# Patient Record
Sex: MALE | Race: WHITE | NOT HISPANIC OR LATINO | Employment: UNEMPLOYED | URBAN - NONMETROPOLITAN AREA
[De-identification: names, ages, dates, MRNs, and addresses within clinical notes are randomized per-mention and may not be internally consistent; named-entity substitution may affect disease eponyms.]

---

## 2021-06-21 ENCOUNTER — OFFICE VISIT (OUTPATIENT)
Dept: MEDICAL GROUP | Facility: PHYSICIAN GROUP | Age: 36
End: 2021-06-21
Payer: COMMERCIAL

## 2021-06-21 VITALS
SYSTOLIC BLOOD PRESSURE: 128 MMHG | WEIGHT: 163 LBS | TEMPERATURE: 98.4 F | RESPIRATION RATE: 14 BRPM | DIASTOLIC BLOOD PRESSURE: 74 MMHG | BODY MASS INDEX: 21.6 KG/M2 | HEART RATE: 70 BPM | HEIGHT: 73 IN | OXYGEN SATURATION: 99 %

## 2021-06-21 DIAGNOSIS — Z13.220 SCREENING, LIPID: ICD-10-CM

## 2021-06-21 DIAGNOSIS — M54.50 LUMBAR BACK PAIN: ICD-10-CM

## 2021-06-21 DIAGNOSIS — K59.1 FUNCTIONAL DIARRHEA: ICD-10-CM

## 2021-06-21 DIAGNOSIS — Z80.8 FAMILY HISTORY OF MELANOMA: ICD-10-CM

## 2021-06-21 DIAGNOSIS — D22.9 NUMEROUS SKIN MOLES: ICD-10-CM

## 2021-06-21 DIAGNOSIS — Z86.19 HISTORY OF HEPATITIS C: ICD-10-CM

## 2021-06-21 DIAGNOSIS — F41.9 ANXIETY: ICD-10-CM

## 2021-06-21 PROBLEM — K59.09 OTHER CONSTIPATION: Status: ACTIVE | Noted: 2021-06-21

## 2021-06-21 PROCEDURE — 99204 OFFICE O/P NEW MOD 45 MIN: CPT | Performed by: INTERNAL MEDICINE

## 2021-06-21 ASSESSMENT — PATIENT HEALTH QUESTIONNAIRE - PHQ9
CLINICAL INTERPRETATION OF PHQ2 SCORE: 2
5. POOR APPETITE OR OVEREATING: 3 - NEARLY EVERY DAY
SUM OF ALL RESPONSES TO PHQ QUESTIONS 1-9: 11

## 2021-06-21 NOTE — PROGRESS NOTES
Chief Complaint   Patient presents with   • Establish Care     Hep C.        HISTORY OF PRESENT ILLNESS: Patient is a 35 y.o. male new patient who presents today to discuss the medical issues below.    Functional diarrhea  Patient reports more problems with stool, skinnier in size generally painful with stooling, difficulty maintaining weight.  States present most of his life.  Anorexia most of the time, rarely good appetite.  He does use cannibus for appetite and chronic pain.      Anxiety  History of anxiety and OCD, has been on meds in the past fluoxitine, currently using cannabis.  Weight is stable but he generally needs to work on it.  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     Numerous skin moles  Mom with melanoma, wants check, no change to the moles he has.      Lumbar back pain  Patient reports chronic low back pain, he reports he has had imaging years ago for back pain in random ER, had scan positive for herniated discs and was told to put up with the pain as long as he could, lives in chronic pain.  History of fall with fractures, he believes has fractures.  Radiates to the right radiates into the legs bilaterally and generally weak.       Patient Active Problem List    Diagnosis Date Noted   • Family history of melanoma 06/21/2021   • History of hepatitis C 06/21/2021   • Functional diarrhea 06/21/2021   • Anxiety 06/21/2021   • Numerous skin moles 06/21/2021   • Lumbar back pain 06/21/2021       Allergies:Patient has no known allergies.    No current outpatient medications on file.     No current facility-administered medications for this visit.         Past Medical History:   Diagnosis Date   • Back pain        Social History     Tobacco Use   • Smoking status: Never Smoker   • Smokeless tobacco: Never Used   Substance Use Topics   • Alcohol use: Yes     Comment: occasional   • Drug use: Yes     Comment:  "Marijuana, occasional       No family status information on file.   No family history on file.    ROS:    Respiratory: Negative for cough, sputum production, shortness of breath or wheezing.    Cardiovascular: Negative for chest pain, palpitations, orthopnea, dyspnea with exertion or edema.   Gastrointestinal: Negative for GI upset, nausea, vomiting, abdominal pain, constipation or diarrhea.   Genitourinary: Negative for dysuria, urgency, hesitancy or frequency.       Exam:    /74   Pulse 70   Temp 36.9 °C (98.4 °F) (Temporal)   Resp 14   Ht 1.854 m (6' 1\")   Wt 73.9 kg (163 lb)   SpO2 99%  Body mass index is 21.51 kg/m².  General:  Well nourished, well developed male in NAD.  HENT: Normocephalic, bilateral TMs are intact, nasal and oral mucosa with no lesions,   Spine: diffuse lumbar discomfort to palpation.   Pulmonary: Clear to ausculation and percussion.  Normal effort. No rales, rhonchi, or wheezing.  Cardiovascular: Regular rate and rhythm without murmur.   Abdomen: Normal bowel sounds soft and nontender no palpable liver spleen bladder mass.  Extremities: No LE edema noted.  Neuro: Grossly nonfocal.  Psych: Alert and oriented to person, place, and time. Appropriate mood and conversation.        This dictation was created using voice recognition software. I have made reasonable attempts to correct errors, however, errors of grammar and content may exist.          Assessment/Plan:    1. Family history of melanoma  Referral for dermatology for surveillance.  - REFERRAL TO DERMATOLOGY    2. History of hepatitis C  Proceed to RNA PCR hep C antibody liver function assessment discussed referral to GI as needed positive findings  - HCV RNA PCR-GENOTYPE REFLEX; Future  - HEP C VIRUS ANTIBODY; Future    3. Functional diarrhea  Most consistent with functional check labs.  Monitor with proceed to #4 below.  - Comp Metabolic Panel; Future  - CBC WITH DIFFERENTIAL; Future    4. Anxiety  Components of OCD.  " Discussed our options.  Patient would like to be seen by psychiatry for full evaluation prior to medications referral is placed.  He is currently not in crisis.  Discussed behavior modification assist  - REFERRAL TO PSYCHIATRY    5. Numerous skin moles  To my simple exam no overt malignancies referral to dermatology    6. Lumbar back pain  Chronic periods consistent with degenerative lumbar spine disease has had work-up in the past but this is unlikely to be able to be obtainable and it has been quite a while.  We will go ahead and start with x-ray and early follow-up.  - DX-LUMBAR SPINE-2 OR 3 VIEWS; Future    7. Screening, lipid  Screening lipid for completeness  - Lipid Profile; Future       Patient was seen for 45 minutes face to face of which more than 50% of the time was spent in counseling and coordination of care regarding the above problems.

## 2021-07-21 ENCOUNTER — APPOINTMENT (RX ONLY)
Dept: URBAN - NONMETROPOLITAN AREA CLINIC 15 | Facility: CLINIC | Age: 36
Setting detail: DERMATOLOGY
End: 2021-07-21

## 2021-07-21 DIAGNOSIS — D18.0 HEMANGIOMA: ICD-10-CM

## 2021-07-21 DIAGNOSIS — D22 MELANOCYTIC NEVI: ICD-10-CM

## 2021-07-21 DIAGNOSIS — Z71.89 OTHER SPECIFIED COUNSELING: ICD-10-CM

## 2021-07-21 DIAGNOSIS — L81.4 OTHER MELANIN HYPERPIGMENTATION: ICD-10-CM

## 2021-07-21 PROBLEM — D22.62 MELANOCYTIC NEVI OF LEFT UPPER LIMB, INCLUDING SHOULDER: Status: ACTIVE | Noted: 2021-07-21

## 2021-07-21 PROBLEM — D22.39 MELANOCYTIC NEVI OF OTHER PARTS OF FACE: Status: ACTIVE | Noted: 2021-07-21

## 2021-07-21 PROBLEM — D48.5 NEOPLASM OF UNCERTAIN BEHAVIOR OF SKIN: Status: ACTIVE | Noted: 2021-07-21

## 2021-07-21 PROBLEM — D22.4 MELANOCYTIC NEVI OF SCALP AND NECK: Status: ACTIVE | Noted: 2021-07-21

## 2021-07-21 PROBLEM — D22.72 MELANOCYTIC NEVI OF LEFT LOWER LIMB, INCLUDING HIP: Status: ACTIVE | Noted: 2021-07-21

## 2021-07-21 PROBLEM — D18.01 HEMANGIOMA OF SKIN AND SUBCUTANEOUS TISSUE: Status: ACTIVE | Noted: 2021-07-21

## 2021-07-21 PROBLEM — D22.71 MELANOCYTIC NEVI OF RIGHT LOWER LIMB, INCLUDING HIP: Status: ACTIVE | Noted: 2021-07-21

## 2021-07-21 PROBLEM — D22.5 MELANOCYTIC NEVI OF TRUNK: Status: ACTIVE | Noted: 2021-07-21

## 2021-07-21 PROBLEM — D22.61 MELANOCYTIC NEVI OF RIGHT UPPER LIMB, INCLUDING SHOULDER: Status: ACTIVE | Noted: 2021-07-21

## 2021-07-21 PROCEDURE — 69100 BIOPSY OF EXTERNAL EAR: CPT

## 2021-07-21 PROCEDURE — ? COUNSELING

## 2021-07-21 PROCEDURE — 99203 OFFICE O/P NEW LOW 30 MIN: CPT | Mod: 25

## 2021-07-21 PROCEDURE — ? BIOPSY BY SHAVE METHOD

## 2021-07-21 PROCEDURE — 11103 TANGNTL BX SKIN EA SEP/ADDL: CPT | Mod: 59

## 2021-07-21 PROCEDURE — 11102 TANGNTL BX SKIN SINGLE LES: CPT | Mod: 59

## 2021-07-21 ASSESSMENT — LOCATION SIMPLE DESCRIPTION DERM
LOCATION SIMPLE: SCALP
LOCATION SIMPLE: RIGHT EAR
LOCATION SIMPLE: RIGHT POSTERIOR THIGH
LOCATION SIMPLE: LEFT THIGH
LOCATION SIMPLE: LEFT UPPER ARM
LOCATION SIMPLE: LEFT SHOULDER
LOCATION SIMPLE: CHEST
LOCATION SIMPLE: RIGHT UPPER ARM
LOCATION SIMPLE: LEFT FOREARM
LOCATION SIMPLE: POSTERIOR SCALP
LOCATION SIMPLE: LEFT POSTERIOR THIGH
LOCATION SIMPLE: RIGHT THIGH
LOCATION SIMPLE: RIGHT CALF
LOCATION SIMPLE: LEFT TEMPLE
LOCATION SIMPLE: LEFT EYEBROW
LOCATION SIMPLE: RIGHT SHOULDER
LOCATION SIMPLE: LEFT CHEEK
LOCATION SIMPLE: RIGHT FOREARM
LOCATION SIMPLE: RIGHT CHEEK
LOCATION SIMPLE: LEFT CALF

## 2021-07-21 ASSESSMENT — LOCATION ZONE DERM
LOCATION ZONE: LEG
LOCATION ZONE: FACE
LOCATION ZONE: EAR
LOCATION ZONE: ARM
LOCATION ZONE: TRUNK
LOCATION ZONE: SCALP

## 2021-07-21 ASSESSMENT — LOCATION DETAILED DESCRIPTION DERM
LOCATION DETAILED: LEFT MEDIAL SUPERIOR CHEST
LOCATION DETAILED: LEFT PROXIMAL DORSAL FOREARM
LOCATION DETAILED: RIGHT MEDIAL INFERIOR CHEST
LOCATION DETAILED: LEFT ANTERIOR DISTAL THIGH
LOCATION DETAILED: RIGHT DISTAL POSTERIOR UPPER ARM
LOCATION DETAILED: LEFT LATERAL EYEBROW
LOCATION DETAILED: RIGHT CENTRAL PARIETAL SCALP
LOCATION DETAILED: LEFT PROXIMAL POSTERIOR UPPER ARM
LOCATION DETAILED: RIGHT VENTRAL PROXIMAL FOREARM
LOCATION DETAILED: LEFT LATERAL TEMPLE
LOCATION DETAILED: RIGHT PROXIMAL DORSAL FOREARM
LOCATION DETAILED: RIGHT ANTERIOR DISTAL THIGH
LOCATION DETAILED: RIGHT PROXIMAL CALF
LOCATION DETAILED: RIGHT INFERIOR POSTAURICULAR SKIN
LOCATION DETAILED: RIGHT POSTERIOR EAR
LOCATION DETAILED: LEFT VENTRAL PROXIMAL FOREARM
LOCATION DETAILED: MID-OCCIPITAL SCALP
LOCATION DETAILED: RIGHT INFERIOR OCCIPITAL SCALP
LOCATION DETAILED: RIGHT LATERAL SUPERIOR CHEST
LOCATION DETAILED: LEFT POSTERIOR SHOULDER
LOCATION DETAILED: RIGHT DISTAL POSTERIOR THIGH
LOCATION DETAILED: LEFT PROXIMAL CALF
LOCATION DETAILED: RIGHT CENTRAL MALAR CHEEK
LOCATION DETAILED: RIGHT POSTERIOR SHOULDER
LOCATION DETAILED: LEFT DISTAL POSTERIOR THIGH
LOCATION DETAILED: LEFT LATERAL MALAR CHEEK

## 2021-07-21 NOTE — PROCEDURE: BIOPSY BY SHAVE METHOD
Detail Level: Detailed
Depth Of Biopsy: dermis
Was A Bandage Applied: Yes
Size Of Lesion In Cm: 0.8
X Size Of Lesion In Cm: 0
Biopsy Type: H and E
Biopsy Method: Personna blade
Anesthesia Type: 1% lidocaine with epinephrine
Anesthesia Volume In Cc: 1
Hemostasis: Electrocautery
Wound Care: Vaseline
Dressing: Band-Aid
Destruction After The Procedure: No
Type Of Destruction Used: Electrodesiccation
Curettage Text: The wound bed was treated with curettage after the biopsy was performed.
Cryotherapy Text: The wound bed was treated with cryotherapy after the biopsy was performed.
Electrodesiccation Text: The wound bed was treated with electrodesiccation after the biopsy was performed.
Electrodesiccation And Curettage Text: The wound bed was treated with electrodesiccation and curettage after the biopsy was performed.
Silver Nitrate Text: The wound bed was treated with silver nitrate after the biopsy was performed.
Lab: [id number]
Lab Facility: 
Consent: Written consent was obtained and risks were reviewed including but not limited to scarring, infection, bleeding, scabbing, incomplete removal, nerve damage and allergy to anesthesia.
Post-Care Instructions: I reviewed with the patient in detail post-care instructions. Patient is to keep the biopsy site dry overnight, and then apply bacitracin/petroleum  twice daily until healed. Patient may apply hydrogen peroxide soaks to remove any crusting.
Notification Instructions: Patient will be notified of biopsy results. However, patient instructed to call the office if not contacted within 2 weeks.
Billing Type: Third-Party Bill
Information: Selecting Yes will display possible errors in your note based on the variables you have selected. This validation is only offered as a suggestion for you. PLEASE NOTE THAT THE VALIDATION TEXT WILL BE REMOVED WHEN YOU FINALIZE YOUR NOTE. IF YOU WANT TO FAX A PRELIMINARY NOTE YOU WILL NEED TO TOGGLE THIS TO 'NO' IF YOU DO NOT WANT IT IN YOUR FAXED NOTE.
Size Of Lesion In Cm: 0.6

## 2021-10-22 ENCOUNTER — OFFICE VISIT (OUTPATIENT)
Dept: MEDICAL GROUP | Facility: PHYSICIAN GROUP | Age: 36
End: 2021-10-22
Payer: COMMERCIAL

## 2021-10-22 VITALS
HEART RATE: 80 BPM | WEIGHT: 164.5 LBS | OXYGEN SATURATION: 98 % | RESPIRATION RATE: 16 BRPM | DIASTOLIC BLOOD PRESSURE: 76 MMHG | HEIGHT: 75 IN | BODY MASS INDEX: 20.45 KG/M2 | SYSTOLIC BLOOD PRESSURE: 108 MMHG | TEMPERATURE: 98.8 F

## 2021-10-22 DIAGNOSIS — R68.84 JAW PAIN: ICD-10-CM

## 2021-10-22 DIAGNOSIS — B96.89 ACUTE BACTERIAL SINUSITIS: ICD-10-CM

## 2021-10-22 DIAGNOSIS — J01.90 ACUTE BACTERIAL SINUSITIS: ICD-10-CM

## 2021-10-22 PROCEDURE — 99213 OFFICE O/P EST LOW 20 MIN: CPT | Performed by: NURSE PRACTITIONER

## 2021-10-22 RX ORDER — AMOXICILLIN AND CLAVULANATE POTASSIUM 875; 125 MG/1; MG/1
1 TABLET, FILM COATED ORAL 2 TIMES DAILY
Qty: 14 TABLET | Refills: 0 | Status: SHIPPED | OUTPATIENT
Start: 2021-10-22 | End: 2021-11-17 | Stop reason: SDUPTHER

## 2021-10-22 NOTE — ASSESSMENT & PLAN NOTE
Patient reports 6 to 7-week onset of right upper and lower jaw pain. Deep pain, not really in mouth.  Worse at night.  Associative right otalgia and right side sinus congestion.  Has tried heat, and muscle, mouthwash.  Last time he had the sensation was about 7 years ago and was treated with antibiotic which resolved the symptoms.  He has full dentures.  Fell off a roof in 2012 and damaged many of his teeth, so he had them all extracted.  Denies fever, malaise, sore throat.

## 2021-10-22 NOTE — PROGRESS NOTES
"CC: Jaw pain    HISTORY OF THE PRESENT ILLNESS: Patient is a 36 y.o. male. This pleasant patient is here today for evaluation and management of the following health problems.    Patient is new to me.  Primary care provider is Dr. Jacobs.        Jaw pain  Patient reports 6 to 7-week onset of right upper and lower jaw pain. Deep pain, not really in mouth.  Worse at night.  Associative right otalgia and right side sinus congestion.  Has tried heat, and muscle, mouthwash.  Last time he had the sensation was about 7 years ago and was treated with antibiotic which resolved the symptoms.  He has full dentures.  Fell off a roof in 2012 and damaged many of his teeth, so he had them all extracted.  Denies fever, malaise, sore throat.      Allergies: Patient has no known allergies.    Current Outpatient Medications Ordered in Epic   Medication Sig Dispense Refill   • amoxicillin-clavulanate (AUGMENTIN) 875-125 MG Tab Take 1 Tablet by mouth 2 times a day. 14 Tablet 0     No current Epic-ordered facility-administered medications on file.       Past Medical History:   Diagnosis Date   • Back pain        No past surgical history on file.    Social History     Tobacco Use   • Smoking status: Never Smoker   • Smokeless tobacco: Never Used   Substance Use Topics   • Alcohol use: Yes     Comment: occasional   • Drug use: Yes     Comment: Marijuana, occasional       No family history on file.    ROS:   As in HPI, otherwise negative for chest pain, dyspnea, abdominal pain, fever          Exam: /76   Pulse 80   Temp 37.1 °C (98.8 °F) (Temporal)   Resp 16   Ht 1.905 m (6' 3\")   Wt 74.6 kg (164 lb 8 oz)   SpO2 98%  Body mass index is 20.56 kg/m².    General: Alert, pleasant, well nourished, well developed male in NAD  HEENT: Normocephalic. Ears normal shape and contour, canals are clear bilaterally, tympanic membranes are pearly gray with good light reflex, nasal mucosa pink and edematous, oropharynx with areas of irritation " near areas of pain and is without erythema, edema or exudates.   Neck: Supple without bruit. Thyroid is not enlarged.  Pulmonary: Clear to ausculation.  Normal effort. No rales, ronchi, or wheezing.  Cardiovascular: Normal rate and rhythm without murmur.  Neurologic: Grossly nonfocal  Lymph: No cervical or supraclavicular lymph nodes are palpable  Skin: Warm and dry.    Psych: Normal mood and affect. Alert and oriented. Judgment and insight is normal.    Please note that this dictation was created using voice recognition software. I have made every reasonable attempt to correct obvious errors, but I expect that there are errors of grammar and possibly content that I did not discover before finalizing the note.      Assessment/Plan  1. Acute bacterial sinusitis  As in #2  - amoxicillin-clavulanate (AUGMENTIN) 875-125 MG Tab; Take 1 Tablet by mouth 2 times a day.  Dispense: 14 Tablet; Refill: 0    2. Jaw pain  We will treat it as a bacterial sinusitis as symptoms have been ongoing for greater than 10 days.  Advised patient that if pain does not resolve or if recurs frequently, should see ENT.  He will let me know if he would like a referral.  Instructions and side effects of antibiotic reviewed with patient.

## 2021-11-17 ENCOUNTER — OFFICE VISIT (OUTPATIENT)
Dept: MEDICAL GROUP | Facility: PHYSICIAN GROUP | Age: 36
End: 2021-11-17

## 2021-11-17 VITALS
HEART RATE: 78 BPM | WEIGHT: 163.9 LBS | BODY MASS INDEX: 20.38 KG/M2 | OXYGEN SATURATION: 96 % | SYSTOLIC BLOOD PRESSURE: 126 MMHG | DIASTOLIC BLOOD PRESSURE: 70 MMHG | HEIGHT: 75 IN | RESPIRATION RATE: 12 BRPM | TEMPERATURE: 97.2 F

## 2021-11-17 DIAGNOSIS — H91.90 CHANGE IN HEARING, UNSPECIFIED LATERALITY: ICD-10-CM

## 2021-11-17 DIAGNOSIS — J34.2 DEVIATED SEPTUM: ICD-10-CM

## 2021-11-17 DIAGNOSIS — F41.9 ANXIETY: ICD-10-CM

## 2021-11-17 DIAGNOSIS — R68.84 JAW PAIN: ICD-10-CM

## 2021-11-17 PROCEDURE — 99213 OFFICE O/P EST LOW 20 MIN: CPT | Performed by: NURSE PRACTITIONER

## 2021-11-17 RX ORDER — AMOXICILLIN AND CLAVULANATE POTASSIUM 875; 125 MG/1; MG/1
1 TABLET, FILM COATED ORAL 2 TIMES DAILY
Qty: 14 TABLET | Refills: 0 | Status: SHIPPED | OUTPATIENT
Start: 2021-11-17 | End: 2022-05-04

## 2021-11-18 NOTE — ASSESSMENT & PLAN NOTE
HPI from 10/22/21: Patient reports 6 to 7-week onset of right upper and lower jaw pain. Deep pain, not really in mouth.  Worse at night.  Associative right otalgia and right side sinus congestion.  Has tried heat, and muscle, mouthwash.  Last time he had the sensation was about 7 years ago and was treated with antibiotic which resolved the symptoms.  He has full dentures.  Fell off a roof in 2012 and damaged many of his teeth, so he had them all extracted.  Denies fever, malaise, sore throat.    Today's HPI: Reports antibiotics helped resolve symptoms, but then they returned a week after discontinuing antibiotics. Had intense pain in his lower right gumline. Has not had associative otalgia or sinus congestion with this pain. Has not had pain for a couple days. He wonders if he has an infection under his gumline.  Reports that specific area is very sensitive to cold air and eating. He cannot wear his lower denture while eating.    Also reports difficulty breathing through his nose since his accident several years ago. Has a deviated septum. Has been using generic brand Afrin periodically with good relief. Has not tried Flonase nasal spray.

## 2021-11-18 NOTE — PROGRESS NOTES
CC: Jaw/mouth pain    HISTORY OF THE PRESENT ILLNESS: Patient is a 36 y.o. male. This pleasant patient is here today for evaluation and management of the following health problems.    Patient's primary care provider is Dr. Jacobs.      Jaw pain  HPI from 10/22/21: Patient reports 6 to 7-week onset of right upper and lower jaw pain. Deep pain, not really in mouth.  Worse at night.  Associative right otalgia and right side sinus congestion.  Has tried heat, and muscle, mouthwash.  Last time he had the sensation was about 7 years ago and was treated with antibiotic which resolved the symptoms.  He has full dentures.  Fell off a roof in 2012 and damaged many of his teeth, so he had them all extracted.  Denies fever, malaise, sore throat.    Today's HPI: Reports antibiotics helped resolve symptoms, but then they returned a week after discontinuing antibiotics. Had intense pain in his lower right gumline. Has not had associative otalgia or sinus congestion with this pain. Has not had pain for a couple days. He wonders if he has an infection under his gumline.  Reports that specific area is very sensitive to cold air and eating. He cannot wear his lower denture while eating.    Also reports difficulty breathing through his nose since his accident several years ago. Has a deviated septum. Has been using generic brand Afrin periodically with good relief. Has not tried Flonase nasal spray.      Anxiety  HPI from 6/21/21 by Dr. Jacobs: History of anxiety and OCD, has been on meds in the past fluoxitine, currently using cannabis.  Weight is stable but he generally needs to work on it.  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     Today's HPI: Continues with same symptoms as in previous HPI. Reports referral went to [facility]. They were not very receptive when he tried to schedule. He would like to schedule with [facility]  "psychiatry.      Allergies: Patient has no known allergies.    Current Outpatient Medications Ordered in Epic   Medication Sig Dispense Refill   • amoxicillin-clavulanate (AUGMENTIN) 875-125 MG Tab Take 1 Tablet by mouth 2 times a day. 14 Tablet 0     No current Epic-ordered facility-administered medications on file.       Past Medical History:   Diagnosis Date   • Back pain        History reviewed. No pertinent surgical history.    Social History     Tobacco Use   • Smoking status: Never Smoker   • Smokeless tobacco: Never Used   Substance Use Topics   • Alcohol use: Yes     Comment: occasional   • Drug use: Yes     Comment: Marijuana, occasional       History reviewed. No pertinent family history.    ROS:  As in HPI, otherwise negative for chest pain, dyspnea, fever           Exam: /70 (BP Location: Left arm, Patient Position: Sitting, BP Cuff Size: Adult)   Pulse 78   Temp 36.2 °C (97.2 °F) (Temporal)   Resp 12   Ht 1.905 m (6' 3\")   Wt 74.3 kg (163 lb 14.4 oz)   SpO2 96%  Body mass index is 20.49 kg/m².    General: Alert, pleasant, well nourished, well developed male in NAD  HEENT: Normocephalic. Eyes conjunctiva clear lids without ptosis, pupils equal and reactive to light, ears normal shape and contour, canals are clear bilaterally, tympanic membranes are pearly gray with good light reflex, nasal mucosa without erythema and drainage, oropharynx is without erythema, edema or exudates.   Neck: Supple without bruit. Thyroid is not enlarged.  Pulmonary: Clear to ausculation.  Normal effort. No rales, ronchi, or wheezing.  Cardiovascular: Normal rate and rhythm without murmur.   Neurologic: Grossly nonfocal  Lymph: No cervical or supraclavicular lymph nodes are palpable  Skin: Warm and dry.    Psych: Normal mood and affect. Alert and oriented. Judgment and insight is normal.    Please note that this dictation was created using voice recognition software. I have made every reasonable attempt to correct " obvious errors, but I expect that there are errors of grammar and possibly content that I did not discover before finalizing the note.      Assessment/Plan  1. Jaw pain  No abnormality seen on exam today. Will prescribe contingent antibiotic if symptoms return or worsen.  Would like patient to consult with ENT for mouth pain, deviated septum, hearing issues. Patient is open to referral.  - Referral to ENT    2. Deviated septum  As in #1  - Referral to ENT    3. Change in hearing, unspecified laterality  Patient reports changes in hearing.  - Referral to ENT    4. Anxiety  Patient would like to establish with psychiatry at [facility].  - Referral to Psychiatry    Patient will return to clinic in 2 to 3 months with Dr. Jacobs for lab review.

## 2021-11-18 NOTE — ASSESSMENT & PLAN NOTE
HPI from 6/21/21 by Dr. Jacobs: History of anxiety and OCD, has been on meds in the past fluoxitine, currently using cannabis.  Weight is stable but he generally needs to work on it.  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     Today's HPI: Continues with same symptoms as in previous HPI. Reports referral went to [facility]. They were not very receptive when he tried to schedule. He would like to schedule with [facility].

## 2021-12-31 ENCOUNTER — HOSPITAL ENCOUNTER (OUTPATIENT)
Dept: LAB | Facility: MEDICAL CENTER | Age: 36
End: 2021-12-31
Attending: INTERNAL MEDICINE

## 2021-12-31 DIAGNOSIS — Z86.19 HISTORY OF HEPATITIS C: ICD-10-CM

## 2021-12-31 DIAGNOSIS — K59.1 FUNCTIONAL DIARRHEA: ICD-10-CM

## 2021-12-31 DIAGNOSIS — Z13.220 SCREENING, LIPID: ICD-10-CM

## 2021-12-31 LAB
ALBUMIN SERPL BCP-MCNC: 4.6 G/DL (ref 3.2–4.9)
ALBUMIN/GLOB SERPL: 1.8 G/DL
ALP SERPL-CCNC: 112 U/L (ref 30–99)
ALT SERPL-CCNC: 39 U/L (ref 2–50)
ANION GAP SERPL CALC-SCNC: 12 MMOL/L (ref 7–16)
AST SERPL-CCNC: 30 U/L (ref 12–45)
BASOPHILS # BLD AUTO: 0.5 % (ref 0–1.8)
BASOPHILS # BLD: 0.05 K/UL (ref 0–0.12)
BILIRUB SERPL-MCNC: 0.6 MG/DL (ref 0.1–1.5)
BUN SERPL-MCNC: 10 MG/DL (ref 8–22)
CALCIUM SERPL-MCNC: 9.5 MG/DL (ref 8.5–10.5)
CHLORIDE SERPL-SCNC: 104 MMOL/L (ref 96–112)
CHOLEST SERPL-MCNC: 225 MG/DL (ref 100–199)
CO2 SERPL-SCNC: 22 MMOL/L (ref 20–33)
CREAT SERPL-MCNC: 0.87 MG/DL (ref 0.5–1.4)
EOSINOPHIL # BLD AUTO: 0.52 K/UL (ref 0–0.51)
EOSINOPHIL NFR BLD: 5.2 % (ref 0–6.9)
ERYTHROCYTE [DISTWIDTH] IN BLOOD BY AUTOMATED COUNT: 41.7 FL (ref 35.9–50)
FASTING STATUS PATIENT QL REPORTED: NORMAL
GLOBULIN SER CALC-MCNC: 2.5 G/DL (ref 1.9–3.5)
GLUCOSE SERPL-MCNC: 96 MG/DL (ref 65–99)
HCT VFR BLD AUTO: 45.1 % (ref 42–52)
HCV AB SER QL: REACTIVE
HDLC SERPL-MCNC: 39 MG/DL
HGB BLD-MCNC: 15.9 G/DL (ref 14–18)
IMM GRANULOCYTES # BLD AUTO: 0.04 K/UL (ref 0–0.11)
IMM GRANULOCYTES NFR BLD AUTO: 0.4 % (ref 0–0.9)
LDLC SERPL CALC-MCNC: 154 MG/DL
LYMPHOCYTES # BLD AUTO: 2.26 K/UL (ref 1–4.8)
LYMPHOCYTES NFR BLD: 22.4 % (ref 22–41)
MCH RBC QN AUTO: 31.1 PG (ref 27–33)
MCHC RBC AUTO-ENTMCNC: 35.3 G/DL (ref 33.7–35.3)
MCV RBC AUTO: 88.3 FL (ref 81.4–97.8)
MONOCYTES # BLD AUTO: 0.8 K/UL (ref 0–0.85)
MONOCYTES NFR BLD AUTO: 7.9 % (ref 0–13.4)
NEUTROPHILS # BLD AUTO: 6.42 K/UL (ref 1.82–7.42)
NEUTROPHILS NFR BLD: 63.6 % (ref 44–72)
NRBC # BLD AUTO: 0 K/UL
NRBC BLD-RTO: 0 /100 WBC
PLATELET # BLD AUTO: 243 K/UL (ref 164–446)
PMV BLD AUTO: 11.4 FL (ref 9–12.9)
POTASSIUM SERPL-SCNC: 4 MMOL/L (ref 3.6–5.5)
PROT SERPL-MCNC: 7.1 G/DL (ref 6–8.2)
RBC # BLD AUTO: 5.11 M/UL (ref 4.7–6.1)
SODIUM SERPL-SCNC: 138 MMOL/L (ref 135–145)
TRIGL SERPL-MCNC: 158 MG/DL (ref 0–149)
WBC # BLD AUTO: 10.1 K/UL (ref 4.8–10.8)

## 2021-12-31 PROCEDURE — 86803 HEPATITIS C AB TEST: CPT

## 2021-12-31 PROCEDURE — 87522 HEPATITIS C REVRS TRNSCRPJ: CPT

## 2021-12-31 PROCEDURE — 36415 COLL VENOUS BLD VENIPUNCTURE: CPT

## 2021-12-31 PROCEDURE — 80061 LIPID PANEL: CPT

## 2021-12-31 PROCEDURE — 80053 COMPREHEN METABOLIC PANEL: CPT

## 2021-12-31 PROCEDURE — 85025 COMPLETE CBC W/AUTO DIFF WBC: CPT

## 2022-01-05 LAB
HCV RNA SERPL NAA+PROBE-ACNC: NOT DETECTED IU/ML
HCV RNA SERPL NAA+PROBE-LOG IU: NOT DETECTED LOG IU/ML
HCV RNA SERPL QL NAA+PROBE: NOT DETECTED

## 2022-01-11 NOTE — TELEPHONE ENCOUNTER
Please contact the patient.   Why does he want a refill on Augmentin? (what are his symptoms?)  Our records indicate he was referred to ENT for jaw pain, did he see the ENT specialist?

## 2022-01-18 ENCOUNTER — OFFICE VISIT (OUTPATIENT)
Dept: MEDICAL GROUP | Facility: PHYSICIAN GROUP | Age: 37
End: 2022-01-18

## 2022-01-18 VITALS
BODY MASS INDEX: 20.51 KG/M2 | TEMPERATURE: 97.9 F | HEIGHT: 75 IN | HEART RATE: 87 BPM | SYSTOLIC BLOOD PRESSURE: 110 MMHG | DIASTOLIC BLOOD PRESSURE: 80 MMHG | WEIGHT: 165 LBS | RESPIRATION RATE: 16 BRPM | OXYGEN SATURATION: 97 %

## 2022-01-18 DIAGNOSIS — Z86.19 HISTORY OF HEPATITIS C: ICD-10-CM

## 2022-01-18 DIAGNOSIS — F41.9 ANXIETY: ICD-10-CM

## 2022-01-18 DIAGNOSIS — M54.50 LUMBAR BACK PAIN: ICD-10-CM

## 2022-01-18 DIAGNOSIS — L02.419 AXILLARY ABSCESS: ICD-10-CM

## 2022-01-18 PROCEDURE — 99214 OFFICE O/P EST MOD 30 MIN: CPT | Performed by: INTERNAL MEDICINE

## 2022-01-18 RX ORDER — VENLAFAXINE HYDROCHLORIDE 75 MG/1
75 CAPSULE, EXTENDED RELEASE ORAL DAILY
Qty: 30 CAPSULE | Refills: 3 | Status: SHIPPED | OUTPATIENT
Start: 2022-01-18 | End: 2022-05-04 | Stop reason: SDUPTHER

## 2022-01-18 ASSESSMENT — PATIENT HEALTH QUESTIONNAIRE - PHQ9: CLINICAL INTERPRETATION OF PHQ2 SCORE: 0

## 2022-01-18 ASSESSMENT — FIBROSIS 4 INDEX: FIB4 SCORE: 0.69

## 2022-01-18 NOTE — PROGRESS NOTES
Chief Complaint   Patient presents with   • Follow-Up     BUMP IN ARMPIT- LARGE AND PAINFUL       HISTORY OF PRESENT ILLNESS: Patient is a 36 y.o. male established patient who presents today to discuss the medical issues below.    Anxiety  Patient reports he is super anxious, feels overwhelmed with his challenges at work, girlfriend's pregnancy, cost of health care.  No suicidal ideation.  Feels he has ADHD and wants to see psychiatry.      History of hepatitis C  Patient wants to pursue treatment of the Hepatitis c.     Lumbar back pain  Didn't have x rays done, continues to worry about back pain, no LE weakness.       Patient Active Problem List    Diagnosis Date Noted   • Jaw pain 10/22/2021   • Family history of melanoma 06/21/2021   • History of hepatitis C 06/21/2021   • Functional diarrhea 06/21/2021   • Anxiety 06/21/2021   • Numerous skin moles 06/21/2021   • Lumbar back pain 06/21/2021       Allergies:Patient has no known allergies.    Current Outpatient Medications   Medication Sig Dispense Refill   • venlafaxine XR (EFFEXOR XR) 75 MG CAPSULE SR 24 HR Take 1 Capsule by mouth every day. 30 Capsule 3   • amoxicillin-clavulanate (AUGMENTIN) 875-125 MG Tab Take 1 Tablet by mouth 2 times a day. (Patient not taking: Reported on 1/18/2022) 14 Tablet 0     No current facility-administered medications for this visit.         Past Medical History:   Diagnosis Date   • Back pain        Social History     Tobacco Use   • Smoking status: Never Smoker   • Smokeless tobacco: Never Used   Substance Use Topics   • Alcohol use: Yes     Comment: occasional   • Drug use: Yes     Comment: Marijuana, occasional       No family status information on file.   History reviewed. No pertinent family history.    ROS:    Respiratory: Negative for cough, sputum production, shortness of breath or wheezing.    Cardiovascular: Negative for chest pain, palpitations, orthopnea, dyspnea with exertion or edema.   Gastrointestinal: Negative  "for GI upset, nausea, vomiting, abdominal pain, constipation or diarrhea.   Genitourinary: Negative for dysuria, urgency, hesitancy or frequency.       Exam:    /80   Pulse 87   Temp 36.6 °C (97.9 °F) (Temporal)   Resp 16   Ht 1.905 m (6' 3\")   Wt 74.8 kg (165 lb)   SpO2 97%  Body mass index is 20.62 kg/m².  General:  Well nourished, well developed male in NAD.  Neck: Supple without bruit. Thyroid is not enlarged.  Left Axilla with 2 cm diameter erythematous, raised tender abcess, central point with minimal drainage.   Pulmonary: Clear to ausculation and percussion.  Normal effort. No rales, rhonchi, or wheezing.  Cardiovascular: Regular rate and rhythm without murmur.   Abdomen: Normal bowel sounds soft and nontender no palpable liver spleen bladder mass.  Extremities: No LE edema noted.  Neuro: Grossly nonfocal.  Psych: Alert and oriented to person, place, and time. Appropriate mood and conversation.    LABS: Results reviewed and discussed with the patient, questions answered.    This dictation was created using voice recognition software. I have made reasonable attempts to correct errors, however, errors of grammar and content may exist.          Assessment/Plan:    1. History of hepatitis C  AB positive, check viral load, GI consult.   - Referral to Gastroenterology  - HCV RNA PCR-GENOTYPE REFLEX; Future  - Comp Metabolic Panel; Future  - CBC WITH DIFFERENTIAL; Future    2. Axillary abscess  Risk benefit explained, patient gave verbal consent, cleansed with betadine swabs x 3, topical anesthetic spray, I & D #11 blade single incision, productive thick pus, irrigated with sterile saline.  Dry dressing placed. Discussed warm moist compresses TID, follow up lack of resolution.     3. Anxiety  Substantial component today.  More c/w anxiety disorder to me than ADHD, discussed options, referral to psych, start venlafaxine, early follow up  - Referral to Psychiatry    4. Lumbar back pain  Defer address " until above stabilized.     Patient was seen for 25 minutes face to face of which more than 50% of the time was spent in counseling and coordination of care regarding the above problems.

## 2022-01-18 NOTE — ASSESSMENT & PLAN NOTE
Patient reports he is super anxious, feels overwhelmed with his challenges at work, girlfriend's pregnancy, cost of health care.  No suicidal ideation.  Feels he has ADHD and wants to see psychiatry.

## 2022-01-20 RX ORDER — AMOXICILLIN AND CLAVULANATE POTASSIUM 875; 125 MG/1; MG/1
1 TABLET, FILM COATED ORAL 2 TIMES DAILY
Qty: 14 TABLET | Refills: 0 | OUTPATIENT
Start: 2022-01-20

## 2022-05-04 ENCOUNTER — OFFICE VISIT (OUTPATIENT)
Dept: MEDICAL GROUP | Facility: PHYSICIAN GROUP | Age: 37
End: 2022-05-04
Payer: COMMERCIAL

## 2022-05-04 VITALS
WEIGHT: 167.6 LBS | OXYGEN SATURATION: 96 % | BODY MASS INDEX: 20.84 KG/M2 | RESPIRATION RATE: 16 BRPM | DIASTOLIC BLOOD PRESSURE: 78 MMHG | TEMPERATURE: 97.1 F | SYSTOLIC BLOOD PRESSURE: 120 MMHG | HEIGHT: 75 IN | HEART RATE: 80 BPM

## 2022-05-04 DIAGNOSIS — R68.84 JAW PAIN: ICD-10-CM

## 2022-05-04 DIAGNOSIS — Z86.19 HISTORY OF HEPATITIS C: ICD-10-CM

## 2022-05-04 DIAGNOSIS — F41.9 ANXIETY: ICD-10-CM

## 2022-05-04 DIAGNOSIS — J34.2 DEVIATED SEPTUM: ICD-10-CM

## 2022-05-04 PROCEDURE — 99213 OFFICE O/P EST LOW 20 MIN: CPT | Performed by: NURSE PRACTITIONER

## 2022-05-04 RX ORDER — VENLAFAXINE HYDROCHLORIDE 75 MG/1
75 CAPSULE, EXTENDED RELEASE ORAL DAILY
Qty: 90 CAPSULE | Refills: 3 | Status: SHIPPED | OUTPATIENT
Start: 2022-05-04

## 2022-05-04 ASSESSMENT — FIBROSIS 4 INDEX: FIB4 SCORE: 0.69

## 2022-05-04 NOTE — PROGRESS NOTES
CC: Jaw pain, medication refill    HISTORY OF THE PRESENT ILLNESS: Patient is a 36 y.o. male. This pleasant patient is here today for evaluation and management of the following health problems.        Jaw pain  HPI from 10/22/21: Patient reports 6 to 7-week onset of right upper and lower jaw pain. Deep pain, not really in mouth.  Worse at night.  Associative right otalgia and right side sinus congestion.  Has tried heat, and muscle, mouthwash.  Last time he had the sensation was about 7 years ago and was treated with antibiotic which resolved the symptoms.  He has full dentures.  Fell off a roof in 2012 and damaged many of his teeth, so he had them all extracted.  Denies fever, malaise, sore throat.     HPI from 11/17/21: Reports antibiotics helped resolve symptoms, but then they returned a week after discontinuing antibiotics. Had intense pain in his lower right gumline. Has not had associative otalgia or sinus congestion with this pain. Has not had pain for a couple days. He wonders if he has an infection under his gumline.  Reports that specific area is very sensitive to cold air and eating. He cannot wear his lower denture while eating.     Also reports difficulty breathing through his nose since his accident several years ago. Has a deviated septum. Has been using generic brand Afrin periodically with good relief. Has not tried Flonase nasal spray.    Today's HPI: Reports pain in right lower gumline waxes and wanes.  When he made his appointment with me, was having pain.  It is now resolved.  Seems to last at least couple days at a time.  Associative sinus congestion and right ear pain.  Symptoms usually worse at night when lying flat.  Currently not having symptoms.  Was unable to establish with ENT in [place] due to some insurance issues.  Now has insurance and works in [place].  Asking for referral to ENT in [place].            Anxiety  Chronic health problem, improved management after starting venlafaxine 5 months  "ago.  Taking venlafaxine XR 75 mg daily.  He feels much less anxious and able to handle his stressors.  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  Has a baby on the way.  Others around him have noticed an improvement in his mood also.  Did not establish with psychiatry due to time constraints.  Denies SI/HI.    History of hepatitis C  Consulted with gastroenterology.  Note reviewed.  Negative hep C RNA.  Per gastroenterology, patient had hep C at one time in his life and his body was able to clear it.  He does not have an acute infection so cannot treat.  He is not immune to hep C however.  Denies abdominal pain, nausea.      Allergies: Patient has no known allergies.    Current Outpatient Medications Ordered in Epic   Medication Sig Dispense Refill   • venlafaxine XR (EFFEXOR XR) 75 MG CAPSULE SR 24 HR Take 1 Capsule by mouth every day. 90 Capsule 3     No current [facility]-ordered facility-administered medications on file.       Past Medical History:   Diagnosis Date   • Back pain        History reviewed. No pertinent surgical history.    Social History     Tobacco Use   • Smoking status: Never Smoker   • Smokeless tobacco: Never Used   Substance Use Topics   • Alcohol use: Yes     Comment: occasional   • Drug use: Yes     Comment: Marijuana, occasional       History reviewed. No pertinent family history.    ROS:  As in HPI, otherwise negative for chest pain, dyspnea, abdominal pain, dysuria, blood in stool, fever           Exam: /78 (BP Location: Left arm, Patient Position: Sitting, BP Cuff Size: Adult)   Pulse 80   Temp 36.2 °C (97.1 °F) (Temporal)   Resp 16   Ht 1.905 m (6' 3\")   Wt 76 kg (167 lb 9.6 oz)   SpO2 96%  Body mass index is 20.95 kg/m².    General: Alert, pleasant, well nourished, well developed male in NAD  HEENT: Normocephalic. Eyes conjunctiva clear lids without ptosis, pupils equal and reactive to light, ears normal shape and contour, canals are clear bilaterally, tympanic membranes are " pearly gray with good light reflex, nasal mucosa without erythema and drainage, oropharynx is without erythema, edema or exudates.  Right lower gumline with 2 mm white firm spot, no surrounding erythema, nontender.  Neck: Supple without bruit. Thyroid is not enlarged.  Pulmonary: Clear to ausculation.  Normal effort. No rales, ronchi, or wheezing.  Cardiovascular: Normal rate and rhythm without murmur. Carotid and radial pulses are intact and equal bilaterally.  No lower extremity edema.  Neurologic: Grossly nonfocal  Lymph: No cervical or supraclavicular lymph nodes are palpable  Skin: Warm and dry.    Musculoskeletal: Normal gait.   Psych: Normal mood and affect. Alert and oriented. Judgment and insight is normal.    Please note that this dictation was created using voice recognition software. I have made every reasonable attempt to correct obvious errors, but I expect that there are errors of grammar and possibly content that I did not discover before finalizing the note.      Assessment/Plan  1. Jaw pain  Patient having pain that waxes and wanes.  Has chronic ulcer in lower right gumline.  Will refer to ENT for further evaluation.  - Referral to ENT    2. Anxiety  Improved control.  Continue with venlafaxine, no changes.  Advised on routine aerobic exercise as tolerated.  - venlafaxine XR (EFFEXOR XR) 75 MG CAPSULE SR 24 HR; Take 1 Capsule by mouth every day.  Dispense: 90 Capsule; Refill: 3    3. Deviated septum  As in #1    4. History of hepatitis C  No acute infection.  Advised on prevention of recurrence.  Advised to look at CDC website for further information.

## 2022-05-04 NOTE — ASSESSMENT & PLAN NOTE
Chronic health problem, improved management after starting venlafaxine 5 months ago.  Taking venlafaxine XR 75 mg daily.  He feels much less anxious and able to handle his stressors.  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  Has a baby on the way.  Others around him have noticed an improvement in his mood also.  Did not establish with psychiatry due to time constraints.  Denies SI/HI.
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
## 2022-05-04 NOTE — ASSESSMENT & PLAN NOTE
Consulted with gastroenterology.  Note reviewed.  Negative hep C RNA.  Per gastroenterology, patient had hep C at one time in his life and his body was able to clear it.  He does not have an acute infection so cannot treat.  He is not immune to hep C however.  Denies abdominal pain, nausea.

## 2022-05-26 ENCOUNTER — HOSPITAL ENCOUNTER (INPATIENT)
Facility: MEDICAL CENTER | Age: 37
LOS: 11 days | DRG: 957 | End: 2022-06-07
Attending: EMERGENCY MEDICINE | Admitting: SURGERY
Payer: COMMERCIAL

## 2022-05-26 ENCOUNTER — APPOINTMENT (OUTPATIENT)
Dept: RADIOLOGY | Facility: MEDICAL CENTER | Age: 37
DRG: 957 | End: 2022-05-26
Attending: SURGERY
Payer: COMMERCIAL

## 2022-05-26 ENCOUNTER — HOSPITAL ENCOUNTER (OUTPATIENT)
Dept: RADIOLOGY | Facility: MEDICAL CENTER | Age: 37
End: 2022-05-26

## 2022-05-26 DIAGNOSIS — S32.592A CLOSED BILATERAL FRACTURE OF PUBIC RAMI, INITIAL ENCOUNTER (HCC): ICD-10-CM

## 2022-05-26 DIAGNOSIS — I95.9 HYPOTENSION, UNSPECIFIED HYPOTENSION TYPE: ICD-10-CM

## 2022-05-26 DIAGNOSIS — S37.032A LACERATION OF LEFT KIDNEY, INITIAL ENCOUNTER: ICD-10-CM

## 2022-05-26 DIAGNOSIS — S32.009A CLOSED FRACTURE OF TRANSVERSE PROCESS OF LUMBAR VERTEBRA, INITIAL ENCOUNTER (HCC): ICD-10-CM

## 2022-05-26 DIAGNOSIS — S22.42XA CLOSED FRACTURE OF MULTIPLE RIBS OF LEFT SIDE, INITIAL ENCOUNTER: ICD-10-CM

## 2022-05-26 DIAGNOSIS — S22.43XA CLOSED FRACTURE OF MULTIPLE RIBS OF BOTH SIDES, INITIAL ENCOUNTER: ICD-10-CM

## 2022-05-26 DIAGNOSIS — S27.322A CONTUSION OF BOTH LUNGS, INITIAL ENCOUNTER: ICD-10-CM

## 2022-05-26 DIAGNOSIS — S36.039A LACERATION OF SPLEEN, INITIAL ENCOUNTER: ICD-10-CM

## 2022-05-26 DIAGNOSIS — S82.892A ANKLE FRACTURE, LEFT, CLOSED, INITIAL ENCOUNTER: ICD-10-CM

## 2022-05-26 DIAGNOSIS — S32.591A CLOSED BILATERAL FRACTURE OF PUBIC RAMI, INITIAL ENCOUNTER (HCC): ICD-10-CM

## 2022-05-26 DIAGNOSIS — T14.90XA TRAUMA: ICD-10-CM

## 2022-05-26 DIAGNOSIS — S27.0XXA TRAUMATIC PNEUMOTHORAX, INITIAL ENCOUNTER: ICD-10-CM

## 2022-05-26 PROBLEM — D72.829 LEUKOCYTOSIS: Status: ACTIVE | Noted: 2022-05-26

## 2022-05-26 PROBLEM — S27.321A LEFT PULMONARY CONTUSION: Status: ACTIVE | Noted: 2022-05-26

## 2022-05-26 PROBLEM — Z53.09 CONTRAINDICATION TO DEEP VEIN THROMBOSIS (DVT) PROPHYLAXIS: Status: ACTIVE | Noted: 2022-05-26

## 2022-05-26 PROBLEM — J93.9 PNEUMOTHORAX ON LEFT: Status: ACTIVE | Noted: 2022-05-26

## 2022-05-26 PROBLEM — F41.9 ANXIETY: Chronic | Status: ACTIVE | Noted: 2021-06-21

## 2022-05-26 PROBLEM — T79.4XXA TRAUMATIC HEMORRHAGIC SHOCK (HCC): Status: ACTIVE | Noted: 2022-05-26

## 2022-05-26 PROBLEM — S36.113A LIVER LACERATION, INITIAL ENCOUNTER: Status: ACTIVE | Noted: 2022-05-26

## 2022-05-26 PROBLEM — Z11.52 ENCOUNTER FOR SCREENING FOR COVID-19: Status: ACTIVE | Noted: 2022-05-26

## 2022-05-26 LAB
ABO GROUP BLD: ABNORMAL
ALBUMIN SERPL BCP-MCNC: 3.9 G/DL (ref 3.2–4.9)
ALBUMIN/GLOB SERPL: 1.8 G/DL
ALP SERPL-CCNC: 105 U/L (ref 30–99)
ALT SERPL-CCNC: 229 U/L (ref 2–50)
ANION GAP SERPL CALC-SCNC: 18 MMOL/L (ref 7–16)
APTT PPP: 26.4 SEC (ref 24.7–36)
AST SERPL-CCNC: 315 U/L (ref 12–45)
BILIRUB SERPL-MCNC: 0.3 MG/DL (ref 0.1–1.5)
BLD GP AB SCN SERPL QL: ABNORMAL
BUN SERPL-MCNC: 15 MG/DL (ref 8–22)
CALCIUM SERPL-MCNC: 8.4 MG/DL (ref 8.5–10.5)
CFT BLD TEG: 3.2 MIN (ref 4.6–9.1)
CFT P HPASE BLD TEG: 3.3 MIN (ref 4.3–8.3)
CHLORIDE SERPL-SCNC: 105 MMOL/L (ref 96–112)
CLOT ANGLE BLD TEG: 69.6 DEGREES (ref 63–78)
CLOT LYSIS 30M P MA LENFR BLD TEG: 0.2 % (ref 0–2.6)
CO2 SERPL-SCNC: 18 MMOL/L (ref 20–33)
CREAT SERPL-MCNC: 1.07 MG/DL (ref 0.5–1.4)
CT.EXTRINSIC BLD ROTEM: 1.8 MIN (ref 0.8–2.1)
ERYTHROCYTE [DISTWIDTH] IN BLOOD BY AUTOMATED COUNT: 47 FL (ref 35.9–50)
ETHANOL BLD-MCNC: 12.2 MG/DL
GFR SERPLBLD CREATININE-BSD FMLA CKD-EPI: 92 ML/MIN/1.73 M 2
GLOBULIN SER CALC-MCNC: 2.2 G/DL (ref 1.9–3.5)
GLUCOSE SERPL-MCNC: 184 MG/DL (ref 65–99)
HCT VFR BLD AUTO: 43.3 % (ref 42–52)
HGB BLD-MCNC: 14.9 G/DL (ref 14–18)
INR PPP: 1.28 (ref 0.87–1.13)
LACTATE BLD-SCNC: 3.7 MMOL/L (ref 0.5–2)
MCF BLD TEG: 49.6 MM (ref 52–69)
MCF.PLATELET INHIB BLD ROTEM: 16.2 MM (ref 15–32)
MCH RBC QN AUTO: 31.6 PG (ref 27–33)
MCHC RBC AUTO-ENTMCNC: 34.4 G/DL (ref 33.7–35.3)
MCV RBC AUTO: 91.9 FL (ref 81.4–97.8)
PA AA BLD-ACNC: 64.4 % (ref 0–11)
PA ADP BLD-ACNC: ABNORMAL % (ref 0–17)
PLATELET # BLD AUTO: 197 K/UL (ref 164–446)
PMV BLD AUTO: 9.9 FL (ref 9–12.9)
POTASSIUM SERPL-SCNC: 3.1 MMOL/L (ref 3.6–5.5)
PROT SERPL-MCNC: 6.1 G/DL (ref 6–8.2)
PROTHROMBIN TIME: 15.6 SEC (ref 12–14.6)
RBC # BLD AUTO: 4.71 M/UL (ref 4.7–6.1)
RH BLD: ABNORMAL
SODIUM SERPL-SCNC: 141 MMOL/L (ref 135–145)
TEG ALGORITHM TGALG: ABNORMAL
WBC # BLD AUTO: 39.6 K/UL (ref 4.8–10.8)

## 2022-05-26 PROCEDURE — 700102 HCHG RX REV CODE 250 W/ 637 OVERRIDE(OP): Performed by: SPECIALIST

## 2022-05-26 PROCEDURE — C9803 HOPD COVID-19 SPEC COLLECT: HCPCS | Performed by: SPECIALIST

## 2022-05-26 PROCEDURE — 700105 HCHG RX REV CODE 258: Performed by: SURGERY

## 2022-05-26 PROCEDURE — 96375 TX/PRO/DX INJ NEW DRUG ADDON: CPT

## 2022-05-26 PROCEDURE — 85610 PROTHROMBIN TIME: CPT

## 2022-05-26 PROCEDURE — 82962 GLUCOSE BLOOD TEST: CPT

## 2022-05-26 PROCEDURE — 83605 ASSAY OF LACTIC ACID: CPT

## 2022-05-26 PROCEDURE — 700105 HCHG RX REV CODE 258: Performed by: SPECIALIST

## 2022-05-26 PROCEDURE — 85384 FIBRINOGEN ACTIVITY: CPT

## 2022-05-26 PROCEDURE — 72170 X-RAY EXAM OF PELVIS: CPT

## 2022-05-26 PROCEDURE — 99291 CRITICAL CARE FIRST HOUR: CPT

## 2022-05-26 PROCEDURE — A9270 NON-COVERED ITEM OR SERVICE: HCPCS | Performed by: SPECIALIST

## 2022-05-26 PROCEDURE — 80307 DRUG TEST PRSMV CHEM ANLYZR: CPT

## 2022-05-26 PROCEDURE — 71260 CT THORAX DX C+: CPT

## 2022-05-26 PROCEDURE — 700117 HCHG RX CONTRAST REV CODE 255: Performed by: EMERGENCY MEDICINE

## 2022-05-26 PROCEDURE — 85027 COMPLETE CBC AUTOMATED: CPT

## 2022-05-26 PROCEDURE — 3E0234Z INTRODUCTION OF SERUM, TOXOID AND VACCINE INTO MUSCLE, PERCUTANEOUS APPROACH: ICD-10-PCS | Performed by: EMERGENCY MEDICINE

## 2022-05-26 PROCEDURE — 90715 TDAP VACCINE 7 YRS/> IM: CPT | Performed by: EMERGENCY MEDICINE

## 2022-05-26 PROCEDURE — 0241U HCHG SARS-COV-2 COVID-19 NFCT DS RESP RNA 4 TRGT MIC: CPT

## 2022-05-26 PROCEDURE — 76705 ECHO EXAM OF ABDOMEN: CPT

## 2022-05-26 PROCEDURE — 85576 BLOOD PLATELET AGGREGATION: CPT

## 2022-05-26 PROCEDURE — 71045 X-RAY EXAM CHEST 1 VIEW: CPT

## 2022-05-26 PROCEDURE — 700111 HCHG RX REV CODE 636 W/ 250 OVERRIDE (IP): Performed by: SPECIALIST

## 2022-05-26 PROCEDURE — 700111 HCHG RX REV CODE 636 W/ 250 OVERRIDE (IP)

## 2022-05-26 PROCEDURE — 86900 BLOOD TYPING SEROLOGIC ABO: CPT

## 2022-05-26 PROCEDURE — 86850 RBC ANTIBODY SCREEN: CPT

## 2022-05-26 PROCEDURE — 302830 HCHG BUCKS UNIVERSAL TRACTION BOOT

## 2022-05-26 PROCEDURE — 85347 COAGULATION TIME ACTIVATED: CPT

## 2022-05-26 PROCEDURE — 90471 IMMUNIZATION ADMIN: CPT

## 2022-05-26 PROCEDURE — 82077 ASSAY SPEC XCP UR&BREATH IA: CPT

## 2022-05-26 PROCEDURE — 72131 CT LUMBAR SPINE W/O DYE: CPT

## 2022-05-26 PROCEDURE — 72128 CT CHEST SPINE W/O DYE: CPT

## 2022-05-26 PROCEDURE — 80053 COMPREHEN METABOLIC PANEL: CPT

## 2022-05-26 PROCEDURE — 96374 THER/PROPH/DIAG INJ IV PUSH: CPT

## 2022-05-26 PROCEDURE — 85730 THROMBOPLASTIN TIME PARTIAL: CPT

## 2022-05-26 PROCEDURE — G0378 HOSPITAL OBSERVATION PER HR: HCPCS

## 2022-05-26 PROCEDURE — 700111 HCHG RX REV CODE 636 W/ 250 OVERRIDE (IP): Performed by: EMERGENCY MEDICINE

## 2022-05-26 PROCEDURE — 86901 BLOOD TYPING SEROLOGIC RH(D): CPT

## 2022-05-26 PROCEDURE — 99223 1ST HOSP IP/OBS HIGH 75: CPT | Performed by: SURGERY

## 2022-05-26 PROCEDURE — 700111 HCHG RX REV CODE 636 W/ 250 OVERRIDE (IP): Performed by: SURGERY

## 2022-05-26 PROCEDURE — 700101 HCHG RX REV CODE 250: Performed by: SPECIALIST

## 2022-05-26 PROCEDURE — G0390 TRAUMA RESPONS W/HOSP CRITI: HCPCS

## 2022-05-26 PROCEDURE — 73060 X-RAY EXAM OF HUMERUS: CPT | Mod: LT

## 2022-05-26 RX ORDER — SODIUM CHLORIDE, SODIUM LACTATE, POTASSIUM CHLORIDE, AND CALCIUM CHLORIDE .6; .31; .03; .02 G/100ML; G/100ML; G/100ML; G/100ML
1000 INJECTION, SOLUTION INTRAVENOUS ONCE
Status: COMPLETED | OUTPATIENT
Start: 2022-05-26 | End: 2022-05-26

## 2022-05-26 RX ORDER — POLYETHYLENE GLYCOL 3350 17 G/17G
1 POWDER, FOR SOLUTION ORAL 2 TIMES DAILY
Status: DISCONTINUED | OUTPATIENT
Start: 2022-05-26 | End: 2022-06-07 | Stop reason: HOSPADM

## 2022-05-26 RX ORDER — AMOXICILLIN 250 MG
1 CAPSULE ORAL
Status: DISCONTINUED | OUTPATIENT
Start: 2022-05-26 | End: 2022-06-07 | Stop reason: HOSPADM

## 2022-05-26 RX ORDER — ACETAMINOPHEN 325 MG/1
650 TABLET ORAL EVERY 4 HOURS PRN
Status: DISCONTINUED | OUTPATIENT
Start: 2022-05-26 | End: 2022-05-28

## 2022-05-26 RX ORDER — METAXALONE 400 MG/1
400 TABLET ORAL 3 TIMES DAILY
Status: DISCONTINUED | OUTPATIENT
Start: 2022-05-27 | End: 2022-05-26

## 2022-05-26 RX ORDER — HYDROMORPHONE HYDROCHLORIDE 1 MG/ML
0.5 INJECTION, SOLUTION INTRAMUSCULAR; INTRAVENOUS; SUBCUTANEOUS
Status: DISCONTINUED | OUTPATIENT
Start: 2022-05-26 | End: 2022-05-26

## 2022-05-26 RX ORDER — HYDROMORPHONE HYDROCHLORIDE 1 MG/ML
0.5 INJECTION, SOLUTION INTRAMUSCULAR; INTRAVENOUS; SUBCUTANEOUS ONCE
Status: COMPLETED | OUTPATIENT
Start: 2022-05-26 | End: 2022-05-26

## 2022-05-26 RX ORDER — AMOXICILLIN 250 MG
1 CAPSULE ORAL NIGHTLY
Status: DISCONTINUED | OUTPATIENT
Start: 2022-05-26 | End: 2022-06-07 | Stop reason: HOSPADM

## 2022-05-26 RX ORDER — LIDOCAINE 50 MG/G
1 PATCH TOPICAL EVERY 24 HOURS
Status: DISCONTINUED | OUTPATIENT
Start: 2022-05-26 | End: 2022-06-01

## 2022-05-26 RX ORDER — DEXTROSE MONOHYDRATE 25 G/50ML
25 INJECTION, SOLUTION INTRAVENOUS
Status: DISCONTINUED | OUTPATIENT
Start: 2022-05-26 | End: 2022-05-29

## 2022-05-26 RX ORDER — OXYCODONE HYDROCHLORIDE 10 MG/1
10 TABLET ORAL
Status: DISCONTINUED | OUTPATIENT
Start: 2022-05-26 | End: 2022-05-29

## 2022-05-26 RX ORDER — VENLAFAXINE HYDROCHLORIDE 75 MG/1
75 CAPSULE, EXTENDED RELEASE ORAL DAILY
Status: DISCONTINUED | OUTPATIENT
Start: 2022-05-27 | End: 2022-06-07 | Stop reason: HOSPADM

## 2022-05-26 RX ORDER — OXYCODONE HYDROCHLORIDE 5 MG/1
5 TABLET ORAL
Status: DISCONTINUED | OUTPATIENT
Start: 2022-05-26 | End: 2022-05-29

## 2022-05-26 RX ORDER — ONDANSETRON 4 MG/1
4 TABLET, ORALLY DISINTEGRATING ORAL EVERY 4 HOURS PRN
Status: DISCONTINUED | OUTPATIENT
Start: 2022-05-26 | End: 2022-06-07 | Stop reason: HOSPADM

## 2022-05-26 RX ORDER — BISACODYL 10 MG
10 SUPPOSITORY, RECTAL RECTAL
Status: DISCONTINUED | OUTPATIENT
Start: 2022-05-26 | End: 2022-06-07 | Stop reason: HOSPADM

## 2022-05-26 RX ORDER — ACETAMINOPHEN 325 MG/1
650 TABLET ORAL EVERY 6 HOURS PRN
Status: DISCONTINUED | OUTPATIENT
Start: 2022-06-01 | End: 2022-06-07 | Stop reason: HOSPADM

## 2022-05-26 RX ORDER — ACETAMINOPHEN 325 MG/1
650 TABLET ORAL EVERY 6 HOURS
Status: COMPLETED | OUTPATIENT
Start: 2022-05-27 | End: 2022-05-31

## 2022-05-26 RX ORDER — ENEMA 19; 7 G/133ML; G/133ML
1 ENEMA RECTAL
Status: DISCONTINUED | OUTPATIENT
Start: 2022-05-26 | End: 2022-06-07 | Stop reason: HOSPADM

## 2022-05-26 RX ORDER — METAXALONE 800 MG/1
800 TABLET ORAL 3 TIMES DAILY
Status: DISCONTINUED | OUTPATIENT
Start: 2022-05-26 | End: 2022-06-03

## 2022-05-26 RX ORDER — ONDANSETRON 2 MG/ML
4 INJECTION INTRAMUSCULAR; INTRAVENOUS EVERY 4 HOURS PRN
Status: DISCONTINUED | OUTPATIENT
Start: 2022-05-26 | End: 2022-05-29

## 2022-05-26 RX ORDER — SODIUM CHLORIDE, SODIUM LACTATE, POTASSIUM CHLORIDE, CALCIUM CHLORIDE 600; 310; 30; 20 MG/100ML; MG/100ML; MG/100ML; MG/100ML
INJECTION, SOLUTION INTRAVENOUS CONTINUOUS
Status: DISCONTINUED | OUTPATIENT
Start: 2022-05-26 | End: 2022-06-03

## 2022-05-26 RX ORDER — LIDOCAINE 50 MG/G
1 PATCH TOPICAL EVERY 24 HOURS
Status: DISCONTINUED | OUTPATIENT
Start: 2022-05-27 | End: 2022-05-26

## 2022-05-26 RX ORDER — ACETAMINOPHEN 650 MG/1
650 SUPPOSITORY RECTAL EVERY 4 HOURS PRN
Status: DISCONTINUED | OUTPATIENT
Start: 2022-05-26 | End: 2022-05-28

## 2022-05-26 RX ORDER — HYDROMORPHONE HYDROCHLORIDE 1 MG/ML
INJECTION, SOLUTION INTRAMUSCULAR; INTRAVENOUS; SUBCUTANEOUS
Status: COMPLETED
Start: 2022-05-26 | End: 2022-05-26

## 2022-05-26 RX ORDER — DOCUSATE SODIUM 100 MG/1
100 CAPSULE, LIQUID FILLED ORAL 2 TIMES DAILY
Status: DISCONTINUED | OUTPATIENT
Start: 2022-05-26 | End: 2022-06-07 | Stop reason: HOSPADM

## 2022-05-26 RX ORDER — GABAPENTIN 100 MG/1
100 CAPSULE ORAL EVERY 8 HOURS
Status: DISCONTINUED | OUTPATIENT
Start: 2022-05-26 | End: 2022-06-01

## 2022-05-26 RX ORDER — BACITRACIN ZINC AND POLYMYXIN B SULFATE 500; 1000 [USP'U]/G; [USP'U]/G
OINTMENT TOPICAL 3 TIMES DAILY
Status: DISPENSED | OUTPATIENT
Start: 2022-05-27 | End: 2022-06-03

## 2022-05-26 RX ORDER — SODIUM CHLORIDE, SODIUM LACTATE, POTASSIUM CHLORIDE, AND CALCIUM CHLORIDE .6; .31; .03; .02 G/100ML; G/100ML; G/100ML; G/100ML
INJECTION, SOLUTION INTRAVENOUS
Status: COMPLETED | OUTPATIENT
Start: 2022-05-26 | End: 2022-05-26

## 2022-05-26 RX ADMIN — IOHEXOL 80 ML: 350 INJECTION, SOLUTION INTRAVENOUS at 22:11

## 2022-05-26 RX ADMIN — FENTANYL CITRATE 100 MCG: 50 INJECTION, SOLUTION INTRAMUSCULAR; INTRAVENOUS at 23:53

## 2022-05-26 RX ADMIN — GABAPENTIN 100 MG: 100 CAPSULE ORAL at 23:25

## 2022-05-26 RX ADMIN — FENTANYL CITRATE 50 MCG: 50 INJECTION, SOLUTION INTRAMUSCULAR; INTRAVENOUS at 22:52

## 2022-05-26 RX ADMIN — METAXALONE 800 MG: 800 TABLET ORAL at 22:47

## 2022-05-26 RX ADMIN — FENTANYL CITRATE 50 MCG: 50 INJECTION, SOLUTION INTRAMUSCULAR; INTRAVENOUS at 21:55

## 2022-05-26 RX ADMIN — LIDOCAINE 1 PATCH: 50 PATCH TOPICAL at 23:26

## 2022-05-26 RX ADMIN — ACETAMINOPHEN 650 MG: 325 TABLET ORAL at 23:27

## 2022-05-26 RX ADMIN — HYDROMORPHONE HYDROCHLORIDE 0.5 MG: 1 INJECTION, SOLUTION INTRAMUSCULAR; INTRAVENOUS; SUBCUTANEOUS at 22:19

## 2022-05-26 RX ADMIN — ONDANSETRON 4 MG: 2 INJECTION INTRAMUSCULAR; INTRAVENOUS at 23:13

## 2022-05-26 RX ADMIN — SODIUM CHLORIDE, POTASSIUM CHLORIDE, SODIUM LACTATE AND CALCIUM CHLORIDE: 600; 310; 30; 20 INJECTION, SOLUTION INTRAVENOUS at 22:28

## 2022-05-26 RX ADMIN — SODIUM CHLORIDE, POTASSIUM CHLORIDE, SODIUM LACTATE AND CALCIUM CHLORIDE 1000 ML: 600; 310; 30; 20 INJECTION, SOLUTION INTRAVENOUS at 21:52

## 2022-05-26 RX ADMIN — CLOSTRIDIUM TETANI TOXOID ANTIGEN (FORMALDEHYDE INACTIVATED), CORYNEBACTERIUM DIPHTHERIAE TOXOID ANTIGEN (FORMALDEHYDE INACTIVATED), BORDETELLA PERTUSSIS TOXOID ANTIGEN (GLUTARALDEHYDE INACTIVATED), BORDETELLA PERTUSSIS FILAMENTOUS HEMAGGLUTININ ANTIGEN (FORMALDEHYDE INACTIVATED), BORDETELLA PERTUSSIS PERTACTIN ANTIGEN, AND BORDETELLA PERTUSSIS FIMBRIAE 2/3 ANTIGEN 0.5 ML: 5; 2; 2.5; 5; 3; 5 INJECTION, SUSPENSION INTRAMUSCULAR at 22:00

## 2022-05-26 RX ADMIN — SODIUM CHLORIDE, POTASSIUM CHLORIDE, SODIUM LACTATE AND CALCIUM CHLORIDE 1000 ML: 600; 310; 30; 20 INJECTION, SOLUTION INTRAVENOUS at 23:26

## 2022-05-26 ASSESSMENT — PATIENT HEALTH QUESTIONNAIRE - PHQ9
2. FEELING DOWN, DEPRESSED, IRRITABLE, OR HOPELESS: NOT AT ALL
SUM OF ALL RESPONSES TO PHQ9 QUESTIONS 1 AND 2: 0
1. LITTLE INTEREST OR PLEASURE IN DOING THINGS: NOT AT ALL

## 2022-05-26 ASSESSMENT — FIBROSIS 4 INDEX
FIB4 SCORE: 0.85
FIB4 SCORE: 0.69

## 2022-05-26 ASSESSMENT — PAIN DESCRIPTION - PAIN TYPE: TYPE: ACUTE PAIN

## 2022-05-27 ENCOUNTER — ANESTHESIA (OUTPATIENT)
Dept: SURGERY | Facility: MEDICAL CENTER | Age: 37
DRG: 957 | End: 2022-05-27
Payer: COMMERCIAL

## 2022-05-27 ENCOUNTER — ANESTHESIA EVENT (OUTPATIENT)
Dept: SURGERY | Facility: MEDICAL CENTER | Age: 37
DRG: 957 | End: 2022-05-27
Payer: COMMERCIAL

## 2022-05-27 ENCOUNTER — APPOINTMENT (OUTPATIENT)
Dept: RADIOLOGY | Facility: MEDICAL CENTER | Age: 37
DRG: 957 | End: 2022-05-27
Attending: SPECIALIST
Payer: COMMERCIAL

## 2022-05-27 ENCOUNTER — APPOINTMENT (OUTPATIENT)
Dept: RADIOLOGY | Facility: MEDICAL CENTER | Age: 37
DRG: 957 | End: 2022-05-27
Attending: ORTHOPAEDIC SURGERY
Payer: COMMERCIAL

## 2022-05-27 ENCOUNTER — APPOINTMENT (OUTPATIENT)
Dept: RADIOLOGY | Facility: MEDICAL CENTER | Age: 37
DRG: 957 | End: 2022-05-27
Attending: NURSE PRACTITIONER
Payer: COMMERCIAL

## 2022-05-27 PROBLEM — T79.4XXA TRAUMATIC HEMORRHAGIC SHOCK (HCC): Status: RESOLVED | Noted: 2022-05-26 | Resolved: 2022-05-27

## 2022-05-27 PROBLEM — M25.522 LEFT ELBOW PAIN: Status: RESOLVED | Noted: 2022-05-27 | Resolved: 2022-05-27

## 2022-05-27 PROBLEM — M25.522 LEFT ELBOW PAIN: Status: ACTIVE | Noted: 2022-05-27

## 2022-05-27 PROBLEM — Z78.9 ALCOHOL USE: Status: ACTIVE | Noted: 2022-05-27

## 2022-05-27 PROBLEM — S82.892A ANKLE FRACTURE, LEFT, CLOSED, INITIAL ENCOUNTER: Status: ACTIVE | Noted: 2022-05-27

## 2022-05-27 LAB
ABO + RH BLD: NORMAL
ALBUMIN SERPL BCP-MCNC: 3.5 G/DL (ref 3.2–4.9)
ALBUMIN/GLOB SERPL: 1.8 G/DL
ALP SERPL-CCNC: 89 U/L (ref 30–99)
ALT SERPL-CCNC: 223 U/L (ref 2–50)
AMPHET UR QL SCN: NEGATIVE
ANION GAP SERPL CALC-SCNC: 14 MMOL/L (ref 7–16)
AST SERPL-CCNC: 305 U/L (ref 12–45)
BARBITURATES UR QL SCN: NEGATIVE
BASOPHILS # BLD AUTO: 0 % (ref 0–1.8)
BASOPHILS # BLD: 0 K/UL (ref 0–0.12)
BENZODIAZ UR QL SCN: NEGATIVE
BILIRUB SERPL-MCNC: 0.6 MG/DL (ref 0.1–1.5)
BUN SERPL-MCNC: 15 MG/DL (ref 8–22)
BZE UR QL SCN: NEGATIVE
CALCIUM SERPL-MCNC: 8.2 MG/DL (ref 8.5–10.5)
CANNABINOIDS UR QL SCN: POSITIVE
CHLORIDE SERPL-SCNC: 106 MMOL/L (ref 96–112)
CK SERPL-CCNC: 1671 U/L (ref 0–154)
CO2 SERPL-SCNC: 20 MMOL/L (ref 20–33)
CREAT SERPL-MCNC: 0.96 MG/DL (ref 0.5–1.4)
EOSINOPHIL # BLD AUTO: 0 K/UL (ref 0–0.51)
EOSINOPHIL NFR BLD: 0 % (ref 0–6.9)
ERYTHROCYTE [DISTWIDTH] IN BLOOD BY AUTOMATED COUNT: 47.6 FL (ref 35.9–50)
FLUAV RNA SPEC QL NAA+PROBE: NEGATIVE
FLUBV RNA SPEC QL NAA+PROBE: NEGATIVE
GFR SERPLBLD CREATININE-BSD FMLA CKD-EPI: 105 ML/MIN/1.73 M 2
GLOBULIN SER CALC-MCNC: 1.9 G/DL (ref 1.9–3.5)
GLUCOSE BLD STRIP.AUTO-MCNC: 144 MG/DL (ref 65–99)
GLUCOSE BLD STRIP.AUTO-MCNC: 160 MG/DL (ref 65–99)
GLUCOSE BLD STRIP.AUTO-MCNC: 181 MG/DL (ref 65–99)
GLUCOSE SERPL-MCNC: 150 MG/DL (ref 65–99)
HCT VFR BLD AUTO: 30 % (ref 42–52)
HCT VFR BLD AUTO: 37.2 % (ref 42–52)
HGB BLD-MCNC: 10.3 G/DL (ref 14–18)
HGB BLD-MCNC: 13 G/DL (ref 14–18)
LACTATE BLD-SCNC: 2.3 MMOL/L (ref 0.5–2)
LACTATE BLD-SCNC: 2.7 MMOL/L (ref 0.5–2)
LYMPHOCYTES # BLD AUTO: 0 K/UL (ref 1–4.8)
LYMPHOCYTES NFR BLD: 0 % (ref 22–41)
MANUAL DIFF BLD: NORMAL
MCH RBC QN AUTO: 31.6 PG (ref 27–33)
MCHC RBC AUTO-ENTMCNC: 34.9 G/DL (ref 33.7–35.3)
MCV RBC AUTO: 90.5 FL (ref 81.4–97.8)
METAMYELOCYTES NFR BLD MANUAL: 0.9 %
METHADONE UR QL SCN: NEGATIVE
MONOCYTES # BLD AUTO: 1.37 K/UL (ref 0–0.85)
MONOCYTES NFR BLD AUTO: 3.4 % (ref 0–13.4)
MORPHOLOGY BLD-IMP: NORMAL
NEUTROPHILS # BLD AUTO: 38.57 K/UL (ref 1.82–7.42)
NEUTROPHILS NFR BLD: 95.7 % (ref 44–72)
NRBC # BLD AUTO: 0 K/UL
NRBC BLD-RTO: 0 /100 WBC
OPIATES UR QL SCN: NEGATIVE
OXYCODONE UR QL SCN: NEGATIVE
PCP UR QL SCN: NEGATIVE
PLATELET # BLD AUTO: 177 K/UL (ref 164–446)
PLATELET BLD QL SMEAR: NORMAL
PMV BLD AUTO: 9.3 FL (ref 9–12.9)
POTASSIUM SERPL-SCNC: 3.8 MMOL/L (ref 3.6–5.5)
PROPOXYPH UR QL SCN: NEGATIVE
PROT SERPL-MCNC: 5.4 G/DL (ref 6–8.2)
RBC # BLD AUTO: 4.11 M/UL (ref 4.7–6.1)
RBC BLD AUTO: NORMAL
RSV RNA SPEC QL NAA+PROBE: NEGATIVE
SARS-COV-2 RNA RESP QL NAA+PROBE: NOTDETECTED
SODIUM SERPL-SCNC: 140 MMOL/L (ref 135–145)
SPECIMEN SOURCE: NORMAL
WBC # BLD AUTO: 40.3 K/UL (ref 4.8–10.8)

## 2022-05-27 PROCEDURE — 500891 HCHG PACK, ORTHO MAJOR: Performed by: ORTHOPAEDIC SURGERY

## 2022-05-27 PROCEDURE — 96372 THER/PROPH/DIAG INJ SC/IM: CPT

## 2022-05-27 PROCEDURE — 160009 HCHG ANES TIME/MIN: Performed by: ORTHOPAEDIC SURGERY

## 2022-05-27 PROCEDURE — 0QS334Z REPOSITION LEFT PELVIC BONE WITH INTERNAL FIXATION DEVICE, PERCUTANEOUS APPROACH: ICD-10-PCS | Performed by: ORTHOPAEDIC SURGERY

## 2022-05-27 PROCEDURE — 01120 ANES PX ON BONY PELVIS: CPT | Performed by: ANESTHESIOLOGY

## 2022-05-27 PROCEDURE — 96376 TX/PRO/DX INJ SAME DRUG ADON: CPT

## 2022-05-27 PROCEDURE — 99233 SBSQ HOSP IP/OBS HIGH 50: CPT | Performed by: SURGERY

## 2022-05-27 PROCEDURE — 770022 HCHG ROOM/CARE - ICU (200)

## 2022-05-27 PROCEDURE — 83605 ASSAY OF LACTIC ACID: CPT

## 2022-05-27 PROCEDURE — 501838 HCHG SUTURE GENERAL: Performed by: ORTHOPAEDIC SURGERY

## 2022-05-27 PROCEDURE — 700101 HCHG RX REV CODE 250: Performed by: ANESTHESIOLOGY

## 2022-05-27 PROCEDURE — 700111 HCHG RX REV CODE 636 W/ 250 OVERRIDE (IP): Performed by: ORTHOPAEDIC SURGERY

## 2022-05-27 PROCEDURE — 700105 HCHG RX REV CODE 258: Performed by: ANESTHESIOLOGY

## 2022-05-27 PROCEDURE — 700111 HCHG RX REV CODE 636 W/ 250 OVERRIDE (IP): Performed by: SURGERY

## 2022-05-27 PROCEDURE — 85007 BL SMEAR W/DIFF WBC COUNT: CPT

## 2022-05-27 PROCEDURE — 160048 HCHG OR STATISTICAL LEVEL 1-5: Performed by: ORTHOPAEDIC SURGERY

## 2022-05-27 PROCEDURE — A9270 NON-COVERED ITEM OR SERVICE: HCPCS | Performed by: ANESTHESIOLOGY

## 2022-05-27 PROCEDURE — 85025 COMPLETE CBC W/AUTO DIFF WBC: CPT

## 2022-05-27 PROCEDURE — 160042 HCHG SURGERY MINUTES - EA ADDL 1 MIN LEVEL 5: Performed by: ORTHOPAEDIC SURGERY

## 2022-05-27 PROCEDURE — 0SS834Z REPOSITION LEFT SACROILIAC JOINT WITH INTERNAL FIXATION DEVICE, PERCUTANEOUS APPROACH: ICD-10-PCS | Performed by: ORTHOPAEDIC SURGERY

## 2022-05-27 PROCEDURE — 500382 HCHG DRAIN, PENROSE 1X18: Performed by: ORTHOPAEDIC SURGERY

## 2022-05-27 PROCEDURE — 502000 HCHG MISC OR IMPLANTS RC 0278: Performed by: ORTHOPAEDIC SURGERY

## 2022-05-27 PROCEDURE — A9270 NON-COVERED ITEM OR SERVICE: HCPCS | Performed by: SPECIALIST

## 2022-05-27 PROCEDURE — 82962 GLUCOSE BLOOD TEST: CPT

## 2022-05-27 PROCEDURE — 700101 HCHG RX REV CODE 250: Performed by: SPECIALIST

## 2022-05-27 PROCEDURE — 700102 HCHG RX REV CODE 250 W/ 637 OVERRIDE(OP): Performed by: SPECIALIST

## 2022-05-27 PROCEDURE — 700102 HCHG RX REV CODE 250 W/ 637 OVERRIDE(OP): Performed by: ANESTHESIOLOGY

## 2022-05-27 PROCEDURE — 160031 HCHG SURGERY MINUTES - 1ST 30 MINS LEVEL 5: Performed by: ORTHOPAEDIC SURGERY

## 2022-05-27 PROCEDURE — 71045 X-RAY EXAM CHEST 1 VIEW: CPT

## 2022-05-27 PROCEDURE — 85014 HEMATOCRIT: CPT

## 2022-05-27 PROCEDURE — 80053 COMPREHEN METABOLIC PANEL: CPT

## 2022-05-27 PROCEDURE — 500112 HCHG BONE WAX: Performed by: ORTHOPAEDIC SURGERY

## 2022-05-27 PROCEDURE — 700105 HCHG RX REV CODE 258: Performed by: SPECIALIST

## 2022-05-27 PROCEDURE — 500681: Performed by: ORTHOPAEDIC SURGERY

## 2022-05-27 PROCEDURE — 160002 HCHG RECOVERY MINUTES (STAT): Performed by: ORTHOPAEDIC SURGERY

## 2022-05-27 PROCEDURE — 73600 X-RAY EXAM OF ANKLE: CPT | Mod: LT

## 2022-05-27 PROCEDURE — 160035 HCHG PACU - 1ST 60 MINS PHASE I: Performed by: ORTHOPAEDIC SURGERY

## 2022-05-27 PROCEDURE — 72190 X-RAY EXAM OF PELVIS: CPT

## 2022-05-27 PROCEDURE — 0QS234Z REPOSITION RIGHT PELVIC BONE WITH INTERNAL FIXATION DEVICE, PERCUTANEOUS APPROACH: ICD-10-PCS | Performed by: ORTHOPAEDIC SURGERY

## 2022-05-27 PROCEDURE — 82550 ASSAY OF CK (CPK): CPT

## 2022-05-27 PROCEDURE — 700111 HCHG RX REV CODE 636 W/ 250 OVERRIDE (IP): Performed by: ANESTHESIOLOGY

## 2022-05-27 PROCEDURE — 85018 HEMOGLOBIN: CPT

## 2022-05-27 PROCEDURE — 160036 HCHG PACU - EA ADDL 30 MINS PHASE I: Performed by: ORTHOPAEDIC SURGERY

## 2022-05-27 PROCEDURE — C1713 ANCHOR/SCREW BN/BN,TIS/BN: HCPCS | Performed by: ORTHOPAEDIC SURGERY

## 2022-05-27 DEVICE — IMPLANTABLE DEVICE: Type: IMPLANTABLE DEVICE | Site: PELVIS | Status: FUNCTIONAL

## 2022-05-27 DEVICE — TI ASNIS III WASHER FOR 6.58.0MM SCREWS: Type: IMPLANTABLE DEVICE | Site: PELVIS | Status: FUNCTIONAL

## 2022-05-27 RX ORDER — PHENYLEPHRINE HYDROCHLORIDE 10 MG/ML
INJECTION, SOLUTION INTRAMUSCULAR; INTRAVENOUS; SUBCUTANEOUS PRN
Status: DISCONTINUED | OUTPATIENT
Start: 2022-05-27 | End: 2022-05-27 | Stop reason: SURG

## 2022-05-27 RX ORDER — ONDANSETRON 2 MG/ML
4 INJECTION INTRAMUSCULAR; INTRAVENOUS
Status: DISCONTINUED | OUTPATIENT
Start: 2022-05-27 | End: 2022-05-27 | Stop reason: HOSPADM

## 2022-05-27 RX ORDER — KETOROLAC TROMETHAMINE 30 MG/ML
INJECTION, SOLUTION INTRAMUSCULAR; INTRAVENOUS PRN
Status: DISCONTINUED | OUTPATIENT
Start: 2022-05-27 | End: 2022-05-27 | Stop reason: SURG

## 2022-05-27 RX ORDER — SODIUM CHLORIDE, SODIUM LACTATE, POTASSIUM CHLORIDE, CALCIUM CHLORIDE 600; 310; 30; 20 MG/100ML; MG/100ML; MG/100ML; MG/100ML
1000 INJECTION, SOLUTION INTRAVENOUS ONCE
Status: ACTIVE | OUTPATIENT
Start: 2022-05-27 | End: 2022-05-28

## 2022-05-27 RX ORDER — CEFAZOLIN SODIUM 1 G/3ML
INJECTION, POWDER, FOR SOLUTION INTRAMUSCULAR; INTRAVENOUS PRN
Status: DISCONTINUED | OUTPATIENT
Start: 2022-05-27 | End: 2022-05-27 | Stop reason: SURG

## 2022-05-27 RX ORDER — HALOPERIDOL 5 MG/ML
1 INJECTION INTRAMUSCULAR
Status: DISCONTINUED | OUTPATIENT
Start: 2022-05-27 | End: 2022-05-27 | Stop reason: HOSPADM

## 2022-05-27 RX ORDER — DEXAMETHASONE SODIUM PHOSPHATE 4 MG/ML
INJECTION, SOLUTION INTRA-ARTICULAR; INTRALESIONAL; INTRAMUSCULAR; INTRAVENOUS; SOFT TISSUE PRN
Status: DISCONTINUED | OUTPATIENT
Start: 2022-05-27 | End: 2022-05-27 | Stop reason: SURG

## 2022-05-27 RX ORDER — ALBUTEROL SULFATE 2.5 MG/3ML
2.5 SOLUTION RESPIRATORY (INHALATION)
Status: DISCONTINUED | OUTPATIENT
Start: 2022-05-27 | End: 2022-05-27 | Stop reason: HOSPADM

## 2022-05-27 RX ORDER — OXYCODONE HCL 5 MG/5 ML
5 SOLUTION, ORAL ORAL
Status: COMPLETED | OUTPATIENT
Start: 2022-05-27 | End: 2022-05-27

## 2022-05-27 RX ORDER — HYDROMORPHONE HYDROCHLORIDE 1 MG/ML
0.2 INJECTION, SOLUTION INTRAMUSCULAR; INTRAVENOUS; SUBCUTANEOUS
Status: DISCONTINUED | OUTPATIENT
Start: 2022-05-27 | End: 2022-05-27 | Stop reason: HOSPADM

## 2022-05-27 RX ORDER — OXYCODONE HCL 5 MG/5 ML
10 SOLUTION, ORAL ORAL
Status: COMPLETED | OUTPATIENT
Start: 2022-05-27 | End: 2022-05-27

## 2022-05-27 RX ORDER — MEPERIDINE HYDROCHLORIDE 25 MG/ML
12.5 INJECTION INTRAMUSCULAR; INTRAVENOUS; SUBCUTANEOUS
Status: DISCONTINUED | OUTPATIENT
Start: 2022-05-27 | End: 2022-05-27 | Stop reason: HOSPADM

## 2022-05-27 RX ORDER — ONDANSETRON 2 MG/ML
INJECTION INTRAMUSCULAR; INTRAVENOUS PRN
Status: DISCONTINUED | OUTPATIENT
Start: 2022-05-27 | End: 2022-05-27 | Stop reason: SURG

## 2022-05-27 RX ORDER — HYDROMORPHONE HYDROCHLORIDE 1 MG/ML
0.4 INJECTION, SOLUTION INTRAMUSCULAR; INTRAVENOUS; SUBCUTANEOUS
Status: DISCONTINUED | OUTPATIENT
Start: 2022-05-27 | End: 2022-05-27 | Stop reason: HOSPADM

## 2022-05-27 RX ORDER — HYDROMORPHONE HYDROCHLORIDE 2 MG/ML
INJECTION, SOLUTION INTRAMUSCULAR; INTRAVENOUS; SUBCUTANEOUS PRN
Status: DISCONTINUED | OUTPATIENT
Start: 2022-05-27 | End: 2022-05-27 | Stop reason: SURG

## 2022-05-27 RX ORDER — SODIUM CHLORIDE, SODIUM LACTATE, POTASSIUM CHLORIDE, CALCIUM CHLORIDE 600; 310; 30; 20 MG/100ML; MG/100ML; MG/100ML; MG/100ML
INJECTION, SOLUTION INTRAVENOUS CONTINUOUS
Status: DISCONTINUED | OUTPATIENT
Start: 2022-05-27 | End: 2022-05-27 | Stop reason: HOSPADM

## 2022-05-27 RX ORDER — HYDROMORPHONE HYDROCHLORIDE 1 MG/ML
0.1 INJECTION, SOLUTION INTRAMUSCULAR; INTRAVENOUS; SUBCUTANEOUS
Status: DISCONTINUED | OUTPATIENT
Start: 2022-05-27 | End: 2022-05-27 | Stop reason: HOSPADM

## 2022-05-27 RX ORDER — LIDOCAINE HYDROCHLORIDE 20 MG/ML
INJECTION, SOLUTION EPIDURAL; INFILTRATION; INTRACAUDAL; PERINEURAL PRN
Status: DISCONTINUED | OUTPATIENT
Start: 2022-05-27 | End: 2022-05-27 | Stop reason: SURG

## 2022-05-27 RX ORDER — CEFAZOLIN SODIUM 2 G/100ML
2 INJECTION, SOLUTION INTRAVENOUS EVERY 8 HOURS
Status: COMPLETED | OUTPATIENT
Start: 2022-05-27 | End: 2022-05-28

## 2022-05-27 RX ORDER — SODIUM CHLORIDE, SODIUM LACTATE, POTASSIUM CHLORIDE, CALCIUM CHLORIDE 600; 310; 30; 20 MG/100ML; MG/100ML; MG/100ML; MG/100ML
INJECTION, SOLUTION INTRAVENOUS
Status: DISCONTINUED | OUTPATIENT
Start: 2022-05-27 | End: 2022-05-27 | Stop reason: SURG

## 2022-05-27 RX ORDER — METOCLOPRAMIDE HYDROCHLORIDE 5 MG/ML
INJECTION INTRAMUSCULAR; INTRAVENOUS PRN
Status: DISCONTINUED | OUTPATIENT
Start: 2022-05-27 | End: 2022-05-27 | Stop reason: SURG

## 2022-05-27 RX ADMIN — FENTANYL CITRATE 50 MCG: 50 INJECTION, SOLUTION INTRAMUSCULAR; INTRAVENOUS at 10:19

## 2022-05-27 RX ADMIN — METAXALONE 800 MG: 800 TABLET ORAL at 17:32

## 2022-05-27 RX ADMIN — OXYCODONE HYDROCHLORIDE 10 MG: 5 SOLUTION ORAL at 12:47

## 2022-05-27 RX ADMIN — ONDANSETRON 4 MG: 2 INJECTION INTRAMUSCULAR; INTRAVENOUS at 11:39

## 2022-05-27 RX ADMIN — HYDROMORPHONE HYDROCHLORIDE 0.4 MG: 2 INJECTION INTRAMUSCULAR; INTRAVENOUS; SUBCUTANEOUS at 11:07

## 2022-05-27 RX ADMIN — METOCLOPRAMIDE 10 MG: 5 INJECTION, SOLUTION INTRAMUSCULAR; INTRAVENOUS at 11:39

## 2022-05-27 RX ADMIN — SUGAMMADEX 200 MG: 100 INJECTION, SOLUTION INTRAVENOUS at 11:43

## 2022-05-27 RX ADMIN — GABAPENTIN 100 MG: 100 CAPSULE ORAL at 06:06

## 2022-05-27 RX ADMIN — Medication 1 EACH: at 17:32

## 2022-05-27 RX ADMIN — INSULIN HUMAN 1 UNITS: 100 INJECTION, SOLUTION PARENTERAL at 17:49

## 2022-05-27 RX ADMIN — PHENYLEPHRINE HYDROCHLORIDE 200 MCG: 10 INJECTION INTRAVENOUS at 09:59

## 2022-05-27 RX ADMIN — ROCURONIUM BROMIDE 50 MG: 10 INJECTION, SOLUTION INTRAVENOUS at 10:00

## 2022-05-27 RX ADMIN — METAXALONE 800 MG: 800 TABLET ORAL at 06:07

## 2022-05-27 RX ADMIN — FENTANYL CITRATE 100 MCG: 50 INJECTION, SOLUTION INTRAMUSCULAR; INTRAVENOUS at 03:00

## 2022-05-27 RX ADMIN — OXYCODONE HYDROCHLORIDE 10 MG: 10 TABLET ORAL at 18:56

## 2022-05-27 RX ADMIN — FENTANYL CITRATE 50 MCG: 50 INJECTION, SOLUTION INTRAMUSCULAR; INTRAVENOUS at 10:58

## 2022-05-27 RX ADMIN — ROCURONIUM BROMIDE 20 MG: 10 INJECTION, SOLUTION INTRAVENOUS at 11:05

## 2022-05-27 RX ADMIN — OXYCODONE HYDROCHLORIDE 10 MG: 10 TABLET ORAL at 04:14

## 2022-05-27 RX ADMIN — PHENYLEPHRINE HYDROCHLORIDE 200 MCG: 10 INJECTION INTRAVENOUS at 10:04

## 2022-05-27 RX ADMIN — SODIUM CHLORIDE, POTASSIUM CHLORIDE, SODIUM LACTATE AND CALCIUM CHLORIDE: 600; 310; 30; 20 INJECTION, SOLUTION INTRAVENOUS at 05:59

## 2022-05-27 RX ADMIN — CEFAZOLIN 2 G: 330 INJECTION, POWDER, FOR SOLUTION INTRAMUSCULAR; INTRAVENOUS at 09:49

## 2022-05-27 RX ADMIN — PROPOFOL 200 MG: 10 INJECTION, EMULSION INTRAVENOUS at 09:49

## 2022-05-27 RX ADMIN — ACETAMINOPHEN 650 MG: 325 TABLET ORAL at 06:06

## 2022-05-27 RX ADMIN — SODIUM CHLORIDE, POTASSIUM CHLORIDE, SODIUM LACTATE AND CALCIUM CHLORIDE: 600; 310; 30; 20 INJECTION, SOLUTION INTRAVENOUS at 15:00

## 2022-05-27 RX ADMIN — PHENYLEPHRINE HYDROCHLORIDE 200 MCG: 10 INJECTION INTRAVENOUS at 09:56

## 2022-05-27 RX ADMIN — DEXAMETHASONE SODIUM PHOSPHATE 8 MG: 4 INJECTION, SOLUTION INTRA-ARTICULAR; INTRALESIONAL; INTRAMUSCULAR; INTRAVENOUS; SOFT TISSUE at 09:49

## 2022-05-27 RX ADMIN — VENLAFAXINE HYDROCHLORIDE 75 MG: 75 CAPSULE, EXTENDED RELEASE ORAL at 06:06

## 2022-05-27 RX ADMIN — HYDROMORPHONE HYDROCHLORIDE 0.4 MG: 2 INJECTION INTRAMUSCULAR; INTRAVENOUS; SUBCUTANEOUS at 11:27

## 2022-05-27 RX ADMIN — ACETAMINOPHEN 650 MG: 325 TABLET ORAL at 17:31

## 2022-05-27 RX ADMIN — Medication 1 EACH: at 06:07

## 2022-05-27 RX ADMIN — OXYCODONE HYDROCHLORIDE 10 MG: 10 TABLET ORAL at 00:38

## 2022-05-27 RX ADMIN — MIDAZOLAM 2 MG: 1 INJECTION INTRAMUSCULAR; INTRAVENOUS at 09:47

## 2022-05-27 RX ADMIN — FENTANYL CITRATE 50 MCG: 50 INJECTION, SOLUTION INTRAMUSCULAR; INTRAVENOUS at 10:32

## 2022-05-27 RX ADMIN — INSULIN HUMAN 1 UNITS: 100 INJECTION, SOLUTION PARENTERAL at 06:32

## 2022-05-27 RX ADMIN — FENTANYL CITRATE 100 MCG: 50 INJECTION, SOLUTION INTRAMUSCULAR; INTRAVENOUS at 09:47

## 2022-05-27 RX ADMIN — FENTANYL CITRATE 100 MCG: 50 INJECTION, SOLUTION INTRAMUSCULAR; INTRAVENOUS at 00:57

## 2022-05-27 RX ADMIN — CEFAZOLIN SODIUM 2 G: 2 INJECTION, SOLUTION INTRAVENOUS at 17:32

## 2022-05-27 RX ADMIN — KETOROLAC TROMETHAMINE 30 MG: 30 INJECTION, SOLUTION INTRAMUSCULAR at 11:39

## 2022-05-27 RX ADMIN — GABAPENTIN 100 MG: 100 CAPSULE ORAL at 15:02

## 2022-05-27 RX ADMIN — SODIUM CHLORIDE, POTASSIUM CHLORIDE, SODIUM LACTATE AND CALCIUM CHLORIDE: 600; 310; 30; 20 INJECTION, SOLUTION INTRAVENOUS at 09:47

## 2022-05-27 RX ADMIN — LIDOCAINE HYDROCHLORIDE 100 MG: 20 INJECTION, SOLUTION EPIDURAL; INFILTRATION; INTRACAUDAL at 09:49

## 2022-05-27 ASSESSMENT — ENCOUNTER SYMPTOMS
CONSTIPATION: 1
ROS GI COMMENTS: BM PTA
MYALGIAS: 1
VOMITING: 0
NAUSEA: 0
SHORTNESS OF BREATH: 0
ABDOMINAL PAIN: 0
TINGLING: 0
SENSORY CHANGE: 0
HEADACHES: 0
COUGH: 0
BACK PAIN: 0
WEAKNESS: 0
NECK PAIN: 0
DIZZINESS: 0
FEVER: 0

## 2022-05-27 ASSESSMENT — PATIENT HEALTH QUESTIONNAIRE - PHQ9
SUM OF ALL RESPONSES TO PHQ9 QUESTIONS 1 AND 2: 0
2. FEELING DOWN, DEPRESSED, IRRITABLE, OR HOPELESS: NOT AT ALL
1. LITTLE INTEREST OR PLEASURE IN DOING THINGS: NOT AT ALL
1. LITTLE INTEREST OR PLEASURE IN DOING THINGS: NOT AT ALL
2. FEELING DOWN, DEPRESSED, IRRITABLE, OR HOPELESS: NOT AT ALL
SUM OF ALL RESPONSES TO PHQ9 QUESTIONS 1 AND 2: 0

## 2022-05-27 ASSESSMENT — PAIN DESCRIPTION - PAIN TYPE
TYPE: SURGICAL PAIN
TYPE: ACUTE PAIN
TYPE: SURGICAL PAIN
TYPE: ACUTE PAIN
TYPE: SURGICAL PAIN
TYPE: SURGICAL PAIN

## 2022-05-27 ASSESSMENT — COPD QUESTIONNAIRES
COPD SCREENING SCORE: 1
DO YOU EVER COUGH UP ANY MUCUS OR PHLEGM?: YES, A FEW DAYS A WEEK OR MONTH
HAVE YOU SMOKED AT LEAST 100 CIGARETTES IN YOUR ENTIRE LIFE: NO/DON'T KNOW
DURING THE PAST 4 WEEKS HOW MUCH DID YOU FEEL SHORT OF BREATH: NONE/LITTLE OF THE TIME

## 2022-05-27 NOTE — CARE PLAN
The patient is Watcher - Medium risk of patient condition declining or worsening    Shift Goals  Clinical Goals: q1 neuro checks, trend lactic and H&H, hemodynamic stability  Patient Goals: pain control  Family Goals: no family present    Progress made toward(s) clinical / shift goals:  trend lactic + H&H     Patient is not progressing towards the following goals: n/a      Problem: Pain - Standard  Goal: Alleviation of pain or a reduction in pain to the patient’s comfort goal  Outcome: Progressing

## 2022-05-27 NOTE — PROGRESS NOTES
Patient admitted to [facility]    A&Ox4, complaining of 7/10 pain, 0.5 mg dilaudid order by Kathie APONTE and administered    2 RN skin check completed with Lilly  Blanching redness on back  Right lower leg and ankle abrasion  LLQ abrasion   Left hip abrasion  Left foot/ankle skin tear, swelling and bruising    Belongings at bedside include:  underwear

## 2022-05-27 NOTE — ASSESSMENT & PLAN NOTE
Anterior left pneumothorax.  Chest tube not indicated at time of admission.  5/29 CXR without pneumothorax.  Aggressive pulmonary hygiene and serial chest radiography.

## 2022-05-27 NOTE — PROGRESS NOTES
Dr Palak Han to bedside to evaluate patient. Discussed need to go to OR tomorrow for pelvic fixation. All patient questions answered. Per MD patient is bedrest, remain flat, NPO at midnight for OR in AM.
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
## 2022-05-27 NOTE — ASSESSMENT & PLAN NOTE
Left posterior second through fifth and seventh through 11th rib fractures. Third through eighth rib flail segments.  Posterior right 10th rib fracture.  Aggressive pulmonary hygiene and multimodal pain management.
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
## 2022-05-27 NOTE — PROGRESS NOTES
Patient transferred to Pre-op area via bed with transporter and myself on ICU transport monitor. Patent is awake, alert and oriented x 4 with no distress noted. Handoff to pre-op RN.

## 2022-05-27 NOTE — PROGRESS NOTES
Patient in PACU in stable condition. VSS. Surgical dressings clean dry intact x2. Will continue to monitor and medicate appropriately.
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
## 2022-05-27 NOTE — PROGRESS NOTES
ORTHO RESPONSE TIME:  I was contacted by Dr. Meza at 10:14pm requesting a formal orthopaedic consultation.  I responded to the consultation request at 10:15pm while I was in the operating room.  I physically evaluated the patient at 11:10pm in the ICU.    Manolo Han M.D.

## 2022-05-27 NOTE — ANESTHESIA PREPROCEDURE EVALUATION
Case: [id number] Anesthesia Start Date/Time: 05/27/22 0947    Procedure: ORIF, PELVIS - FLAT OSI TABLE  RAJENDRA PELVIS PLATES  RAJENDRA SYNTHES   OIC CANNULATED SCREWS    Location: [facility] OR 16 / SURGERY [facility]    Surgeons: Manolo Han M.D.      Depression/Anxiety    Relevant Problems   PULMONARY   (positive) History of hepatitis C      NEURO   (positive) History of hepatitis C      Other   (positive) Closed bilateral fracture of pubic rami (HCC)   (positive) Closed fracture of multiple ribs of both sides   (positive) Closed fracture of transverse process of lumbar vertebra (HCC)   (positive) Contusion of both lungs   (positive) Pneumothorax on left   (positive) Trauma       Physical Exam    Airway   Mallampati: II  TM distance: >3 FB  Neck ROM: full       Cardiovascular - normal exam  Rhythm: regular  Rate: abnormal  (-) murmur     Dental   (+) upper dentures           Pulmonary   (+) decreased breath sounds     Abdominal    Neurological - normal exam                 Anesthesia Plan    ASA 2       Plan - general       Airway plan will be LMA          Induction: intravenous    Postoperative Plan: Postoperative administration of opioids is intended.    Pertinent diagnostic labs and testing reviewed    Informed Consent:    Anesthetic plan and risks discussed with patient.    Use of blood products discussed with: patient whom.

## 2022-05-27 NOTE — ASSESSMENT & PLAN NOTE
Pelvic shear injury. Left superior and inferior pubic rami fractures. Right inferior pubic rami fracture and some subtle fracture. Left sacral fracture. Widening of the left SI joint compatible with SI joint injury.  E[place]ingham traction applied on admission.  5/27 ORIF pelvis.  5/28 CT cystogram negative for bladder leak.  5/29 Olmos removal.  Weight bearing status - Touch toe weightbearing LLE x 6-8 weeks post op, WB RLE for transfers only.  Manolo Han MD. Orthopedic Surgeon. [facility].

## 2022-05-27 NOTE — ASSESSMENT & PLAN NOTE
Left L4 and L5 transverse processes fractures.  Neurosurgery consultation not indicated.  Multimodal pain regimen.

## 2022-05-27 NOTE — PROGRESS NOTES
15 lbs of [place] traction applied to pts LLE without issue. Tolerated well by pt at this time.
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
## 2022-05-27 NOTE — RESPIRATORY CARE
Respiratory Trauma Red Note    Attended trauma red. Patient arrived on a 4-6 LPM nasal cannula. Protecting airway with no noted respiratory distress. No other respiratory interventions required at this time.
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
## 2022-05-27 NOTE — ASSESSMENT & PLAN NOTE
Admission WBC 39.6.  Received intraoperative abx.  5/31 WBC 11.9.  6/1 Elevated to 17.3  6/2 WBC trending down. UA negative.  6/3 WBC trended up, 16.1. Afebrile, nontoxic in appearance.  - CT thorax with small left pleural effusion. It is not loculated. Left pneumothorax has resolved.  - bld cx x 2 preliminary negative.  - sputum cx negative. MRSA swab positive.  - Zosyn and Vanco initiated.  6/5 WBC trending down 14.7. Afebrile, nontoxic in appearance.  6/6 Bld cx x 2 and sputum cx negative. Deescalate antibiotic to doxycycline.

## 2022-05-27 NOTE — ASSESSMENT & PLAN NOTE
Ossific density adjacent to the medial malleolus, appearance suggests small ligaments avulsion fracture fragment.  Non-operative management.  Weight bearing status - Touch toe weightbearing LLE, left ankle brace or boot as needed for comfort.  Manolo Han MD. Orthopedic Surgeon. [facility].
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
## 2022-05-27 NOTE — ANESTHESIA TIME REPORT
Anesthesia Start and Stop Event Times     Date Time Event    5/27/2022 0846 Ready for Procedure     0947 Anesthesia Start     1152 Anesthesia Stop        Responsible Staff  05/27/22    Name Role Begin End    Bethel Singletary M.D. Anesth 0947 1152        Overtime Reason:  no overtime (within assigned shift)    Comments:

## 2022-05-27 NOTE — CARE PLAN
The patient is Watcher - Medium risk of patient condition declining or worsening    Shift Goals  Clinical Goals: stable VS, successful surgery  Patient Goals: pain control, be able to drink  Family Goals: DEANN    Progress made toward(s) clinical / shift goals:  VSS, patient back from surgery and doing well with plans to ambulate for dinner.     Patient is not progressing towards the following goals:NA        Problem: Knowledge Deficit - Standard  Goal: Patient and family/care givers will demonstrate understanding of plan of care, disease process/condition, diagnostic tests and medications  Outcome: Progressing     Problem: Pain - Standard  Goal: Alleviation of pain or a reduction in pain to the patient’s comfort goal  Outcome: Progressing     Problem: Skin Integrity  Goal: Skin integrity is maintained or improved  Outcome: Progressing

## 2022-05-27 NOTE — PROGRESS NOTES
"      Mental status adequate for full examination?: Yes    Spine cleared (radiologically and/or clinically): Yes    REVIEW OF SYSTEMS:  Review of Systems   Constitutional: Negative for fever and malaise/fatigue.   Respiratory: Negative for cough and shortness of breath.    Cardiovascular: Negative for chest pain.   Gastrointestinal: Positive for constipation. Negative for abdominal pain, nausea and vomiting.        BM PTA   Musculoskeletal: Positive for myalgias. Negative for back pain and neck pain.        Chest wall pain/left rib pain  Pelvic pain   Neurological: Negative for dizziness, tingling, sensory change, weakness and headaches.       PHYSICAL EXAMINATION:  /85   Pulse (!) 107   Temp 36.2 °C (97.2 °F) (Temporal)   Resp 15   Ht 1.905 m (6' 3\")   Wt 77.3 kg (170 lb 6.7 oz)   SpO2 93%   BMI 21.30 kg/m²   Physical Exam  Vitals and nursing note reviewed.   Constitutional:       General: He is not in acute distress.     Appearance: He is not toxic-appearing.   HENT:      Head: Normocephalic and atraumatic.      Mouth/Throat:      Mouth: Mucous membranes are dry.   Eyes:      Extraocular Movements: Extraocular movements intact.      Conjunctiva/sclera: Conjunctivae normal.   Cardiovascular:      Rate and Rhythm: Normal rate and regular rhythm.      Pulses:           Dorsalis pedis pulses are 2+ on the right side and 2+ on the left side.   Pulmonary:      Effort: Pulmonary effort is normal. No respiratory distress.      Comments: Breath sounds diminished on left   Abdominal:      Tenderness: There is no abdominal tenderness. There is no guarding.      Comments: Minimal abdominal distension   Musculoskeletal:      Cervical back: Normal range of motion and neck supple.      Comments: Left lower extremity in skeletal traction  Pelvic region tenderness, no obvious deformity.   FROM BUE   Skin:     General: Skin is warm and dry.   Neurological:      Mental Status: He is alert.   Psychiatric:         Mood " and Affect: Mood normal.         LABORATORY VALUES:  Recent Labs     05/26/22 2200 05/27/22  0419   WBC 39.6* 40.3*   RBC 4.71 4.11*   HEMOGLOBIN 14.9 13.0*   HEMATOCRIT 43.3 37.2*   MCV 91.9 90.5   MCH 31.6 31.6   MCHC 34.4 34.9   RDW 47.0 47.6   PLATELETCT 197 177   MPV 9.9 9.3     Recent Labs     05/26/22 2200 05/27/22  0204   SODIUM 141 140   POTASSIUM 3.1* 3.8   CHLORIDE 105 106   CO2 18* 20   GLUCOSE 184* 150*   BUN 15 15   CREATININE 1.07 0.96   CALCIUM 8.4* 8.2*     Recent Labs     05/26/22 2200 05/27/22  0204   ASTSGOT 315* 305*   ALTSGPT 229* 223*   TBILIRUBIN 0.3 0.6   ALKPHOSPHAT 105* 89   GLOBULIN 2.2 1.9   INR 1.28*  --      Recent Labs     05/26/22 2200   APTT 26.4   INR 1.28*       IMAGING:  DX-ANKLE 2- VIEWS LEFT   Final Result         1.  Ossific density adjacent to the medial malleolus, appearance suggests small ligaments avulsion fracture fragment.      DX-CHEST-PORTABLE (1 VIEW)   Final Result         1.  Patchy left pulmonary infiltrates, similar compared to prior study.   2.  Left rib fractures      OUTSIDE IMAGES-CT CERVICAL SPINE   Final Result      OUTSIDE IMAGES-CT CHEST/ABDOMEN/PELVIS   Final Result      DX-CHEST-LIMITED (1 VIEW)   Final Result         1.  Patchy left pulmonary infiltrates.   2.  Small apical left pneumothorax   3.  Left rib fractures      DX-PELVIS-1 OR 2 VIEWS   Final Result         1.  Left superior and inferior pubic rami fractures.   2.  Right inferior pubic rami fracture and some subtle fracture.   3.  Left sacral fracture.   4.  Widening of the left SI joint compatible with SI joint injury.      US-ABDOMEN F.A.S.T. LTD (FOR ED USE ONLY)   Final Result         1.  Small quantity of fluid adjacent to the spleen, compatible with hemorrhage            DX-HUMERUS 2+ LEFT   Final Result         1.  No acute traumatic bony injury.      CT-CHEST,ABDOMEN,PELVIS WITH   Final Result         1.  Left posterior and lateral rib fractures as described, third through eighth  rib flail segments are seen.   2.  Posterior right 10th rib fracture.   3.  Small left pneumothorax   4.  Patchy pulmonary infiltrates, likely contusion   5.  Grade 2 or 3 posterior liver laceration.   6.  Grade 3 splenic laceration with perisplenic hematoma   7.  Pelvic hemoperitoneum tracking along the left retroperitoneum and paracolic gutter. Follow-up evaluation with CT cystogram to definitively exclude bladder injury.      These findings were discussed with the patient's clinician, Nixon Kendrick, on 5/26/2022 10:53 PM.      CT-TSPINE W/O PLUS RECONS   Final Result         1.  Anterior left pneumothorax.   2.  Patchy bilateral pulmonary infiltrates, predominantly on the left   3.  Left posterior second through fifth and seventh through 11th rib fractures.   4.  Splenic laceration with hematoma.      CT-LSPINE W/O PLUS RECONS   Final Result         1.  Left L4 and L5 transverse processes fractures.   2.  Pelvic fractures, see dedicated CT of the pelvis for further characterization      OUTSIDE IMAGES-CT HEAD   Final Result      DX-PORTABLE FLUORO > 1 HOUR    (Results Pending)   DX-PELVIS-TRAUMA SERIES  3-    (Results Pending)       All current laboratory studies/radiology exams reviewed: Yes    Completed Consultations:  Dr. Manolo Han, orthopedic surgery.     Pending Consultations:  None    Newly Identified Diagnoses and Injuries:  None    RAP Score Total: 8      ETOH Screening     Assessment complete date: 5/27/2022 (BA >0.1, CAGE incomplete, reports occasional alcohol use, denies withdrawl symptoms.)        Discussed patient condition with RN, Patient and trauma surgery. Dr. Rios.
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
## 2022-05-27 NOTE — PROGRESS NOTES
Patient's significant other is on her way up and patient said his daughter is with her and is 12. He was hoping she could come in and see him. I verified age with staff and it is 15. I let him know that she will not be able to visit today.
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
## 2022-05-27 NOTE — ANESTHESIA PROCEDURE NOTES
Airway    Date/Time: 5/27/2022 9:50 AM  Performed by: Bethel Singletary M.D.  Authorized by: Bethel Singletary M.D.     Location:  OR  Urgency:  Elective  Indications for Airway Management:  Anesthesia      Spontaneous Ventilation: absent    Sedation Level:  Deep  Preoxygenated: Yes    Final Airway Type:  Supraglottic airway  Final Supraglottic Airway:  Standard LMA    SGA Size:  4  Number of Attempts at Approach:  1   LMA 4 later changed to LMA 5

## 2022-05-27 NOTE — ASSESSMENT & PLAN NOTE
Prophylactic anticoagulation for thrombotic prevention initially contraindicated secondary to elevated bleeding risk.   5/27 Cleared for prophylactic anticoagulation per ortho. Will continue to hold secondary to trend down of hemoglobin.  5/28 Trauma screening bilateral lower extremity venous duplex positive for above knee DVT, left popliteal.  5/28 IVC filter placed.  5/31 Xarelto initiated.  Will need outpatient follow up with Anticoagulation clinic upon discharge.

## 2022-05-27 NOTE — ASSESSMENT & PLAN NOTE
Patchy bilateral pulmonary infiltrates, predominantly on the left.  Supplemental oxygen to maintain SaO2 greater than 95%.  Aggressive pulmonary hygiene and serial chest radiography.

## 2022-05-27 NOTE — ASSESSMENT & PLAN NOTE
Grade 2 liver injury of the right lobe. Minimal associated hematoma.  6/1 Continued elevated liver enzymes. US pending.  6/2 Unremarkable ultrasound appearance of the liver.  Serial abdominal exams and laboratory studies stable.
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
## 2022-05-27 NOTE — ANESTHESIA POSTPROCEDURE EVALUATION
Patient: Joseph Schwab    Procedure Summary     Date: 05/27/22 Room / Location: [place] [address] / SURGERY [facility]    Anesthesia Start: 0947 Anesthesia Stop: 1152    Procedure: ORIF, PELVIS (Pelvis) Diagnosis: (Pelvic fractures)    Surgeons: Manolo Han M.D. Responsible Provider: Bethel Singletary M.D.    Anesthesia Type: general ASA Status: 2          Final Anesthesia Type: general  Last vitals  BP   Blood Pressure: 122/67    Temp   36.7 °C (98 °F)    Pulse   (!) 116   Resp   12    SpO2   93 %      Anesthesia Post Evaluation    Patient location during evaluation: PACU  Patient participation: complete - patient participated  Level of consciousness: awake and alert    Airway patency: patent  Anesthetic complications: no  Cardiovascular status: hemodynamically stable  Respiratory status: acceptable  Hydration status: euvolemic    PONV: none          No complications documented.     Nurse Pain Score: 7 (NPRS)

## 2022-05-27 NOTE — ED NOTES
Ultrasound at bedside for FAST exam.     FAST exam displayed small amount of fluid to LUQ/RUQ. Pending CT scan results per trauma surgery

## 2022-05-27 NOTE — PROGRESS NOTES
Rona from Lab called with critical result of CPK 1671 at 0124. Critical lab result read back to Rona.   Kathie Brown PA-C notified of critical lab result at 0125.  Critical lab result read back by Kathie Brown. No new orders at this time.

## 2022-05-27 NOTE — ASSESSMENT & PLAN NOTE
One unit pRBC transfused at referring facility due to transient hypotension  No further transfusion requirements upon arrival
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
## 2022-05-28 ENCOUNTER — APPOINTMENT (OUTPATIENT)
Dept: RADIOLOGY | Facility: MEDICAL CENTER | Age: 37
DRG: 957 | End: 2022-05-28
Attending: SPECIALIST
Payer: COMMERCIAL

## 2022-05-28 ENCOUNTER — APPOINTMENT (OUTPATIENT)
Dept: RADIOLOGY | Facility: MEDICAL CENTER | Age: 37
DRG: 957 | End: 2022-05-28
Attending: PHYSICIAN ASSISTANT
Payer: COMMERCIAL

## 2022-05-28 PROBLEM — I82.409 ACUTE DVT (DEEP VENOUS THROMBOSIS) (HCC): Status: ACTIVE | Noted: 2022-05-26

## 2022-05-28 LAB
ALBUMIN SERPL BCP-MCNC: 2.9 G/DL (ref 3.2–4.9)
ALBUMIN/GLOB SERPL: 1.5 G/DL
ALP SERPL-CCNC: 61 U/L (ref 30–99)
ALT SERPL-CCNC: 137 U/L (ref 2–50)
ANION GAP SERPL CALC-SCNC: 8 MMOL/L (ref 7–16)
AST SERPL-CCNC: 191 U/L (ref 12–45)
BASOPHILS # BLD AUTO: 0.3 % (ref 0–1.8)
BASOPHILS # BLD: 0.05 K/UL (ref 0–0.12)
BILIRUB SERPL-MCNC: 0.4 MG/DL (ref 0.1–1.5)
BUN SERPL-MCNC: 16 MG/DL (ref 8–22)
CALCIUM SERPL-MCNC: 7.7 MG/DL (ref 8.5–10.5)
CHLORIDE SERPL-SCNC: 106 MMOL/L (ref 96–112)
CK SERPL-CCNC: 4725 U/L (ref 0–154)
CO2 SERPL-SCNC: 25 MMOL/L (ref 20–33)
CREAT SERPL-MCNC: 0.82 MG/DL (ref 0.5–1.4)
EOSINOPHIL # BLD AUTO: 0 K/UL (ref 0–0.51)
EOSINOPHIL NFR BLD: 0 % (ref 0–6.9)
ERYTHROCYTE [DISTWIDTH] IN BLOOD BY AUTOMATED COUNT: 49 FL (ref 35.9–50)
GFR SERPLBLD CREATININE-BSD FMLA CKD-EPI: 117 ML/MIN/1.73 M 2
GLOBULIN SER CALC-MCNC: 2 G/DL (ref 1.9–3.5)
GLUCOSE BLD STRIP.AUTO-MCNC: 126 MG/DL (ref 65–99)
GLUCOSE BLD STRIP.AUTO-MCNC: 135 MG/DL (ref 65–99)
GLUCOSE BLD STRIP.AUTO-MCNC: 148 MG/DL (ref 65–99)
GLUCOSE BLD STRIP.AUTO-MCNC: 165 MG/DL (ref 65–99)
GLUCOSE SERPL-MCNC: 126 MG/DL (ref 65–99)
HCT VFR BLD AUTO: 23.5 % (ref 42–52)
HGB BLD-MCNC: 8.1 G/DL (ref 14–18)
IMM GRANULOCYTES # BLD AUTO: 0.1 K/UL (ref 0–0.11)
IMM GRANULOCYTES NFR BLD AUTO: 0.6 % (ref 0–0.9)
LYMPHOCYTES # BLD AUTO: 1.33 K/UL (ref 1–4.8)
LYMPHOCYTES NFR BLD: 7.4 % (ref 22–41)
MCH RBC QN AUTO: 31.5 PG (ref 27–33)
MCHC RBC AUTO-ENTMCNC: 34.5 G/DL (ref 33.7–35.3)
MCV RBC AUTO: 91.4 FL (ref 81.4–97.8)
MONOCYTES # BLD AUTO: 1.67 K/UL (ref 0–0.85)
MONOCYTES NFR BLD AUTO: 9.2 % (ref 0–13.4)
NEUTROPHILS # BLD AUTO: 14.93 K/UL (ref 1.82–7.42)
NEUTROPHILS NFR BLD: 82.5 % (ref 44–72)
NRBC # BLD AUTO: 0 K/UL
NRBC BLD-RTO: 0 /100 WBC
PLATELET # BLD AUTO: 101 K/UL (ref 164–446)
PMV BLD AUTO: 10.2 FL (ref 9–12.9)
POTASSIUM SERPL-SCNC: 3.6 MMOL/L (ref 3.6–5.5)
PROT SERPL-MCNC: 4.9 G/DL (ref 6–8.2)
RBC # BLD AUTO: 2.57 M/UL (ref 4.7–6.1)
SODIUM SERPL-SCNC: 139 MMOL/L (ref 135–145)
WBC # BLD AUTO: 18.1 K/UL (ref 4.8–10.8)

## 2022-05-28 PROCEDURE — 72192 CT PELVIS W/O DYE: CPT

## 2022-05-28 PROCEDURE — 99232 SBSQ HOSP IP/OBS MODERATE 35: CPT | Performed by: SURGERY

## 2022-05-28 PROCEDURE — 93970 EXTREMITY STUDY: CPT | Mod: 26 | Performed by: INTERNAL MEDICINE

## 2022-05-28 PROCEDURE — 770001 HCHG ROOM/CARE - MED/SURG/GYN PRIV*

## 2022-05-28 PROCEDURE — A9270 NON-COVERED ITEM OR SERVICE: HCPCS | Performed by: SURGERY

## 2022-05-28 PROCEDURE — 85025 COMPLETE CBC W/AUTO DIFF WBC: CPT

## 2022-05-28 PROCEDURE — 700117 HCHG RX CONTRAST REV CODE 255: Performed by: PHYSICIAN ASSISTANT

## 2022-05-28 PROCEDURE — 94669 MECHANICAL CHEST WALL OSCILL: CPT

## 2022-05-28 PROCEDURE — 93970 EXTREMITY STUDY: CPT

## 2022-05-28 PROCEDURE — A9270 NON-COVERED ITEM OR SERVICE: HCPCS | Performed by: SPECIALIST

## 2022-05-28 PROCEDURE — 700111 HCHG RX REV CODE 636 W/ 250 OVERRIDE (IP): Performed by: ORTHOPAEDIC SURGERY

## 2022-05-28 PROCEDURE — 700105 HCHG RX REV CODE 258: Performed by: SPECIALIST

## 2022-05-28 PROCEDURE — 82962 GLUCOSE BLOOD TEST: CPT | Mod: 91

## 2022-05-28 PROCEDURE — 80053 COMPREHEN METABOLIC PANEL: CPT

## 2022-05-28 PROCEDURE — 82550 ASSAY OF CK (CPK): CPT

## 2022-05-28 PROCEDURE — 700102 HCHG RX REV CODE 250 W/ 637 OVERRIDE(OP): Performed by: SPECIALIST

## 2022-05-28 PROCEDURE — 700101 HCHG RX REV CODE 250: Performed by: SPECIALIST

## 2022-05-28 PROCEDURE — 700102 HCHG RX REV CODE 250 W/ 637 OVERRIDE(OP): Performed by: SURGERY

## 2022-05-28 PROCEDURE — 71045 X-RAY EXAM CHEST 1 VIEW: CPT

## 2022-05-28 RX ORDER — POTASSIUM CHLORIDE 20 MEQ/1
40 TABLET, EXTENDED RELEASE ORAL ONCE
Status: COMPLETED | OUTPATIENT
Start: 2022-05-28 | End: 2022-05-28

## 2022-05-28 RX ADMIN — Medication 1 EACH: at 17:01

## 2022-05-28 RX ADMIN — LIDOCAINE 1 PATCH: 50 PATCH TOPICAL at 06:10

## 2022-05-28 RX ADMIN — DOCUSATE SODIUM 100 MG: 100 CAPSULE, LIQUID FILLED ORAL at 16:58

## 2022-05-28 RX ADMIN — POLYETHYLENE GLYCOL 3350 1 PACKET: 17 POWDER, FOR SOLUTION ORAL at 06:03

## 2022-05-28 RX ADMIN — GABAPENTIN 100 MG: 100 CAPSULE ORAL at 21:09

## 2022-05-28 RX ADMIN — POLYETHYLENE GLYCOL 3350 1 PACKET: 17 POWDER, FOR SOLUTION ORAL at 17:01

## 2022-05-28 RX ADMIN — CEFAZOLIN SODIUM 2 G: 2 INJECTION, SOLUTION INTRAVENOUS at 01:50

## 2022-05-28 RX ADMIN — SODIUM CHLORIDE, POTASSIUM CHLORIDE, SODIUM LACTATE AND CALCIUM CHLORIDE: 600; 310; 30; 20 INJECTION, SOLUTION INTRAVENOUS at 23:57

## 2022-05-28 RX ADMIN — GABAPENTIN 100 MG: 100 CAPSULE ORAL at 06:01

## 2022-05-28 RX ADMIN — POTASSIUM CHLORIDE 40 MEQ: 20 TABLET, EXTENDED RELEASE ORAL at 12:33

## 2022-05-28 RX ADMIN — METAXALONE 800 MG: 800 TABLET ORAL at 06:01

## 2022-05-28 RX ADMIN — DOCUSATE SODIUM 100 MG: 100 CAPSULE, LIQUID FILLED ORAL at 06:12

## 2022-05-28 RX ADMIN — IOHEXOL 25 ML: 300 INJECTION, SOLUTION INTRAVENOUS at 15:34

## 2022-05-28 RX ADMIN — OXYCODONE HYDROCHLORIDE 10 MG: 10 TABLET ORAL at 16:58

## 2022-05-28 RX ADMIN — ACETAMINOPHEN 650 MG: 325 TABLET ORAL at 23:57

## 2022-05-28 RX ADMIN — OXYCODONE 5 MG: 5 TABLET ORAL at 21:09

## 2022-05-28 RX ADMIN — ACETAMINOPHEN 650 MG: 325 TABLET ORAL at 16:58

## 2022-05-28 RX ADMIN — ACETAMINOPHEN 650 MG: 325 TABLET ORAL at 06:02

## 2022-05-28 RX ADMIN — SENNOSIDES AND DOCUSATE SODIUM 1 TABLET: 50; 8.6 TABLET ORAL at 21:09

## 2022-05-28 RX ADMIN — Medication 1 EACH: at 12:32

## 2022-05-28 RX ADMIN — VENLAFAXINE HYDROCHLORIDE 75 MG: 75 CAPSULE, EXTENDED RELEASE ORAL at 06:03

## 2022-05-28 RX ADMIN — OXYCODONE 5 MG: 5 TABLET ORAL at 04:35

## 2022-05-28 RX ADMIN — ACETAMINOPHEN 650 MG: 325 TABLET ORAL at 12:32

## 2022-05-28 RX ADMIN — ACETAMINOPHEN 650 MG: 325 TABLET ORAL at 01:52

## 2022-05-28 RX ADMIN — OXYCODONE 5 MG: 5 TABLET ORAL at 09:35

## 2022-05-28 RX ADMIN — METAXALONE 800 MG: 800 TABLET ORAL at 12:32

## 2022-05-28 RX ADMIN — OXYCODONE 5 MG: 5 TABLET ORAL at 12:32

## 2022-05-28 RX ADMIN — METAXALONE 800 MG: 800 TABLET ORAL at 17:01

## 2022-05-28 RX ADMIN — Medication 1 EACH: at 06:02

## 2022-05-28 ASSESSMENT — LIFESTYLE VARIABLES
TOTAL SCORE: 0
CONSUMPTION TOTAL: NEGATIVE
AVERAGE NUMBER OF DAYS PER WEEK YOU HAVE A DRINK CONTAINING ALCOHOL: 2
HAVE PEOPLE ANNOYED YOU BY CRITICIZING YOUR DRINKING: NO
EVER HAD A DRINK FIRST THING IN THE MORNING TO STEADY YOUR NERVES TO GET RID OF A HANGOVER: NO
ON A TYPICAL DAY WHEN YOU DRINK ALCOHOL HOW MANY DRINKS DO YOU HAVE: 1
DOES PATIENT WANT TO STOP DRINKING: NO
TOTAL SCORE: 0
TOTAL SCORE: 0
HAVE YOU EVER FELT YOU SHOULD CUT DOWN ON YOUR DRINKING: NO
HOW MANY TIMES IN THE PAST YEAR HAVE YOU HAD 5 OR MORE DRINKS IN A DAY: 0
ALCOHOL_USE: YES
EVER FELT BAD OR GUILTY ABOUT YOUR DRINKING: NO

## 2022-05-28 ASSESSMENT — COGNITIVE AND FUNCTIONAL STATUS - GENERAL
DRESSING REGULAR LOWER BODY CLOTHING: A LITTLE
CLIMB 3 TO 5 STEPS WITH RAILING: A LITTLE
MOVING FROM LYING ON BACK TO SITTING ON SIDE OF FLAT BED: A LITTLE
STANDING UP FROM CHAIR USING ARMS: A LITTLE
TURNING FROM BACK TO SIDE WHILE IN FLAT BAD: A LITTLE
DRESSING REGULAR UPPER BODY CLOTHING: A LITTLE
MOBILITY SCORE: 18
SUGGESTED CMS G CODE MODIFIER DAILY ACTIVITY: CJ
MOVING TO AND FROM BED TO CHAIR: A LITTLE
HELP NEEDED FOR BATHING: A LITTLE
DAILY ACTIVITIY SCORE: 21
SUGGESTED CMS G CODE MODIFIER MOBILITY: CK
WALKING IN HOSPITAL ROOM: A LITTLE

## 2022-05-28 ASSESSMENT — PAIN DESCRIPTION - PAIN TYPE
TYPE: ACUTE PAIN
TYPE: ACUTE PAIN;SURGICAL PAIN
TYPE: ACUTE PAIN
TYPE: ACUTE PAIN

## 2022-05-28 ASSESSMENT — FIBROSIS 4 INDEX: FIB4 SCORE: 4.04

## 2022-05-28 NOTE — CARE PLAN
The patient is Stable - Low risk of patient condition declining or worsening    Shift Goals  Clinical Goals: Rest, pain control, monitor VS  Patient Goals: Restful sleep, pain control  Family Goals: Stay up to date    Progress made toward(s) clinical / shift goals: increasing mobility up to the chair     Patient is not progressing towards the following goals:    Problem: Knowledge Deficit - Standard  Goal: Patient and family/care givers will demonstrate understanding of plan of care, disease process/condition, diagnostic tests and medications  Outcome: Progressing  Note: Milagro DURANT and PT/OT ordered, pt aware and has no additional questions at this time.      Problem: Pain - Standard  Goal: Alleviation of pain or a reduction in pain to the patient’s comfort goal  Outcome: Progressing  Note: PRN pain medication given for pain management, see MAR. Pain currently managed with orals. RN will continue to monitor.
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
## 2022-05-28 NOTE — PROGRESS NOTES
Pt FABIAN to CT via hospital bed. After CT pt to be transferred to T410. Pt wife took all pts belongings and went to T410.
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
## 2022-05-28 NOTE — CARE PLAN
The patient is Watcher - Medium risk of patient condition declining or worsening    Shift Goals  Clinical Goals: Rest, pain control, monitor VS  Patient Goals: Restful sleep, pain control  Family Goals: DEANN    Progress made toward(s) clinical / shift goals:   Problem: Knowledge Deficit - Standard  Goal: Patient and family/care givers will demonstrate understanding of plan of care, disease process/condition, diagnostic tests and medications  Outcome: Progressing     Problem: Pain - Standard  Goal: Alleviation of pain or a reduction in pain to the patient’s comfort goal  Outcome: Progressing     Problem: Skin Integrity  Goal: Skin integrity is maintained or improved  Outcome: Progressing

## 2022-05-28 NOTE — PROGRESS NOTES
Attempted to call report to Sol RN [facility]. RN currently in another pt room and will call back when able. Pt to be transferred to [facility].

## 2022-05-28 NOTE — PROGRESS NOTES
4 Eyes Skin Assessment Completed by BARRY Connolly and Sol, Nurse Apprentice.    Head WDL- Legally blind in left eye  Ears WDL  Nose WDL  Mouth WDL  Neck WDL  Breast/Chest Abrasion and Bruising  Shoulder Blades WDL  Spine WDL  (R) Arm/Elbow/Hand Bruising and Abrasion  (L) Arm/Elbow/Hand Bruising and Abrasion  Abdomen- Distended & hard   Groin - Ray in place  Scrotum/Coccyx/Buttocks WDL  (R) Leg Bruising and Abrasion  (L) Leg Bruising and Abrasion  (R) Heel/Foot/Toe Bruising and Edema, scattered bruising and abrasions, denies numbness or tingling.  (L) Heel/Foot/Toe Bruising and Edema, scattered bruising and abrasions, denies numbness or tingling.          Devices In Places Pulse Ox, Ray and Nasal Cannula      Interventions In Place NC W/Ear Foams, Pillows and Pressure Redistribution Mattress    Possible Skin Injury No    Pictures Uploaded Into Epic N/A  Wound Consult Placed N/A  RN Wound Prevention Protocol Ordered No     Patient admitted to room T410 via transport in bed from SICU at 1600.      Patient reports pain at 7 on a scale of 0-10. Educated patient regarding pharmacologic and non pharmacologic modalities for pain management. Oriented to room call light and smoking policy.  Reviewed plan of care (equipment, incentive spirometer, sequential compression devices, medications, activity, diet, fall precautions, skin care, and pain) with patient and family.  All questions answered. Education provided on oral hygiene program.    AA&Ox4. Denies CP/SOB.  See 2 RN skin note above.  Tolerating Regular diet. Denies N/V.  + void via ray. Last BM PTA.  Pt ambulates with x1 assist and FWW.  All needs met at this time. Call light within reach. Pt calls appropriately. Bed low and locked, non skid socks in place. Hourly rounding in place.

## 2022-05-29 ENCOUNTER — APPOINTMENT (OUTPATIENT)
Dept: RADIOLOGY | Facility: MEDICAL CENTER | Age: 37
DRG: 957 | End: 2022-05-29
Attending: SPECIALIST
Payer: COMMERCIAL

## 2022-05-29 ENCOUNTER — APPOINTMENT (OUTPATIENT)
Dept: RADIOLOGY | Facility: MEDICAL CENTER | Age: 37
DRG: 957 | End: 2022-05-29
Attending: NURSE PRACTITIONER
Payer: COMMERCIAL

## 2022-05-29 PROBLEM — D62 ACUTE BLOOD LOSS ANEMIA: Status: ACTIVE | Noted: 2022-05-29

## 2022-05-29 PROBLEM — Z78.9 ALCOHOL USE: Status: RESOLVED | Noted: 2022-05-27 | Resolved: 2022-05-29

## 2022-05-29 PROBLEM — I82.432 ACUTE DEEP VEIN THROMBOSIS (DVT) OF POPLITEAL VEIN OF LEFT LOWER EXTREMITY (HCC): Status: ACTIVE | Noted: 2022-05-26

## 2022-05-29 PROBLEM — Z02.9 DISCHARGE PLANNING ISSUES: Status: ACTIVE | Noted: 2022-05-29

## 2022-05-29 PROBLEM — Z11.52 ENCOUNTER FOR SCREENING FOR COVID-19: Status: RESOLVED | Noted: 2022-05-26 | Resolved: 2022-05-29

## 2022-05-29 LAB
ALBUMIN SERPL BCP-MCNC: 3.2 G/DL (ref 3.2–4.9)
ALBUMIN/GLOB SERPL: 1.7 G/DL
ALP SERPL-CCNC: 86 U/L (ref 30–99)
ALT SERPL-CCNC: 103 U/L (ref 2–50)
ANION GAP SERPL CALC-SCNC: 7 MMOL/L (ref 7–16)
AST SERPL-CCNC: 173 U/L (ref 12–45)
BASOPHILS # BLD AUTO: 0.2 % (ref 0–1.8)
BASOPHILS # BLD: 0.02 K/UL (ref 0–0.12)
BILIRUB SERPL-MCNC: 0.7 MG/DL (ref 0.1–1.5)
BUN SERPL-MCNC: 11 MG/DL (ref 8–22)
CALCIUM SERPL-MCNC: 8.3 MG/DL (ref 8.5–10.5)
CHLORIDE SERPL-SCNC: 108 MMOL/L (ref 96–112)
CK SERPL-CCNC: 4350 U/L (ref 0–154)
CO2 SERPL-SCNC: 28 MMOL/L (ref 20–33)
CREAT SERPL-MCNC: 0.63 MG/DL (ref 0.5–1.4)
EOSINOPHIL # BLD AUTO: 0.1 K/UL (ref 0–0.51)
EOSINOPHIL NFR BLD: 0.8 % (ref 0–6.9)
ERYTHROCYTE [DISTWIDTH] IN BLOOD BY AUTOMATED COUNT: 47.8 FL (ref 35.9–50)
GFR SERPLBLD CREATININE-BSD FMLA CKD-EPI: 127 ML/MIN/1.73 M 2
GLOBULIN SER CALC-MCNC: 1.9 G/DL (ref 1.9–3.5)
GLUCOSE BLD STRIP.AUTO-MCNC: 101 MG/DL (ref 65–99)
GLUCOSE SERPL-MCNC: 92 MG/DL (ref 65–99)
HCT VFR BLD AUTO: 21.2 % (ref 42–52)
HGB BLD-MCNC: 7.1 G/DL (ref 14–18)
IMM GRANULOCYTES # BLD AUTO: 0.07 K/UL (ref 0–0.11)
IMM GRANULOCYTES NFR BLD AUTO: 0.6 % (ref 0–0.9)
LYMPHOCYTES # BLD AUTO: 1.86 K/UL (ref 1–4.8)
LYMPHOCYTES NFR BLD: 15.2 % (ref 22–41)
MCH RBC QN AUTO: 31.3 PG (ref 27–33)
MCHC RBC AUTO-ENTMCNC: 33.5 G/DL (ref 33.7–35.3)
MCV RBC AUTO: 93.4 FL (ref 81.4–97.8)
MONOCYTES # BLD AUTO: 1 K/UL (ref 0–0.85)
MONOCYTES NFR BLD AUTO: 8.2 % (ref 0–13.4)
NEUTROPHILS # BLD AUTO: 9.21 K/UL (ref 1.82–7.42)
NEUTROPHILS NFR BLD: 75 % (ref 44–72)
NRBC # BLD AUTO: 0 K/UL
NRBC BLD-RTO: 0 /100 WBC
PLATELET # BLD AUTO: 103 K/UL (ref 164–446)
PMV BLD AUTO: 10.3 FL (ref 9–12.9)
POTASSIUM SERPL-SCNC: 3.7 MMOL/L (ref 3.6–5.5)
PROT SERPL-MCNC: 5.1 G/DL (ref 6–8.2)
RBC # BLD AUTO: 2.27 M/UL (ref 4.7–6.1)
SODIUM SERPL-SCNC: 143 MMOL/L (ref 135–145)
WBC # BLD AUTO: 12.3 K/UL (ref 4.8–10.8)

## 2022-05-29 PROCEDURE — 06H03DZ INSERTION OF INTRALUMINAL DEVICE INTO INFERIOR VENA CAVA, PERCUTANEOUS APPROACH: ICD-10-PCS | Performed by: RADIOLOGY

## 2022-05-29 PROCEDURE — 80053 COMPREHEN METABOLIC PANEL: CPT

## 2022-05-29 PROCEDURE — 94669 MECHANICAL CHEST WALL OSCILL: CPT

## 2022-05-29 PROCEDURE — 85025 COMPLETE CBC W/AUTO DIFF WBC: CPT

## 2022-05-29 PROCEDURE — 700111 HCHG RX REV CODE 636 W/ 250 OVERRIDE (IP)

## 2022-05-29 PROCEDURE — 700105 HCHG RX REV CODE 258: Performed by: SPECIALIST

## 2022-05-29 PROCEDURE — 82550 ASSAY OF CK (CPK): CPT

## 2022-05-29 PROCEDURE — 700102 HCHG RX REV CODE 250 W/ 637 OVERRIDE(OP): Performed by: NURSE PRACTITIONER

## 2022-05-29 PROCEDURE — 99232 SBSQ HOSP IP/OBS MODERATE 35: CPT | Performed by: SURGERY

## 2022-05-29 PROCEDURE — A9270 NON-COVERED ITEM OR SERVICE: HCPCS | Performed by: SPECIALIST

## 2022-05-29 PROCEDURE — 71045 X-RAY EXAM CHEST 1 VIEW: CPT

## 2022-05-29 PROCEDURE — 700102 HCHG RX REV CODE 250 W/ 637 OVERRIDE(OP): Performed by: SPECIALIST

## 2022-05-29 PROCEDURE — 37191 INS ENDOVAS VENA CAVA FILTR: CPT

## 2022-05-29 PROCEDURE — 700101 HCHG RX REV CODE 250: Performed by: SPECIALIST

## 2022-05-29 PROCEDURE — 700111 HCHG RX REV CODE 636 W/ 250 OVERRIDE (IP): Performed by: RADIOLOGY

## 2022-05-29 PROCEDURE — 700117 HCHG RX CONTRAST REV CODE 255: Performed by: NURSE PRACTITIONER

## 2022-05-29 PROCEDURE — 770001 HCHG ROOM/CARE - MED/SURG/GYN PRIV*

## 2022-05-29 PROCEDURE — 99233 SBSQ HOSP IP/OBS HIGH 50: CPT | Performed by: NURSE PRACTITIONER

## 2022-05-29 PROCEDURE — 82962 GLUCOSE BLOOD TEST: CPT

## 2022-05-29 PROCEDURE — A9270 NON-COVERED ITEM OR SERVICE: HCPCS | Performed by: NURSE PRACTITIONER

## 2022-05-29 PROCEDURE — 36415 COLL VENOUS BLD VENIPUNCTURE: CPT

## 2022-05-29 RX ORDER — CALCIUM CARBONATE 500 MG/1
500-1000 TABLET, CHEWABLE ORAL 3 TIMES DAILY PRN
Status: DISCONTINUED | OUTPATIENT
Start: 2022-05-29 | End: 2022-06-07 | Stop reason: HOSPADM

## 2022-05-29 RX ORDER — SODIUM CHLORIDE 9 MG/ML
500 INJECTION, SOLUTION INTRAVENOUS
Status: ACTIVE | OUTPATIENT
Start: 2022-05-29 | End: 2022-05-29

## 2022-05-29 RX ORDER — ONDANSETRON 2 MG/ML
4 INJECTION INTRAMUSCULAR; INTRAVENOUS EVERY 6 HOURS PRN
Status: ACTIVE | OUTPATIENT
Start: 2022-05-29 | End: 2022-05-29

## 2022-05-29 RX ORDER — MIDAZOLAM HYDROCHLORIDE 1 MG/ML
.5-2 INJECTION INTRAMUSCULAR; INTRAVENOUS PRN
Status: ACTIVE | OUTPATIENT
Start: 2022-05-29 | End: 2022-05-29

## 2022-05-29 RX ORDER — MIDAZOLAM HYDROCHLORIDE 1 MG/ML
INJECTION INTRAMUSCULAR; INTRAVENOUS
Status: COMPLETED
Start: 2022-05-29 | End: 2022-05-29

## 2022-05-29 RX ORDER — OXYCODONE HYDROCHLORIDE 5 MG/1
5 TABLET ORAL EVERY 4 HOURS PRN
Status: DISCONTINUED | OUTPATIENT
Start: 2022-05-29 | End: 2022-05-31

## 2022-05-29 RX ORDER — OXYCODONE HYDROCHLORIDE 10 MG/1
10 TABLET ORAL EVERY 4 HOURS PRN
Status: DISCONTINUED | OUTPATIENT
Start: 2022-05-29 | End: 2022-05-31

## 2022-05-29 RX ADMIN — Medication 1 EACH: at 16:50

## 2022-05-29 RX ADMIN — POLYETHYLENE GLYCOL 3350 1 PACKET: 17 POWDER, FOR SOLUTION ORAL at 16:51

## 2022-05-29 RX ADMIN — METAXALONE 800 MG: 800 TABLET ORAL at 13:24

## 2022-05-29 RX ADMIN — FENTANYL CITRATE 50 MCG: 50 INJECTION, SOLUTION INTRAMUSCULAR; INTRAVENOUS at 08:38

## 2022-05-29 RX ADMIN — MIDAZOLAM HYDROCHLORIDE 1 MG: 1 INJECTION, SOLUTION INTRAMUSCULAR; INTRAVENOUS at 08:38

## 2022-05-29 RX ADMIN — DOCUSATE SODIUM 100 MG: 100 CAPSULE, LIQUID FILLED ORAL at 16:51

## 2022-05-29 RX ADMIN — SENNOSIDES AND DOCUSATE SODIUM 1 TABLET: 50; 8.6 TABLET ORAL at 21:07

## 2022-05-29 RX ADMIN — GABAPENTIN 100 MG: 100 CAPSULE ORAL at 21:07

## 2022-05-29 RX ADMIN — METAXALONE 800 MG: 800 TABLET ORAL at 16:50

## 2022-05-29 RX ADMIN — LIDOCAINE 1 PATCH: 50 PATCH TOPICAL at 06:15

## 2022-05-29 RX ADMIN — IOHEXOL 30 ML: 300 INJECTION, SOLUTION INTRAVENOUS at 09:30

## 2022-05-29 RX ADMIN — SODIUM CHLORIDE, POTASSIUM CHLORIDE, SODIUM LACTATE AND CALCIUM CHLORIDE: 600; 310; 30; 20 INJECTION, SOLUTION INTRAVENOUS at 09:25

## 2022-05-29 RX ADMIN — Medication 1 EACH: at 06:16

## 2022-05-29 RX ADMIN — ACETAMINOPHEN 650 MG: 325 TABLET ORAL at 16:50

## 2022-05-29 RX ADMIN — OXYCODONE HYDROCHLORIDE 10 MG: 10 TABLET ORAL at 10:23

## 2022-05-29 RX ADMIN — MIDAZOLAM HYDROCHLORIDE 0.5 MG: 1 INJECTION, SOLUTION INTRAMUSCULAR; INTRAVENOUS at 08:47

## 2022-05-29 RX ADMIN — ACETAMINOPHEN 650 MG: 325 TABLET ORAL at 06:16

## 2022-05-29 RX ADMIN — SENNOSIDES AND DOCUSATE SODIUM 1 TABLET: 50; 8.6 TABLET ORAL at 10:23

## 2022-05-29 RX ADMIN — VENLAFAXINE HYDROCHLORIDE 75 MG: 75 CAPSULE, EXTENDED RELEASE ORAL at 06:16

## 2022-05-29 RX ADMIN — Medication 1 EACH: at 13:24

## 2022-05-29 RX ADMIN — METAXALONE 800 MG: 800 TABLET ORAL at 06:16

## 2022-05-29 RX ADMIN — GABAPENTIN 100 MG: 100 CAPSULE ORAL at 13:24

## 2022-05-29 RX ADMIN — OXYCODONE HYDROCHLORIDE 10 MG: 10 TABLET ORAL at 03:00

## 2022-05-29 RX ADMIN — ACETAMINOPHEN 650 MG: 325 TABLET ORAL at 13:24

## 2022-05-29 RX ADMIN — SODIUM CHLORIDE, POTASSIUM CHLORIDE, SODIUM LACTATE AND CALCIUM CHLORIDE: 600; 310; 30; 20 INJECTION, SOLUTION INTRAVENOUS at 21:07

## 2022-05-29 RX ADMIN — GABAPENTIN 100 MG: 100 CAPSULE ORAL at 06:16

## 2022-05-29 RX ADMIN — FENTANYL CITRATE 25 MCG: 50 INJECTION, SOLUTION INTRAMUSCULAR; INTRAVENOUS at 08:47

## 2022-05-29 RX ADMIN — OXYCODONE HYDROCHLORIDE 10 MG: 10 TABLET ORAL at 16:50

## 2022-05-29 RX ADMIN — DOCUSATE SODIUM 100 MG: 100 CAPSULE, LIQUID FILLED ORAL at 06:16

## 2022-05-29 ASSESSMENT — ENCOUNTER SYMPTOMS
NAUSEA: 0
DOUBLE VISION: 0
SHORTNESS OF BREATH: 0
SPEECH CHANGE: 0
VOMITING: 0
MYALGIAS: 1
NECK PAIN: 0
FOCAL WEAKNESS: 0
CHILLS: 0
CONSTIPATION: 1
BLURRED VISION: 0
BACK PAIN: 0
DIZZINESS: 0
FEVER: 0
TINGLING: 0
ABDOMINAL PAIN: 1
HEADACHES: 0
SENSORY CHANGE: 0

## 2022-05-29 ASSESSMENT — PAIN DESCRIPTION - PAIN TYPE
TYPE: ACUTE PAIN
TYPE: ACUTE PAIN
TYPE: ACUTE PAIN;SURGICAL PAIN
TYPE: ACUTE PAIN
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
## 2022-05-29 NOTE — CARE PLAN
The patient is Stable - Low risk of patient condition declining or worsening    Shift Goals  Clinical Goals: Pulse checks, monitor L leg  Patient Goals: Pain control, eat  Family Goals: Stay up to date    Progress made toward(s) clinical / shift goals:      Problem: Knowledge Deficit - Standard  Goal: Patient and family/care givers will demonstrate understanding of plan of care, disease process/condition, diagnostic tests and medications  Outcome: Progressing   Educated patient on plan of care, patient condition, and medications. All questions addressed.     Problem: Pain - Standard  Goal: Alleviation of pain or a reduction in pain to the patient’s comfort goal  Outcome: Progressing   Pain medication administered PRN. Pain controlled after administration.

## 2022-05-29 NOTE — PROGRESS NOTES
"   Orthopaedic PA Progress Note    Interval changes:Now on floor. No new complaints.    ROS - Patient denies any new issues. No chest pain, dyspnea, or fever.  Pain well controlled.    BP (!) 145/99   Pulse (!) 109   Temp 36.2 °C (97.2 °F) (Temporal)   Resp 18   Ht 1.905 m (6' 3\")   Wt 76.8 kg (169 lb 5 oz)   SpO2 93%     Patient seen and examined  No acute distress  Breathing non labored  RRR  Surgical dressing is clean, dry, and intact. Patient clearly fires tibialis anterior, EHL, and gastrocnemius/soleus. Sensation is intact to light touch throughout superficial peroneal, deep peroneal, tibial, saphenous, and sural nerve distributions. Strong and palpable 2+ dorsalis pedis and posterior tibial pulses with capillary refill less than 2 seconds. No lower leg tenderness or discomfort.    Recent Labs     05/27/22  0419 05/27/22  1520 05/28/22  0440 05/29/22  0456   WBC 40.3*  --  18.1* 12.3*   RBC 4.11*  --  2.57* 2.27*   HEMOGLOBIN 13.0* 10.3* 8.1* 7.1*   HEMATOCRIT 37.2* 30.0* 23.5* 21.2*   MCV 90.5  --  91.4 93.4   MCH 31.6  --  31.5 31.3   MCHC 34.9  --  34.5 33.5*   RDW 47.6  --  49.0 47.8   PLATELETCT 177  --  101* 103*   MPV 9.3  --  10.2 10.3       Active Hospital Problems    Diagnosis    • Closed fracture of multiple ribs of both sides [S22.43XA]      Priority: High   • Contusion of both lungs [S27.322A]      Priority: High   • Acute deep vein thrombosis (DVT) of popliteal vein of left lower extremity (HCC) [I82.432]      Priority: High   • Closed bilateral fracture of pubic rami (HCC) [S32.591A, S32.592A]      Priority: High   • Splenic laceration, initial encounter [S36.039A]      Priority: High   • Liver laceration, initial encounter [S36.113A]      Priority: High   • Acute blood loss anemia [D62]      Priority: Medium   • Ankle fracture, left, closed, initial encounter [S82.982A]      Priority: Medium   • Leukocytosis [D72.829]      Priority: Medium   • Closed fracture of transverse process of " lumbar vertebra (HCC) [S32.009A]      Priority: Medium   • Discharge planning issues [Z02.9]      Priority: Low   • Trauma [T14.90XA]      Priority: Low   • Pneumothorax on left [J93.9]      Priority: Low   • Anxiety [F41.9]      Priority: Low       Assessment/Plan:  POD#2 S/P  1.  Percutaneous skeletal fixation of left sacroiliac joint fracture dislocation through first sacral segment.   2.  PSF L SI joint fracture dislocation through second sacral segment.  3.  PSF left and right anterior pelvic fractures.    TTWB RLE and may weightbear for wheelchair transfers only to the RLE for 6-8 Weeks.  PT/OT for wheelchair transfer training.  Ancef - completed  LMWH/SCDs   Disposition Follow-Up: needs appointment with Dr. Han at [facility] at 10-14 days post-op for re-evaluation, staple removal and radiographs. Clear for disposition (home/SNF/IRF) from Orthopaedic team standpoint.    Weightbearing recs as follows: TTWB LLE and WB RLE for transfers only

## 2022-05-29 NOTE — ASSESSMENT & PLAN NOTE
5/30 Iron studies completed, IV replacement not indicated.  Continue to trend closely.  Transfuse 1 unit PRBC's for hemoglobin less than 7.

## 2022-05-29 NOTE — ASSESSMENT & PLAN NOTE
Date of admission: 5/26/2022.  5/28 Transfer orders from SICU.  5/29 Rehab referral.  Cleared for discharge: Yes - Date: 5/31.  Discharge delayed: No.  Discharge date: tbd.

## 2022-05-29 NOTE — PROGRESS NOTES
Lower extremity ultrasound DVT positive for right popliteal DVT  Discussed with Dr. Rios, unable to anticoagulate due to solid organ injuries  Order placed for IVC filter

## 2022-05-29 NOTE — OR SURGEON
Immediate Post- Operative Note        Findings: normal cava      Procedure(s): cavagram, ivc filter      Estimated Blood Loss: Less than 5 ml        Complications: arterial puncture at start, treated with compression. Not dilated.            5/29/2022     9:04 AM     Artem Baxter M.D.

## 2022-05-29 NOTE — PROGRESS NOTES
Report received from randee RN, assumed care at 1900.  Assessment complete.  A&O x 4. Patient calls appropriately.  Patient ambulates with x1 assist with FWW.  Patient has 5/10 pain. Pain managed with prescribed medications.  Denies N&V. Tolerating regular diet. NPO at midnight  Surgical bilateral hip sites, gauze and tegaderm, CD&I.  + void via ray catheter, - flatus, - BM.  Patient denies SOB. On 2L NC  Patient calm, pleasant, and cooperative.  Review plan with of care with patient. Call light and personal belongings within reach. Hourly rounding in place. All needs met at this time.

## 2022-05-29 NOTE — PROGRESS NOTES
Patient brought to IR for IVC Filter. Patient AAOx4, respirations even and unlabored. Dr. Baxter at bedside with consent. Patient assisted to bed and attached to NiBP, O2, SpO2, Co2, and EKG.     0838 1 mg of Versed and 50 mcg of Fentanyl given IVP     0846 Time out done for procedure to begin    0847 0.5 mg of Versed and 25 mcg of Fentanyl given IVP     0855 IVC placed.    0857 Called cheryl Trevizo back to room. Patient transport with RN at bedside.     0900 Hemostasis to RIGHT groin. Patient to lay flat for 2 hours. Patient can sit up at 1100.    ARGON--[employer]--Retrievable Vena Cava Filter  LOT: [id number]  REF: [id number]  EXP: 05.05.2025
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
## 2022-05-29 NOTE — THERAPY
Missed Therapy     Patient Name: Joseph Schwab  Age:  36 y.o., Sex:  male  Medical Record #: [id number]  Today's Date: 5/29/2022    PT eval order received, chart reviewed. Pt is s/p IVC filter this morning per RN and is to lay flat x 2 hours, currently on bedrest. Will f/u.     Vicenta lousi, PT,DPT

## 2022-05-30 ENCOUNTER — APPOINTMENT (OUTPATIENT)
Dept: RADIOLOGY | Facility: MEDICAL CENTER | Age: 37
DRG: 957 | End: 2022-05-30
Attending: ORTHOPAEDIC SURGERY
Payer: COMMERCIAL

## 2022-05-30 ENCOUNTER — APPOINTMENT (OUTPATIENT)
Dept: RADIOLOGY | Facility: MEDICAL CENTER | Age: 37
DRG: 957 | End: 2022-05-30
Attending: NURSE PRACTITIONER
Payer: COMMERCIAL

## 2022-05-30 PROBLEM — I10 PRIMARY HYPERTENSION: Status: ACTIVE | Noted: 2022-05-30

## 2022-05-30 PROBLEM — J93.9 PNEUMOTHORAX ON LEFT: Status: RESOLVED | Noted: 2022-05-26 | Resolved: 2022-05-30

## 2022-05-30 LAB
ABO GROUP BLD: ABNORMAL
ALBUMIN SERPL BCP-MCNC: 3.3 G/DL (ref 3.2–4.9)
ALBUMIN/GLOB SERPL: 1.4 G/DL
ALP SERPL-CCNC: 118 U/L (ref 30–99)
ALT SERPL-CCNC: 100 U/L (ref 2–50)
ANION GAP SERPL CALC-SCNC: 11 MMOL/L (ref 7–16)
AST SERPL-CCNC: 155 U/L (ref 12–45)
BASOPHILS # BLD AUTO: 0.2 % (ref 0–1.8)
BASOPHILS # BLD: 0.02 K/UL (ref 0–0.12)
BILIRUB SERPL-MCNC: 1.1 MG/DL (ref 0.1–1.5)
BLD GP AB SCN SERPL QL: ABNORMAL
BUN SERPL-MCNC: 8 MG/DL (ref 8–22)
CALCIUM SERPL-MCNC: 8.6 MG/DL (ref 8.5–10.5)
CHLORIDE SERPL-SCNC: 102 MMOL/L (ref 96–112)
CK SERPL-CCNC: 3668 U/L (ref 0–154)
CO2 SERPL-SCNC: 27 MMOL/L (ref 20–33)
CREAT SERPL-MCNC: 0.5 MG/DL (ref 0.5–1.4)
EOSINOPHIL # BLD AUTO: 0.12 K/UL (ref 0–0.51)
EOSINOPHIL NFR BLD: 1 % (ref 0–6.9)
ERYTHROCYTE [DISTWIDTH] IN BLOOD BY AUTOMATED COUNT: 45.5 FL (ref 35.9–50)
FERRITIN SERPL-MCNC: 422 NG/ML (ref 22–322)
GFR SERPLBLD CREATININE-BSD FMLA CKD-EPI: 136 ML/MIN/1.73 M 2
GLOBULIN SER CALC-MCNC: 2.3 G/DL (ref 1.9–3.5)
GLUCOSE BLD STRIP.AUTO-MCNC: 96 MG/DL (ref 65–99)
GLUCOSE SERPL-MCNC: 109 MG/DL (ref 65–99)
HCT VFR BLD AUTO: 21.4 % (ref 42–52)
HGB BLD-MCNC: 7.4 G/DL (ref 14–18)
HGB RETIC QN AUTO: 34.8 PG/CELL (ref 29–35)
IMM GRANULOCYTES # BLD AUTO: 0.07 K/UL (ref 0–0.11)
IMM GRANULOCYTES NFR BLD AUTO: 0.6 % (ref 0–0.9)
IMM RETICS NFR: 26.1 % (ref 9.3–17.4)
IRON SATN MFR SERPL: 13 % (ref 15–55)
IRON SERPL-MCNC: 36 UG/DL (ref 50–180)
LYMPHOCYTES # BLD AUTO: 1.56 K/UL (ref 1–4.8)
LYMPHOCYTES NFR BLD: 13.1 % (ref 22–41)
MAGNESIUM SERPL-MCNC: 1.7 MG/DL (ref 1.5–2.5)
MCH RBC QN AUTO: 31.4 PG (ref 27–33)
MCHC RBC AUTO-ENTMCNC: 34.6 G/DL (ref 33.7–35.3)
MCV RBC AUTO: 90.7 FL (ref 81.4–97.8)
MONOCYTES # BLD AUTO: 1.03 K/UL (ref 0–0.85)
MONOCYTES NFR BLD AUTO: 8.6 % (ref 0–13.4)
NEUTROPHILS # BLD AUTO: 9.14 K/UL (ref 1.82–7.42)
NEUTROPHILS NFR BLD: 76.5 % (ref 44–72)
NRBC # BLD AUTO: 0 K/UL
NRBC BLD-RTO: 0 /100 WBC
PHOSPHATE SERPL-MCNC: 2 MG/DL (ref 2.5–4.5)
PLATELET # BLD AUTO: 127 K/UL (ref 164–446)
PMV BLD AUTO: 10.3 FL (ref 9–12.9)
POTASSIUM SERPL-SCNC: 3.6 MMOL/L (ref 3.6–5.5)
PROT SERPL-MCNC: 5.6 G/DL (ref 6–8.2)
RBC # BLD AUTO: 2.36 M/UL (ref 4.7–6.1)
RETICS # AUTO: 0.09 M/UL (ref 0.04–0.06)
RETICS/RBC NFR: 3.9 % (ref 0.8–2.1)
RH BLD: ABNORMAL
SODIUM SERPL-SCNC: 140 MMOL/L (ref 135–145)
TIBC SERPL-MCNC: 273 UG/DL (ref 250–450)
UIBC SERPL-MCNC: 237 UG/DL (ref 110–370)
WBC # BLD AUTO: 11.9 K/UL (ref 4.8–10.8)

## 2022-05-30 PROCEDURE — 99233 SBSQ HOSP IP/OBS HIGH 50: CPT | Performed by: NURSE PRACTITIONER

## 2022-05-30 PROCEDURE — 700105 HCHG RX REV CODE 258: Performed by: SPECIALIST

## 2022-05-30 PROCEDURE — 80053 COMPREHEN METABOLIC PANEL: CPT

## 2022-05-30 PROCEDURE — A9270 NON-COVERED ITEM OR SERVICE: HCPCS | Performed by: SPECIALIST

## 2022-05-30 PROCEDURE — 36415 COLL VENOUS BLD VENIPUNCTURE: CPT

## 2022-05-30 PROCEDURE — 85046 RETICYTE/HGB CONCENTRATE: CPT

## 2022-05-30 PROCEDURE — 86850 RBC ANTIBODY SCREEN: CPT

## 2022-05-30 PROCEDURE — 82550 ASSAY OF CK (CPK): CPT

## 2022-05-30 PROCEDURE — 83550 IRON BINDING TEST: CPT

## 2022-05-30 PROCEDURE — 84100 ASSAY OF PHOSPHORUS: CPT

## 2022-05-30 PROCEDURE — 94669 MECHANICAL CHEST WALL OSCILL: CPT

## 2022-05-30 PROCEDURE — 770001 HCHG ROOM/CARE - MED/SURG/GYN PRIV*

## 2022-05-30 PROCEDURE — 700101 HCHG RX REV CODE 250: Performed by: SPECIALIST

## 2022-05-30 PROCEDURE — 86900 BLOOD TYPING SEROLOGIC ABO: CPT

## 2022-05-30 PROCEDURE — 71045 X-RAY EXAM CHEST 1 VIEW: CPT

## 2022-05-30 PROCEDURE — 72190 X-RAY EXAM OF PELVIS: CPT

## 2022-05-30 PROCEDURE — 99232 SBSQ HOSP IP/OBS MODERATE 35: CPT | Performed by: PHYSICIAN ASSISTANT

## 2022-05-30 PROCEDURE — 700102 HCHG RX REV CODE 250 W/ 637 OVERRIDE(OP): Performed by: SPECIALIST

## 2022-05-30 PROCEDURE — 82728 ASSAY OF FERRITIN: CPT

## 2022-05-30 PROCEDURE — 85025 COMPLETE CBC W/AUTO DIFF WBC: CPT

## 2022-05-30 PROCEDURE — 700102 HCHG RX REV CODE 250 W/ 637 OVERRIDE(OP): Performed by: NURSE PRACTITIONER

## 2022-05-30 PROCEDURE — 83540 ASSAY OF IRON: CPT

## 2022-05-30 PROCEDURE — A9270 NON-COVERED ITEM OR SERVICE: HCPCS | Performed by: NURSE PRACTITIONER

## 2022-05-30 PROCEDURE — 97166 OT EVAL MOD COMPLEX 45 MIN: CPT

## 2022-05-30 PROCEDURE — 97163 PT EVAL HIGH COMPLEX 45 MIN: CPT

## 2022-05-30 PROCEDURE — 86901 BLOOD TYPING SEROLOGIC RH(D): CPT

## 2022-05-30 PROCEDURE — 83735 ASSAY OF MAGNESIUM: CPT

## 2022-05-30 PROCEDURE — 97535 SELF CARE MNGMENT TRAINING: CPT

## 2022-05-30 RX ADMIN — DOCUSATE SODIUM 100 MG: 100 CAPSULE, LIQUID FILLED ORAL at 17:41

## 2022-05-30 RX ADMIN — METAXALONE 800 MG: 800 TABLET ORAL at 05:28

## 2022-05-30 RX ADMIN — METOPROLOL TARTRATE 12.5 MG: 25 TABLET, FILM COATED ORAL at 17:42

## 2022-05-30 RX ADMIN — SODIUM CHLORIDE, POTASSIUM CHLORIDE, SODIUM LACTATE AND CALCIUM CHLORIDE: 600; 310; 30; 20 INJECTION, SOLUTION INTRAVENOUS at 05:15

## 2022-05-30 RX ADMIN — DOCUSATE SODIUM 100 MG: 100 CAPSULE, LIQUID FILLED ORAL at 05:27

## 2022-05-30 RX ADMIN — Medication 1 EACH: at 17:41

## 2022-05-30 RX ADMIN — BISACODYL 10 MG: 10 SUPPOSITORY RECTAL at 13:43

## 2022-05-30 RX ADMIN — ACETAMINOPHEN 650 MG: 325 TABLET ORAL at 05:27

## 2022-05-30 RX ADMIN — ACETAMINOPHEN 650 MG: 325 TABLET ORAL at 13:42

## 2022-05-30 RX ADMIN — ACETAMINOPHEN 650 MG: 325 TABLET ORAL at 17:41

## 2022-05-30 RX ADMIN — VENLAFAXINE HYDROCHLORIDE 75 MG: 75 CAPSULE, EXTENDED RELEASE ORAL at 05:27

## 2022-05-30 RX ADMIN — SODIUM CHLORIDE, POTASSIUM CHLORIDE, SODIUM LACTATE AND CALCIUM CHLORIDE: 600; 310; 30; 20 INJECTION, SOLUTION INTRAVENOUS at 13:43

## 2022-05-30 RX ADMIN — OXYCODONE 5 MG: 5 TABLET ORAL at 05:27

## 2022-05-30 RX ADMIN — Medication 1 EACH: at 13:43

## 2022-05-30 RX ADMIN — METAXALONE 800 MG: 800 TABLET ORAL at 13:43

## 2022-05-30 RX ADMIN — GABAPENTIN 100 MG: 100 CAPSULE ORAL at 22:11

## 2022-05-30 RX ADMIN — Medication 1 EACH: at 05:28

## 2022-05-30 RX ADMIN — LIDOCAINE 1 PATCH: 50 PATCH TOPICAL at 05:27

## 2022-05-30 RX ADMIN — METAXALONE 800 MG: 800 TABLET ORAL at 17:41

## 2022-05-30 RX ADMIN — ACETAMINOPHEN 650 MG: 325 TABLET ORAL at 00:00

## 2022-05-30 RX ADMIN — OXYCODONE HYDROCHLORIDE 10 MG: 10 TABLET ORAL at 23:16

## 2022-05-30 RX ADMIN — OXYCODONE HYDROCHLORIDE 10 MG: 10 TABLET ORAL at 13:42

## 2022-05-30 RX ADMIN — ACETAMINOPHEN 650 MG: 325 TABLET ORAL at 23:16

## 2022-05-30 RX ADMIN — METOPROLOL TARTRATE 12.5 MG: 25 TABLET, FILM COATED ORAL at 10:17

## 2022-05-30 RX ADMIN — POLYETHYLENE GLYCOL 3350 1 PACKET: 17 POWDER, FOR SOLUTION ORAL at 17:42

## 2022-05-30 RX ADMIN — GABAPENTIN 100 MG: 100 CAPSULE ORAL at 13:43

## 2022-05-30 RX ADMIN — POLYETHYLENE GLYCOL 3350 1 PACKET: 17 POWDER, FOR SOLUTION ORAL at 05:27

## 2022-05-30 RX ADMIN — GABAPENTIN 100 MG: 100 CAPSULE ORAL at 05:28

## 2022-05-30 RX ADMIN — MAGNESIUM HYDROXIDE 30 ML: 400 SUSPENSION ORAL at 05:32

## 2022-05-30 ASSESSMENT — GAIT ASSESSMENTS: GAIT LEVEL OF ASSIST: UNABLE TO PARTICIPATE

## 2022-05-30 ASSESSMENT — PAIN DESCRIPTION - PAIN TYPE
TYPE: ACUTE PAIN

## 2022-05-30 ASSESSMENT — COGNITIVE AND FUNCTIONAL STATUS - GENERAL
DAILY ACTIVITIY SCORE: 18
STANDING UP FROM CHAIR USING ARMS: A LITTLE
CLIMB 3 TO 5 STEPS WITH RAILING: TOTAL
SUGGESTED CMS G CODE MODIFIER MOBILITY: CL
MOVING FROM LYING ON BACK TO SITTING ON SIDE OF FLAT BED: A LOT
HELP NEEDED FOR BATHING: A LOT
TURNING FROM BACK TO SIDE WHILE IN FLAT BAD: A LOT
TOILETING: A LOT
MOBILITY SCORE: 11
SUGGESTED CMS G CODE MODIFIER DAILY ACTIVITY: CK
MOVING TO AND FROM BED TO CHAIR: A LOT
WALKING IN HOSPITAL ROOM: TOTAL
DRESSING REGULAR LOWER BODY CLOTHING: A LOT

## 2022-05-30 ASSESSMENT — ENCOUNTER SYMPTOMS
VOMITING: 0
HEADACHES: 0
CHILLS: 0
SHORTNESS OF BREATH: 0
SENSORY CHANGE: 0
TINGLING: 0
DIZZINESS: 0
BACK PAIN: 0
MYALGIAS: 1
NECK PAIN: 0
CONSTIPATION: 1
NAUSEA: 0
SPEECH CHANGE: 0
ABDOMINAL PAIN: 1
FEVER: 0
FOCAL WEAKNESS: 0

## 2022-05-30 ASSESSMENT — ACTIVITIES OF DAILY LIVING (ADL): TOILETING: INDEPENDENT
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
## 2022-05-30 NOTE — PROGRESS NOTES
Assumed care of patient at 0645. Bedside report received. Assessment complete.  AA&Ox4. Denies SOB.  Reporting 6/10 pain. Medications given as ordered for pain control.    Educated patient regarding pharmacologic and non pharmacologic modalities for pain management.  Skin per flow sheets.  Tolerating diet. Denies N/V.  + void. Last BM 5/25/2022 Pt given multiple stool softeners today and is now passing gas.   Pt ambulates x1 w/ FWW.  Plan of care discussed, all questions answered. Educated on the importance of calling before getting OOB and pt verbalizes understanding. Educated regarding importance of oral care. Oral care kit at bedside. Call light is within reach, treaded slipper socks on, bed in lowest/ locked position, hourly rounding in place, all needs met at this time.    Joseline Esparza R.N.

## 2022-05-30 NOTE — PROGRESS NOTES
Jaz from Lab called with critical result of CPK 3668 at 0413. Critical lab result read back to Jaz.   Trauma APRN Kathie notified via voalte of critical lab result at 0420. No new orders were placed at this time.

## 2022-05-30 NOTE — PROGRESS NOTES
MD contacted by RN earlier this AM for clarification of pts activity order. Activity order states no restrictions, but notes conflict with orders. Pt has been getting OOB to restroom with walker. New orders placed, pt notified. X-rays ordered to reassess surgical interventions.    Joseline Esparza R.N.
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
## 2022-05-30 NOTE — PROGRESS NOTES
"   Orthopaedic PA Progress Note    Interval changes:Now on floor. No new complaints.    ROS - Patient denies any new issues. No chest pain, dyspnea, or fever.  Pain well controlled.    BP (!) 147/102   Pulse 85   Temp 36.1 °C (96.9 °F) (Temporal)   Resp 18   Ht 1.905 m (6' 3\")   Wt 76.8 kg (169 lb 5 oz)   SpO2 92%     Patient seen and examined  No acute distress  Breathing non labored  RRR  Surgical dressing is clean, dry, and intact. Patient clearly fires tibialis anterior, EHL, and gastrocnemius/soleus. Sensation is intact to light touch throughout superficial peroneal, deep peroneal, tibial, saphenous, and sural nerve distributions. Strong and palpable 2+ dorsalis pedis and posterior tibial pulses with capillary refill less than 2 seconds. No lower leg tenderness or discomfort.    Recent Labs     05/28/22  0440 05/29/22  0456 05/30/22  0038   WBC 18.1* 12.3* 11.9*   RBC 2.57* 2.27* 2.36*   HEMOGLOBIN 8.1* 7.1* 7.4*   HEMATOCRIT 23.5* 21.2* 21.4*   MCV 91.4 93.4 90.7   MCH 31.5 31.3 31.4   MCHC 34.5 33.5* 34.6   RDW 49.0 47.8 45.5   PLATELETCT 101* 103* 127*   MPV 10.2 10.3 10.3       Active Hospital Problems    Diagnosis    • Closed fracture of multiple ribs of both sides [S22.43XA]      Priority: High   • Contusion of both lungs [S27.322A]      Priority: High   • Acute deep vein thrombosis (DVT) of popliteal vein of left lower extremity (HCC) [I82.432]      Priority: High   • Closed bilateral fracture of pubic rami (HCC) [S32.591A, S32.592A]      Priority: High   • Splenic laceration, initial encounter [S36.039A]      Priority: High   • Liver laceration, initial encounter [S36.113A]      Priority: High   • Primary hypertension [I10]      Priority: Medium   • Acute blood loss anemia [D62]      Priority: Medium   • Ankle fracture, left, closed, initial encounter [S82.892A]      Priority: Medium   • Leukocytosis [D72.829]      Priority: Medium   • Closed fracture of transverse process of lumbar vertebra (HCC) " [S32.009A]      Priority: Medium   • Discharge planning issues [Z02.9]      Priority: Low   • Trauma [T14.90XA]      Priority: Low   • Anxiety [F41.9]      Priority: Low       Assessment/Plan:  POD#2 S/P  1.  Percutaneous skeletal fixation of left sacroiliac joint fracture dislocation through first sacral segment.   2.  PSF L SI joint fracture dislocation through second sacral segment.  3.  PSF left and right anterior pelvic fractures.  Weightbearing recs as follows: TTWB LLE and WB RLE for transfers only    PT/OT for wheelchair transfer training.  Ancef - completed  LMWH/SCDs   Disposition Follow-Up: needs appointment with Dr. Han at [facility] at 10-14 days post-op for re-evaluation, staple removal and radiographs. Clear for disposition (home/SNF/IRF) from Orthopaedic team standpoint.

## 2022-05-30 NOTE — PROGRESS NOTES
Report received from dayshift RN, assumed care at 1900.  Assessment complete.  A&O x 4. Patient calls appropriately.  Patient ambulates with x1 assist with FWW.  Patient has 4/10 pain. Denied interventions at this time.   Denies N&V. Tolerating clear liquid diet.  Surgical bilateral hip sites, gauze and tegaderm, CD&I. Right groin incision site, gauze and tegaderm, CD&I.  + void, + flatus, - BM.  Patient denies SOB. On 1L NC  Patient calm, pleasant, and cooperative.  Review plan with of care with patient. Call light and personal belongings within reach. Hourly rounding in place. All needs met at this time.
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
## 2022-05-30 NOTE — CARE PLAN
Problem: Knowledge Deficit - Standard  Goal: Patient and family/care givers will demonstrate understanding of plan of care, disease process/condition, diagnostic tests and medications  Outcome: Progressing     Problem: Pain - Standard  Goal: Alleviation of pain or a reduction in pain to the patient’s comfort goal  Outcome: Progressing     Problem: Skin Integrity  Goal: Skin integrity is maintained or improved  Outcome: Progressing   The patient is Stable - Low risk of patient condition declining or worsening    Shift Goals  Clinical Goals: increase mobility, pain control, PT/OT  Patient Goals: comfort, OOB to chair  Family Goals: N/A    Progress made toward(s) clinical / shift goals: Pt states no pain. Pt OOB to chair. PT/OT eval completed and both recommending Rehab.    Patient is not progressing towards the following goals: N/A    Joseline Esparza R.N.
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
## 2022-05-30 NOTE — PROGRESS NOTES
Assumed care of patient at 0645. Bedside report received. Assessment complete.  AA&Ox4. Denies SOB on room air.  Reporting 0/10 pain. Declined pharmacologic intervention at this time.   Educated patient regarding pharmacologic and non pharmacologic modalities for pain management.  Skin per flow sheets.  Tolerating clear liquid diet and now advanced to GI- soft diet. Denies N/V.  + void. Last BM PTA, will give PRN medications today as ordered.   See RN note regarding WB status. Pt WB only for transfers on RLE. Pt TTWB on LLE. Pt up x 1 assist w/ FWW.    Plan of care discussed, all questions answered. Educated on the importance of calling before getting OOB and pt verbalizes understanding. Educated regarding importance of oral care. Oral care kit at bedside. Call light is within reach, treaded slipper socks on, bed in lowest/ locked position, hourly rounding in place, all needs met at this time.    Joseline Esparza R.N.

## 2022-05-30 NOTE — CARE PLAN
The patient is Stable - Low risk of patient condition declining or worsening    Shift Goals  Clinical Goals: Increase ambulation  Patient Goals: Rest  Family Goals: Stay up to date    Progress made toward(s) clinical / shift goals:      Problem: Knowledge Deficit - Standard  Goal: Patient and family/care givers will demonstrate understanding of plan of care, disease process/condition, diagnostic tests and medications  Outcome: Progressing   Educated patient on plan of care, medications, and condition. All questions addressed.     Problem: Pain - Standard  Goal: Alleviation of pain or a reduction in pain to the patient’s comfort goal  Outcome: Progressing   Pain medication administered PRN.

## 2022-05-30 NOTE — CARE PLAN
Problem: Knowledge Deficit - Standard  Goal: Patient and family/care givers will demonstrate understanding of plan of care, disease process/condition, diagnostic tests and medications  Outcome: Progressing     Problem: Pain - Standard  Goal: Alleviation of pain or a reduction in pain to the patient’s comfort goal  Outcome: Progressing     Problem: Skin Integrity  Goal: Skin integrity is maintained or improved  Outcome: Progressing   The patient is Stable - Low risk of patient condition declining or worsening    Shift Goals  Clinical Goals: pain control, comfort, increase mobility  Patient Goals: pain control  Family Goals: Stay up to date    Progress made toward(s) clinical / shift goals:  see RN progress note    Patient is not progressing towards the following goals: N/A    Joseline Esparza R.N.

## 2022-05-31 LAB
ALBUMIN SERPL BCP-MCNC: 3.8 G/DL (ref 3.2–4.9)
ALBUMIN/GLOB SERPL: 1.6 G/DL
ALP SERPL-CCNC: 158 U/L (ref 30–99)
ALT SERPL-CCNC: 118 U/L (ref 2–50)
ANION GAP SERPL CALC-SCNC: 10 MMOL/L (ref 7–16)
AST SERPL-CCNC: 151 U/L (ref 12–45)
BASOPHILS # BLD AUTO: 0.3 % (ref 0–1.8)
BASOPHILS # BLD: 0.03 K/UL (ref 0–0.12)
BILIRUB SERPL-MCNC: 1.6 MG/DL (ref 0.1–1.5)
BUN SERPL-MCNC: 9 MG/DL (ref 8–22)
CALCIUM SERPL-MCNC: 8.7 MG/DL (ref 8.5–10.5)
CHLORIDE SERPL-SCNC: 102 MMOL/L (ref 96–112)
CK SERPL-CCNC: 2889 U/L (ref 0–154)
CO2 SERPL-SCNC: 28 MMOL/L (ref 20–33)
CREAT SERPL-MCNC: 0.57 MG/DL (ref 0.5–1.4)
EOSINOPHIL # BLD AUTO: 0.29 K/UL (ref 0–0.51)
EOSINOPHIL NFR BLD: 2.4 % (ref 0–6.9)
ERYTHROCYTE [DISTWIDTH] IN BLOOD BY AUTOMATED COUNT: 46.1 FL (ref 35.9–50)
GFR SERPLBLD CREATININE-BSD FMLA CKD-EPI: 131 ML/MIN/1.73 M 2
GLOBULIN SER CALC-MCNC: 2.4 G/DL (ref 1.9–3.5)
GLUCOSE SERPL-MCNC: 121 MG/DL (ref 65–99)
HCT VFR BLD AUTO: 25.6 % (ref 42–52)
HGB BLD-MCNC: 8.6 G/DL (ref 14–18)
IMM GRANULOCYTES # BLD AUTO: 0.16 K/UL (ref 0–0.11)
IMM GRANULOCYTES NFR BLD AUTO: 1.4 % (ref 0–0.9)
LYMPHOCYTES # BLD AUTO: 1.85 K/UL (ref 1–4.8)
LYMPHOCYTES NFR BLD: 15.6 % (ref 22–41)
MCH RBC QN AUTO: 31.2 PG (ref 27–33)
MCHC RBC AUTO-ENTMCNC: 33.6 G/DL (ref 33.7–35.3)
MCV RBC AUTO: 92.8 FL (ref 81.4–97.8)
MONOCYTES # BLD AUTO: 1.21 K/UL (ref 0–0.85)
MONOCYTES NFR BLD AUTO: 10.2 % (ref 0–13.4)
NEUTROPHILS # BLD AUTO: 8.31 K/UL (ref 1.82–7.42)
NEUTROPHILS NFR BLD: 70.1 % (ref 44–72)
NRBC # BLD AUTO: 0 K/UL
NRBC BLD-RTO: 0 /100 WBC
PLATELET # BLD AUTO: 192 K/UL (ref 164–446)
PMV BLD AUTO: 10.1 FL (ref 9–12.9)
POTASSIUM SERPL-SCNC: 3.2 MMOL/L (ref 3.6–5.5)
PROT SERPL-MCNC: 6.2 G/DL (ref 6–8.2)
RBC # BLD AUTO: 2.76 M/UL (ref 4.7–6.1)
SODIUM SERPL-SCNC: 140 MMOL/L (ref 135–145)
WBC # BLD AUTO: 11.9 K/UL (ref 4.8–10.8)

## 2022-05-31 PROCEDURE — 700105 HCHG RX REV CODE 258: Performed by: NURSE PRACTITIONER

## 2022-05-31 PROCEDURE — 99223 1ST HOSP IP/OBS HIGH 75: CPT | Performed by: PHYSICAL MEDICINE & REHABILITATION

## 2022-05-31 PROCEDURE — 99232 SBSQ HOSP IP/OBS MODERATE 35: CPT | Mod: FS | Performed by: NURSE PRACTITIONER

## 2022-05-31 PROCEDURE — 85025 COMPLETE CBC W/AUTO DIFF WBC: CPT

## 2022-05-31 PROCEDURE — 700102 HCHG RX REV CODE 250 W/ 637 OVERRIDE(OP): Performed by: SPECIALIST

## 2022-05-31 PROCEDURE — 36415 COLL VENOUS BLD VENIPUNCTURE: CPT

## 2022-05-31 PROCEDURE — 80053 COMPREHEN METABOLIC PANEL: CPT

## 2022-05-31 PROCEDURE — 700105 HCHG RX REV CODE 258: Performed by: SPECIALIST

## 2022-05-31 PROCEDURE — 700101 HCHG RX REV CODE 250: Performed by: SPECIALIST

## 2022-05-31 PROCEDURE — A9270 NON-COVERED ITEM OR SERVICE: HCPCS | Performed by: SPECIALIST

## 2022-05-31 PROCEDURE — 770001 HCHG ROOM/CARE - MED/SURG/GYN PRIV*

## 2022-05-31 PROCEDURE — A9270 NON-COVERED ITEM OR SERVICE: HCPCS | Performed by: NURSE PRACTITIONER

## 2022-05-31 PROCEDURE — 94669 MECHANICAL CHEST WALL OSCILL: CPT

## 2022-05-31 PROCEDURE — 700101 HCHG RX REV CODE 250: Performed by: NURSE PRACTITIONER

## 2022-05-31 PROCEDURE — 700102 HCHG RX REV CODE 250 W/ 637 OVERRIDE(OP): Performed by: NURSE PRACTITIONER

## 2022-05-31 PROCEDURE — 82550 ASSAY OF CK (CPK): CPT

## 2022-05-31 RX ORDER — OXYCODONE HYDROCHLORIDE 5 MG/1
5 TABLET ORAL EVERY 6 HOURS PRN
Status: DISCONTINUED | OUTPATIENT
Start: 2022-05-31 | End: 2022-06-01

## 2022-05-31 RX ORDER — OXYCODONE HYDROCHLORIDE 5 MG/1
2.5 TABLET ORAL EVERY 6 HOURS PRN
Status: DISCONTINUED | OUTPATIENT
Start: 2022-05-31 | End: 2022-06-01

## 2022-05-31 RX ADMIN — OXYCODONE 5 MG: 5 TABLET ORAL at 21:50

## 2022-05-31 RX ADMIN — Medication 1 EACH: at 17:51

## 2022-05-31 RX ADMIN — SODIUM CHLORIDE, POTASSIUM CHLORIDE, SODIUM LACTATE AND CALCIUM CHLORIDE: 600; 310; 30; 20 INJECTION, SOLUTION INTRAVENOUS at 04:11

## 2022-05-31 RX ADMIN — ACETAMINOPHEN 650 MG: 325 TABLET ORAL at 17:50

## 2022-05-31 RX ADMIN — GABAPENTIN 100 MG: 100 CAPSULE ORAL at 20:56

## 2022-05-31 RX ADMIN — GABAPENTIN 100 MG: 100 CAPSULE ORAL at 12:36

## 2022-05-31 RX ADMIN — METOPROLOL TARTRATE 12.5 MG: 25 TABLET, FILM COATED ORAL at 17:49

## 2022-05-31 RX ADMIN — OXYCODONE 5 MG: 5 TABLET ORAL at 15:50

## 2022-05-31 RX ADMIN — VENLAFAXINE HYDROCHLORIDE 75 MG: 75 CAPSULE, EXTENDED RELEASE ORAL at 04:45

## 2022-05-31 RX ADMIN — ACETAMINOPHEN 650 MG: 325 TABLET ORAL at 23:41

## 2022-05-31 RX ADMIN — ACETAMINOPHEN 650 MG: 325 TABLET ORAL at 04:45

## 2022-05-31 RX ADMIN — LIDOCAINE 1 PATCH: 50 PATCH TOPICAL at 04:45

## 2022-05-31 RX ADMIN — METAXALONE 800 MG: 800 TABLET ORAL at 04:45

## 2022-05-31 RX ADMIN — POTASSIUM PHOSPHATE, MONOBASIC AND POTASSIUM PHOSPHATE, DIBASIC 30 MMOL: 224; 236 INJECTION, SOLUTION, CONCENTRATE INTRAVENOUS at 09:30

## 2022-05-31 RX ADMIN — SODIUM CHLORIDE, POTASSIUM CHLORIDE, SODIUM LACTATE AND CALCIUM CHLORIDE: 600; 310; 30; 20 INJECTION, SOLUTION INTRAVENOUS at 18:46

## 2022-05-31 RX ADMIN — GABAPENTIN 100 MG: 100 CAPSULE ORAL at 04:45

## 2022-05-31 RX ADMIN — Medication 1 EACH: at 04:45

## 2022-05-31 RX ADMIN — METOPROLOL TARTRATE 12.5 MG: 25 TABLET, FILM COATED ORAL at 04:46

## 2022-05-31 RX ADMIN — RIVAROXABAN 15 MG: 15 TABLET, FILM COATED ORAL at 17:51

## 2022-05-31 RX ADMIN — ACETAMINOPHEN 650 MG: 325 TABLET ORAL at 12:36

## 2022-05-31 RX ADMIN — OXYCODONE 5 MG: 5 TABLET ORAL at 09:24

## 2022-05-31 RX ADMIN — DOCUSATE SODIUM 100 MG: 100 CAPSULE, LIQUID FILLED ORAL at 17:50

## 2022-05-31 RX ADMIN — ACETAMINOPHEN 650 MG: 325 TABLET ORAL at 09:23

## 2022-05-31 RX ADMIN — METAXALONE 800 MG: 800 TABLET ORAL at 17:49

## 2022-05-31 RX ADMIN — Medication 1 EACH: at 12:36

## 2022-05-31 RX ADMIN — METAXALONE 800 MG: 800 TABLET ORAL at 12:36

## 2022-05-31 ASSESSMENT — ENCOUNTER SYMPTOMS
SPEECH CHANGE: 0
MYALGIAS: 0
FOCAL WEAKNESS: 0
BACK PAIN: 1
BRUISES/BLEEDS EASILY: 0
PALPITATIONS: 0
DIZZINESS: 0
WEAKNESS: 0
TINGLING: 0
HEADACHES: 0
COUGH: 0
BLURRED VISION: 0
FEVER: 0
MYALGIAS: 1
SHORTNESS OF BREATH: 0
ABDOMINAL PAIN: 0
NAUSEA: 0
VOMITING: 0
NECK PAIN: 0
BACK PAIN: 0
CHILLS: 0
SENSORY CHANGE: 0
HEMOPTYSIS: 0
CONSTIPATION: 0

## 2022-05-31 ASSESSMENT — PAIN DESCRIPTION - PAIN TYPE
TYPE: ACUTE PAIN
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
## 2022-05-31 NOTE — PROGRESS NOTES
Report received from dayshift RN, assumed care at 1900.  Assessment complete.  A&O x 4. Patient calls appropriately.  Patient transfers to wheelchair standby assist with FWW. RLE WBAT, LLE toe touch for transfers.  Patient has 5/10 pain. Denied pain medication at this time.  Denies N&V. Tolerating low fiber GI soft diet.  Surgical bilateral hip sites, gauze and tegaderm. Right groin site, gauze and tegaderm, CD&I.  + void, + flatus, + BM.  Patient denies SOB. On room air.  Patient calm and pleasant.  Review plan with of care with patient. Call light and personal belongings within reach. Hourly rounding in place. All needs met at this time.

## 2022-05-31 NOTE — CARE PLAN
Problem: Knowledge Deficit - Standard  Goal: Patient and family/care givers will demonstrate understanding of plan of care, disease process/condition, diagnostic tests and medications  Outcome: Progressing     Problem: Pain - Standard  Goal: Alleviation of pain or a reduction in pain to the patient’s comfort goal  Outcome: Progressing     Problem: Skin Integrity  Goal: Skin integrity is maintained or improved  Outcome: Progressing   The patient is Stable - Low risk of patient condition declining or worsening    Shift Goals  Clinical Goals: pain control, maintain WB status  Patient Goals: comfort  Family Goals: N/A    Progress made toward(s) clinical / shift goals: Pts pain fluctuates. Pt stated no pain at beginning of shift and then a bit later c/o 8/10 pain. Pt OOB to wheelchair and recliner. Pts pulses WDL today on all extremities. Pt pulling 2250 on IS and is self motivated.    Patient is not progressing towards the following goals: N/A    Joseline Esparza R.N.
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
## 2022-05-31 NOTE — DISCHARGE PLANNING
[facility] Transitional Care Coordination    Physiatry consult complete.  Dr. Eddy recommending good candidate for inpatient rehab.      Submitting case to [facility] for consideration of IRF level care.  Will follow for auth determination.      1620 - Per [facility] PAR - Unfortunately, patient does not have insurance benefit for IRF level care.  Voalte update to T4 CM Stanton TYLER will no longer follow.

## 2022-05-31 NOTE — PROGRESS NOTES
Trauma / Surgical Daily Progress Note    Date of Service  5/31/2022    Chief Complaint  36 y.o. male admitted 5/26/2022 with  bilateral rib fractures, pulmonary contusions, trace left pneumothorax, grade 2 liver injury, grade 3 spleen injury, unstable pelvic ring fractures, L4/L5 transverse process fractures, left ankle fracture, and acute left popliteal DVT after a alf     POD # 4 ORIF pelvis  POD # 2 IVC filter placement     Interval Events  Feeling better daily, up to chair, motivated and ready eager for rehab, tolerating oral diet, BM x 2  Hgb stable  Potassium low normal today    - Electrolyte replacement  - Regular diet  - Will discuss therapeutic anticoagulation with attending  - Medically cleared for post acute services  - Rehab referral pending    Review of Systems  Review of Systems   Constitutional: Negative for chills and fever.   Respiratory: Negative for shortness of breath.    Cardiovascular: Negative for chest pain.   Gastrointestinal: Negative for abdominal pain, constipation (BM 5/30) and nausea.   Genitourinary: Negative for dysuria (voiding).   Musculoskeletal: Positive for joint pain (pelvic, left ankle) and myalgias (left chest wall). Negative for back pain and neck pain.   Skin: Negative for rash.   Neurological: Negative for dizziness, tingling, sensory change, speech change, focal weakness and headaches.        Vital Signs  Temp:  [36.6 °C (97.8 °F)-37.7 °C (99.8 °F)] 37.2 °C (98.9 °F)  Pulse:  [77-92] 77  Resp:  [18-19] 18  BP: (126-146)/() 135/82  SpO2:  [87 %-96 %] 94 %    Physical Exam  Physical Exam  Vitals and nursing note reviewed.   Constitutional:       General: He is awake. He is not in acute distress.     Appearance: He is well-developed. He is not ill-appearing.      Interventions: Nasal cannula in place.      Comments: Up to chair   HENT:      Head: Normocephalic and atraumatic.      Right Ear: External ear normal.      Left Ear: External ear normal.      Nose: Nose  normal.      Mouth/Throat:      Mouth: Mucous membranes are moist.      Pharynx: Oropharynx is clear.   Eyes:      Pupils: Pupils are equal, round, and reactive to light.   Pulmonary:      Effort: Pulmonary effort is normal. No respiratory distress.   Abdominal:      General: There is no distension.      Palpations: Abdomen is soft.      Tenderness: There is no abdominal tenderness. There is no guarding.   Musculoskeletal:      Cervical back: Neck supple.      Comments: Right hip surgical dressing not visualized   Skin:     General: Skin is warm and dry.      Comments: Right groin dressing not visualized   Neurological:      Mental Status: He is alert.      GCS: GCS eye subscore is 4. GCS verbal subscore is 5. GCS motor subscore is 6.   Psychiatric:         Mood and Affect: Mood normal.         Behavior: Behavior normal. Behavior is cooperative.         Laboratory  Recent Results (from the past 24 hour(s))   CBC with Differential: Tomorrow AM    Collection Time: 05/31/22  4:46 AM   Result Value Ref Range    WBC 11.9 (H) 4.8 - 10.8 K/uL    RBC 2.76 (L) 4.70 - 6.10 M/uL    Hemoglobin 8.6 (L) 14.0 - 18.0 g/dL    Hematocrit 25.6 (L) 42.0 - 52.0 %    MCV 92.8 81.4 - 97.8 fL    MCH 31.2 27.0 - 33.0 pg    MCHC 33.6 (L) 33.7 - 35.3 g/dL    RDW 46.1 35.9 - 50.0 fL    Platelet Count 192 164 - 446 K/uL    MPV 10.1 9.0 - 12.9 fL    Neutrophils-Polys 70.10 44.00 - 72.00 %    Lymphocytes 15.60 (L) 22.00 - 41.00 %    Monocytes 10.20 0.00 - 13.40 %    Eosinophils 2.40 0.00 - 6.90 %    Basophils 0.30 0.00 - 1.80 %    Immature Granulocytes 1.40 (H) 0.00 - 0.90 %    Nucleated RBC 0.00 /100 WBC    Neutrophils (Absolute) 8.31 (H) 1.82 - 7.42 K/uL    Lymphs (Absolute) 1.85 1.00 - 4.80 K/uL    Monos (Absolute) 1.21 (H) 0.00 - 0.85 K/uL    Eos (Absolute) 0.29 0.00 - 0.51 K/uL    Baso (Absolute) 0.03 0.00 - 0.12 K/uL    Immature Granulocytes (abs) 0.16 (H) 0.00 - 0.11 K/uL    NRBC (Absolute) 0.00 K/uL   Comp Metabolic Panel (CMP): Tomorrow  AM    Collection Time: 05/31/22  4:46 AM   Result Value Ref Range    Sodium 140 135 - 145 mmol/L    Potassium 3.2 (L) 3.6 - 5.5 mmol/L    Chloride 102 96 - 112 mmol/L    Co2 28 20 - 33 mmol/L    Anion Gap 10.0 7.0 - 16.0    Glucose 121 (H) 65 - 99 mg/dL    Bun 9 8 - 22 mg/dL    Creatinine 0.57 0.50 - 1.40 mg/dL    Calcium 8.7 8.5 - 10.5 mg/dL    AST(SGOT) 151 (H) 12 - 45 U/L    ALT(SGPT) 118 (H) 2 - 50 U/L    Alkaline Phosphatase 158 (H) 30 - 99 U/L    Total Bilirubin 1.6 (H) 0.1 - 1.5 mg/dL    Albumin 3.8 3.2 - 4.9 g/dL    Total Protein 6.2 6.0 - 8.2 g/dL    Globulin 2.4 1.9 - 3.5 g/dL    A-G Ratio 1.6 g/dL   CREATINE KINASE    Collection Time: 05/31/22  4:46 AM   Result Value Ref Range    CPK Total 2889 (HH) 0 - 154 U/L   ESTIMATED GFR    Collection Time: 05/31/22  4:46 AM   Result Value Ref Range    GFR (CKD-EPI) 131 >60 mL/min/1.73 m 2       Fluids    Intake/Output Summary (Last 24 hours) at 5/31/2022 1059  Last data filed at 5/31/2022 0446  Gross per 24 hour   Intake 1374.58 ml   Output 2175 ml   Net -800.42 ml       Core Measures & Quality Metrics  Labs reviewed, Medications reviewed and Radiology images reviewed  Olmos catheter: No Olmos      DVT Prophylaxis: Contraindicated - High bleeding risk (5/29 IVC filter placement)  DVT prophylaxis - mechanical: SCDs  Ulcer prophylaxis: Not indicated    Assessed for rehab: Patient was assess for and/or received rehabilitation services during this hospitalization    RAP Score Total: 8    ETOH Screening  CAGE Score: 0  Assessment complete date: 5/29/2022 (Admission BA negative, CAGE negative)        Assessment/Plan  Liver laceration, initial encounter- (present on admission)  Assessment & Plan  Grade 2 liver injury of the right lobe. Minimal associated hematoma.  Serial abdominal exams and laboratory studies stable.    Splenic laceration, initial encounter- (present on admission)  Assessment & Plan  Grade 3 splenic laceration with small hematoma.  Serial abdominal exams  and laboratory studies stable.    Closed bilateral fracture of pubic rami (HCC)- (present on admission)  Assessment & Plan  Pelvic shear injury. Left superior and inferior pubic rami fractures. Right inferior pubic rami fracture and some subtle fracture. Left sacral fracture. Widening of the left SI joint compatible with SI joint injury.  [place] traction applied on admission.  5/27 ORIF pelvis.  5/28 CT cystogram negative for bladder leak.  5/29 Olmos removal.  Weight bearing status - Touch toe weightbearing LLE x 6-8 weeks post op, WB RLE for transfers only.  Manolo Han MD. Orthopedic Surgeon. [facility].    Acute deep vein thrombosis (DVT) of popliteal vein of left lower extremity (HCC)- (present on admission)  Assessment & Plan  Prophylactic anticoagulation for thrombotic prevention initially contraindicated secondary to elevated bleeding risk.   5/27 Cleared for prophylactic anticoagulation per ortho. Will continue to hold secondary to trend down of hemoglobin.  5/28 Trauma screening bilateral lower extremity venous duplex positive for above knee DVT, left popliteal.  5/28 IVC filter placed.  Plan for therapeutic anticoagulation once medically cleared.  Will need outpatient follow up with Anticoagulation clinic upon discharge.    Contusion of both lungs- (present on admission)  Assessment & Plan  Patchy bilateral pulmonary infiltrates, predominantly on the left.  Supplemental oxygen to maintain SaO2 greater than 95%.  Aggressive pulmonary hygiene and serial chest radiography.    Closed fracture of multiple ribs of both sides- (present on admission)  Assessment & Plan  Left posterior second through fifth and seventh through 11th rib fractures. Third through eighth rib flail segments.  Posterior right 10th rib fracture.  Aggressive pulmonary hygiene and multimodal pain management.    Primary hypertension  Assessment & Plan  5/30 Metoprolol initiated.    Acute blood loss anemia  Assessment &  Plan  5/30 Iron studies completed, IV replacement not indicated.  Continue to trend closely.  Transfuse 1 unit PRBC's for hemoglobin less than 7.    Ankle fracture, left, closed, initial encounter- (present on admission)  Assessment & Plan  Ossific density adjacent to the medial malleolus, appearance suggests small ligaments avulsion fracture fragment.  Non-operative management.  Weight bearing status - Touch toe weightbearing LLE, left ankle brace or boot as needed for comfort.  Manolo Han MD. Orthopedic Surgeon. [facility].    Closed fracture of transverse process of lumbar vertebra (HCC)- (present on admission)  Assessment & Plan  Left L4 and L5 transverse processes fractures.  Neurosurgery consultation not indicated.  Multimodal pain regimen.    Leukocytosis- (present on admission)  Assessment & Plan  Admission WBC 39.6.  Received intraoperative abx.  5/31 WBC 11.9.    Discharge planning issues- (present on admission)  Assessment & Plan  Date of admission: 5/26/2022.  5/28 Transfer orders from SICU.  5/29 Rehab referral.  Cleared for discharge: Yes - Date: 5/31.  Discharge delayed: No.  Discharge date: tbd.    Trauma- (present on admission)  Assessment & Plan  [place] [facility] [place] Activation [facility] from [facility].  Chapito Meza DO. Trauma Surgery.    Anxiety- (present on admission)  Assessment & Plan  Chronic condition treated with venlafaxine.  Resumed maintenance medication on admission.      Discussed patient condition with RN, , , Patient and trauma surgery. Dr. Meza
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
## 2022-05-31 NOTE — CARE PLAN
The patient is Stable - Low risk of patient condition declining or worsening    Shift Goals  Clinical Goals: Pain control, pulmonary hygiene  Patient Goals: Rest  Family Goals: N/A    Progress made toward(s) clinical / shift goals:      Problem: Knowledge Deficit - Standard  Goal: Patient and family/care givers will demonstrate understanding of plan of care, disease process/condition, diagnostic tests and medications  Outcome: Progressing   Educated patient on plan of care, medications, and condition. All questions addressed.     Problem: Pain - Standard  Goal: Alleviation of pain or a reduction in pain to the patient’s comfort goal  Outcome: Progressing   Pain medication administered PRN and reassessed frequently.

## 2022-05-31 NOTE — PROGRESS NOTES
Assumed care of patient at 0645. Bedside report received. Assessment complete.  AA&Ox4. Denies SOB.    Reporting 0/10 pain. Declined pharmacologic intervention at this time.   Educated patient regarding pharmacologic and non pharmacologic modalities for pain management.    Skin per flow sheets.    Tolerating GI soft diet. Denies N/V.  + void in restroom. Last BM last PM, 5/30/2022.  Pt WB only for tx on RLE and TTWB on LLE. Pt needs encouragement to follow WB status but is OOB most of day to wheelchair and recliner.     Waffle mattress added to pts bed for pressure ulcer prevention.    Plan of care discussed, all questions answered. Educated on the importance of calling before getting OOB and pt verbalizes understanding. Educated regarding importance of oral care. Oral care kit at bedside. Call light is within reach, treaded slipper socks on, bed in lowest/ locked position, hourly rounding in place, all needs met at this time.    Joseline Esparza R.N.
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
## 2022-06-01 ENCOUNTER — APPOINTMENT (OUTPATIENT)
Dept: RADIOLOGY | Facility: MEDICAL CENTER | Age: 37
DRG: 957 | End: 2022-06-01
Attending: NURSE PRACTITIONER
Payer: COMMERCIAL

## 2022-06-01 ENCOUNTER — APPOINTMENT (OUTPATIENT)
Dept: RADIOLOGY | Facility: MEDICAL CENTER | Age: 37
DRG: 957 | End: 2022-06-01
Attending: ORTHOPAEDIC SURGERY
Payer: COMMERCIAL

## 2022-06-01 LAB
ALBUMIN SERPL BCP-MCNC: 4.3 G/DL (ref 3.2–4.9)
ALBUMIN/GLOB SERPL: 1.7 G/DL
ALP SERPL-CCNC: 205 U/L (ref 30–99)
ALT SERPL-CCNC: 152 U/L (ref 2–50)
ANION GAP SERPL CALC-SCNC: 13 MMOL/L (ref 7–16)
APPEARANCE UR: ABNORMAL
AST SERPL-CCNC: 157 U/L (ref 12–45)
BACTERIA #/AREA URNS HPF: NEGATIVE /HPF
BASOPHILS # BLD AUTO: 0.3 % (ref 0–1.8)
BASOPHILS # BLD: 0.05 K/UL (ref 0–0.12)
BILIRUB SERPL-MCNC: 2.4 MG/DL (ref 0.1–1.5)
BILIRUB UR QL STRIP.AUTO: NEGATIVE
BUN SERPL-MCNC: 9 MG/DL (ref 8–22)
CALCIUM SERPL-MCNC: 9.2 MG/DL (ref 8.5–10.5)
CHLORIDE SERPL-SCNC: 101 MMOL/L (ref 96–112)
CK SERPL-CCNC: 2575 U/L (ref 0–154)
CO2 SERPL-SCNC: 25 MMOL/L (ref 20–33)
COLOR UR: YELLOW
CREAT SERPL-MCNC: 0.56 MG/DL (ref 0.5–1.4)
EOSINOPHIL # BLD AUTO: 0.51 K/UL (ref 0–0.51)
EOSINOPHIL NFR BLD: 2.9 % (ref 0–6.9)
EPI CELLS #/AREA URNS HPF: NEGATIVE /HPF
ERYTHROCYTE [DISTWIDTH] IN BLOOD BY AUTOMATED COUNT: 45.3 FL (ref 35.9–50)
GFR SERPLBLD CREATININE-BSD FMLA CKD-EPI: 131 ML/MIN/1.73 M 2
GLOBULIN SER CALC-MCNC: 2.6 G/DL (ref 1.9–3.5)
GLUCOSE SERPL-MCNC: 113 MG/DL (ref 65–99)
GLUCOSE UR STRIP.AUTO-MCNC: NEGATIVE MG/DL
HCT VFR BLD AUTO: 27.4 % (ref 42–52)
HGB BLD-MCNC: 9.4 G/DL (ref 14–18)
HYALINE CASTS #/AREA URNS LPF: ABNORMAL /LPF
IMM GRANULOCYTES # BLD AUTO: 0.35 K/UL (ref 0–0.11)
IMM GRANULOCYTES NFR BLD AUTO: 2 % (ref 0–0.9)
KETONES UR STRIP.AUTO-MCNC: NEGATIVE MG/DL
LEUKOCYTE ESTERASE UR QL STRIP.AUTO: NEGATIVE
LYMPHOCYTES # BLD AUTO: 2.23 K/UL (ref 1–4.8)
LYMPHOCYTES NFR BLD: 12.9 % (ref 22–41)
MCH RBC QN AUTO: 31.1 PG (ref 27–33)
MCHC RBC AUTO-ENTMCNC: 34.3 G/DL (ref 33.7–35.3)
MCV RBC AUTO: 90.7 FL (ref 81.4–97.8)
MICRO URNS: ABNORMAL
MONOCYTES # BLD AUTO: 2.25 K/UL (ref 0–0.85)
MONOCYTES NFR BLD AUTO: 13 % (ref 0–13.4)
NEUTROPHILS # BLD AUTO: 11.93 K/UL (ref 1.82–7.42)
NEUTROPHILS NFR BLD: 68.9 % (ref 44–72)
NITRITE UR QL STRIP.AUTO: NEGATIVE
NRBC # BLD AUTO: 0.02 K/UL
NRBC BLD-RTO: 0.1 /100 WBC
PH UR STRIP.AUTO: 8 [PH] (ref 5–8)
PLATELET # BLD AUTO: 262 K/UL (ref 164–446)
PMV BLD AUTO: 9.8 FL (ref 9–12.9)
POTASSIUM SERPL-SCNC: 3.4 MMOL/L (ref 3.6–5.5)
PROT SERPL-MCNC: 6.9 G/DL (ref 6–8.2)
PROT UR QL STRIP: NEGATIVE MG/DL
RBC # BLD AUTO: 3.02 M/UL (ref 4.7–6.1)
RBC # URNS HPF: ABNORMAL /HPF
RBC UR QL AUTO: NEGATIVE
SODIUM SERPL-SCNC: 139 MMOL/L (ref 135–145)
SP GR UR STRIP.AUTO: 1.01
UROBILINOGEN UR STRIP.AUTO-MCNC: 1 MG/DL
WBC # BLD AUTO: 17.3 K/UL (ref 4.8–10.8)
WBC #/AREA URNS HPF: ABNORMAL /HPF

## 2022-06-01 PROCEDURE — 700111 HCHG RX REV CODE 636 W/ 250 OVERRIDE (IP): Performed by: NURSE PRACTITIONER

## 2022-06-01 PROCEDURE — 700102 HCHG RX REV CODE 250 W/ 637 OVERRIDE(OP): Performed by: SPECIALIST

## 2022-06-01 PROCEDURE — 80053 COMPREHEN METABOLIC PANEL: CPT

## 2022-06-01 PROCEDURE — 700102 HCHG RX REV CODE 250 W/ 637 OVERRIDE(OP): Performed by: NURSE PRACTITIONER

## 2022-06-01 PROCEDURE — 700101 HCHG RX REV CODE 250: Performed by: SPECIALIST

## 2022-06-01 PROCEDURE — A9270 NON-COVERED ITEM OR SERVICE: HCPCS | Performed by: NURSE PRACTITIONER

## 2022-06-01 PROCEDURE — 72190 X-RAY EXAM OF PELVIS: CPT

## 2022-06-01 PROCEDURE — 36415 COLL VENOUS BLD VENIPUNCTURE: CPT

## 2022-06-01 PROCEDURE — 81001 URINALYSIS AUTO W/SCOPE: CPT

## 2022-06-01 PROCEDURE — 97530 THERAPEUTIC ACTIVITIES: CPT

## 2022-06-01 PROCEDURE — A9270 NON-COVERED ITEM OR SERVICE: HCPCS | Performed by: SPECIALIST

## 2022-06-01 PROCEDURE — 71045 X-RAY EXAM CHEST 1 VIEW: CPT

## 2022-06-01 PROCEDURE — 85025 COMPLETE CBC W/AUTO DIFF WBC: CPT

## 2022-06-01 PROCEDURE — 76705 ECHO EXAM OF ABDOMEN: CPT

## 2022-06-01 PROCEDURE — 99225 PR SUBSEQUENT OBSERVATION CARE,LEVEL II: CPT | Performed by: NURSE PRACTITIONER

## 2022-06-01 PROCEDURE — 770001 HCHG ROOM/CARE - MED/SURG/GYN PRIV*

## 2022-06-01 PROCEDURE — 700105 HCHG RX REV CODE 258: Performed by: SPECIALIST

## 2022-06-01 PROCEDURE — 99232 SBSQ HOSP IP/OBS MODERATE 35: CPT | Mod: FS | Performed by: NURSE PRACTITIONER

## 2022-06-01 PROCEDURE — 82550 ASSAY OF CK (CPK): CPT

## 2022-06-01 PROCEDURE — 97140 MANUAL THERAPY 1/> REGIONS: CPT

## 2022-06-01 RX ORDER — OXYCODONE HYDROCHLORIDE 5 MG/1
5 TABLET ORAL EVERY 6 HOURS PRN
Status: DISCONTINUED | OUTPATIENT
Start: 2022-06-01 | End: 2022-06-03

## 2022-06-01 RX ORDER — LIDOCAINE 50 MG/G
1-3 PATCH TOPICAL EVERY 24 HOURS
Status: DISCONTINUED | OUTPATIENT
Start: 2022-06-01 | End: 2022-06-07 | Stop reason: HOSPADM

## 2022-06-01 RX ORDER — OXYCODONE HYDROCHLORIDE 10 MG/1
10 TABLET ORAL EVERY 6 HOURS PRN
Status: DISCONTINUED | OUTPATIENT
Start: 2022-06-01 | End: 2022-06-03

## 2022-06-01 RX ORDER — CHOLECALCIFEROL (VITAMIN D3) 125 MCG
5 CAPSULE ORAL NIGHTLY
Status: DISCONTINUED | OUTPATIENT
Start: 2022-06-01 | End: 2022-06-07 | Stop reason: HOSPADM

## 2022-06-01 RX ORDER — HYDROMORPHONE HYDROCHLORIDE 1 MG/ML
0.5 INJECTION, SOLUTION INTRAMUSCULAR; INTRAVENOUS; SUBCUTANEOUS
Status: DISCONTINUED | OUTPATIENT
Start: 2022-06-01 | End: 2022-06-03

## 2022-06-01 RX ORDER — GABAPENTIN 300 MG/1
300 CAPSULE ORAL EVERY 8 HOURS
Status: DISCONTINUED | OUTPATIENT
Start: 2022-06-01 | End: 2022-06-07 | Stop reason: HOSPADM

## 2022-06-01 RX ORDER — LISINOPRIL 10 MG/1
10 TABLET ORAL
Status: DISCONTINUED | OUTPATIENT
Start: 2022-06-01 | End: 2022-06-07 | Stop reason: HOSPADM

## 2022-06-01 RX ADMIN — METOPROLOL TARTRATE 12.5 MG: 25 TABLET, FILM COATED ORAL at 04:40

## 2022-06-01 RX ADMIN — SODIUM CHLORIDE, POTASSIUM CHLORIDE, SODIUM LACTATE AND CALCIUM CHLORIDE: 600; 310; 30; 20 INJECTION, SOLUTION INTRAVENOUS at 04:48

## 2022-06-01 RX ADMIN — Medication 5 MG: at 21:00

## 2022-06-01 RX ADMIN — OXYCODONE HYDROCHLORIDE 10 MG: 10 TABLET ORAL at 17:53

## 2022-06-01 RX ADMIN — GABAPENTIN 300 MG: 300 CAPSULE ORAL at 14:43

## 2022-06-01 RX ADMIN — Medication 1 EACH: at 17:53

## 2022-06-01 RX ADMIN — METAXALONE 800 MG: 800 TABLET ORAL at 12:02

## 2022-06-01 RX ADMIN — DOCUSATE SODIUM 100 MG: 100 CAPSULE, LIQUID FILLED ORAL at 17:54

## 2022-06-01 RX ADMIN — METAXALONE 800 MG: 800 TABLET ORAL at 04:39

## 2022-06-01 RX ADMIN — Medication 1 EACH: at 12:02

## 2022-06-01 RX ADMIN — HYDROMORPHONE HYDROCHLORIDE 0.5 MG: 1 INJECTION, SOLUTION INTRAMUSCULAR; INTRAVENOUS; SUBCUTANEOUS at 23:44

## 2022-06-01 RX ADMIN — GABAPENTIN 100 MG: 100 CAPSULE ORAL at 04:39

## 2022-06-01 RX ADMIN — LIDOCAINE 1 PATCH: 50 PATCH TOPICAL at 04:38

## 2022-06-01 RX ADMIN — RIVAROXABAN 15 MG: 15 TABLET, FILM COATED ORAL at 09:03

## 2022-06-01 RX ADMIN — OXYCODONE HYDROCHLORIDE 10 MG: 10 TABLET ORAL at 21:00

## 2022-06-01 RX ADMIN — OXYCODONE 5 MG: 5 TABLET ORAL at 04:31

## 2022-06-01 RX ADMIN — VENLAFAXINE HYDROCHLORIDE 75 MG: 75 CAPSULE, EXTENDED RELEASE ORAL at 04:39

## 2022-06-01 RX ADMIN — GABAPENTIN 300 MG: 300 CAPSULE ORAL at 21:00

## 2022-06-01 RX ADMIN — METAXALONE 800 MG: 800 TABLET ORAL at 18:00

## 2022-06-01 RX ADMIN — SODIUM CHLORIDE, POTASSIUM CHLORIDE, SODIUM LACTATE AND CALCIUM CHLORIDE: 600; 310; 30; 20 INJECTION, SOLUTION INTRAVENOUS at 23:24

## 2022-06-01 RX ADMIN — OXYCODONE HYDROCHLORIDE 10 MG: 10 TABLET ORAL at 11:58

## 2022-06-01 RX ADMIN — RIVAROXABAN 15 MG: 15 TABLET, FILM COATED ORAL at 17:54

## 2022-06-01 RX ADMIN — LISINOPRIL 10 MG: 10 TABLET ORAL at 12:53

## 2022-06-01 RX ADMIN — SENNOSIDES AND DOCUSATE SODIUM 1 TABLET: 50; 8.6 TABLET ORAL at 21:00

## 2022-06-01 ASSESSMENT — ENCOUNTER SYMPTOMS
NECK PAIN: 0
HEADACHES: 0
NAUSEA: 0
FEVER: 0
ABDOMINAL PAIN: 0
DIZZINESS: 0
FOCAL WEAKNESS: 0
BACK PAIN: 0
MYALGIAS: 1
SPEECH CHANGE: 0
SHORTNESS OF BREATH: 0
CHILLS: 0
TINGLING: 0
CONSTIPATION: 0
COUGH: 0
SENSORY CHANGE: 0

## 2022-06-01 ASSESSMENT — COGNITIVE AND FUNCTIONAL STATUS - GENERAL
MOVING TO AND FROM BED TO CHAIR: A LITTLE
MOBILITY SCORE: 13
CLIMB 3 TO 5 STEPS WITH RAILING: TOTAL
SUGGESTED CMS G CODE MODIFIER MOBILITY: CL
STANDING UP FROM CHAIR USING ARMS: A LITTLE
MOVING FROM LYING ON BACK TO SITTING ON SIDE OF FLAT BED: A LOT
WALKING IN HOSPITAL ROOM: TOTAL
TURNING FROM BACK TO SIDE WHILE IN FLAT BAD: A LITTLE

## 2022-06-01 ASSESSMENT — PAIN DESCRIPTION - PAIN TYPE
TYPE: ACUTE PAIN

## 2022-06-01 ASSESSMENT — GAIT ASSESSMENTS: GAIT LEVEL OF ASSIST: UNABLE TO PARTICIPATE

## 2022-06-01 NOTE — CARE PLAN
The patient is Stable - Low risk of patient condition declining or worsening    Shift Goals  Clinical Goals: Pain management, out of bed activity  Patient Goals: Pain management  Family Goals: No family present    Progress made toward(s) clinical / shift goals:    Problem: Knowledge Deficit - Standard  Goal: Patient and family/care givers will demonstrate understanding of plan of care, disease process/condition, diagnostic tests and medications  Outcome: Progressing     Problem: Pain - Standard  Goal: Alleviation of pain or a reduction in pain to the patient’s comfort goal  Outcome: Progressing  Flowsheets (Taken 6/1/2022 8992)  OB Pain Intervention: Medication - See MAR     Problem: Skin Integrity  Goal: Skin integrity is maintained or improved  Outcome: Progressing

## 2022-06-01 NOTE — PROGRESS NOTES
Report received at bedside from previous shift RN.  Assumed care. Pt in bed. A/O x4. VSS.  Adequate oxygenation noted at room air  Responds appropriately.   Denies pain, SOB, chest pain. Provided non pharmacological interventions for comfort.  Denies N/V, tolerating regular diet  +void, +flatus, last BM 5/31 per patient  Pt WB only for tx on RLE and TTWB on LLE. Patient is OOB to wheelchair and recliner for bathroom and to recliner.  Waffle mattress in place for pressure ulcer prevention.  Assessment complete.   Discussed POC, pt verbalizes understanding.  Explained importance of calling before getting OOB.   Call light and belongings within reach. Bed alarm off. Bed in the lowest position. Treaded socks in place. Hourly rounding in progress.   Will continue to monitor, all neds met

## 2022-06-01 NOTE — THERAPY
"Physical Therapy   Daily Treatment     Patient Name: Joseph Schwab  Age:  36 y.o., Sex:  male  Medical Record #: [id number]  Today's Date: 6/1/2022     Precautions  Precautions: Toe Touch Weight Bearing Left Lower Extremity (WBAT RLE for transfers only)  Comments: LLE brace/boot PRN for comfort    Assessment    Patient progressing with functional mobility. He reported intractable pain overnight but this appeared improved while therapist was in room. He mobilized as detailed below. He required verbal and tactile cueing for TTWB LLE. Provided extensive education regarding acute healing process, pain management strategies, reason for WB precautions and importance of adherence, and safe progression to PLOF; patient verbalized understanding but would likely benefit from reinforcement. Provided manual therapy to trigger points in L periscapular area, patient reported some relief of radiating (\"shooting, electrical\") pain following intervention. Will continue to follow. Recommend patient mobilize to WC/recliner 3x/day at meal times with RN staff.    Plan    Continue current treatment plan.    DC Equipment Recommendations: Ramp, Wheelchair (18\" reclining WC with elevating legrests, removable armrests, pressure relief cushion)  Discharge Recommendations: Recommend post-acute placement for additional physical therapy services prior to discharge home (with continued progress in house may reach safe level for home at  level)      Subjective    \"I had a horrible night, my pain was 10/10 all night.\"     Objective       06/01/22 1108   Charge Group   Charges  Yes   PT Therapeutic Activities 2  (30 min)   PT Manual Therapy 1  (8 min)   Precautions   Precautions Toe Touch Weight Bearing Left Lower Extremity  (WBAT RLE for transfers only)   Comments LLE brace/boot PRN for comfort   Vitals   O2 (LPM) 0   O2 Delivery Device None - Room Air   Pain 0 - 10 Group   Location Shoulder;Pelvis   Location Orientation Left   Therapist Pain " Assessment During Activity;Nurse Notified   Cognition    Cognition / Consciousness WDL   Level of Consciousness Alert   Comments pleasant, cooperative. disconjugate gaze or decreased tracking to R with therapist in R visual field. anxious and distracted by pain, this improved following education. somewhat self limiting   Balance   Sitting Balance (Static) Fair   Sitting Balance (Dynamic) Fair   Standing Balance (Static) Fair -   Standing Balance (Dynamic) Fair -   Weight Shift Sitting Good   Weight Shift Standing Fair  (utilizing BUE and RLE)   Skilled Intervention Verbal Cuing;Compensatory Strategies;Tactile Cuing   Comments FWW for transfer, no LOB   Gait Analysis   Gait Level Of Assist Unable to Participate   Weight Bearing Status TTWB LLE, WBAT RLE for transfers only   Bed Mobility    Supine to Sit Supervised  (used bed features but appears capable without)   Sit to Supine   (NT, left in WC)   Scooting Supervised  (seated)   Skilled Intervention Verbal Cuing;Compensatory Strategies;Sequencing   Functional Mobility   Sit to Stand Contact Guard Assist   Bed, Chair, Wheelchair Transfer Contact Guard Assist   Transfer Method Stand Pivot   Wheelchair Assist Supervised   Distance Wheelchair (Feet or Distance)   (in room but patient observed in unit after session with wife managing IV pole)   Skilled Intervention Verbal Cuing;Compensatory Strategies;Sequencing   How much difficulty does the patient currently have...   Turning over in bed (including adjusting bedclothes, sheets and blankets)? 3   Sitting down on and standing up from a chair with arms (e.g., wheelchair, bedside commode, etc.) 2   Moving from lying on back to sitting on the side of the bed? 3   How much help from another person does the patient currently need...   Moving to and from a bed to a chair (including a wheelchair)? 3   Need to walk in a hospital room? 1   Climbing 3-5 steps with a railing? 1   6 clicks Mobility Score 13   Activity Tolerance  "  Sitting in Chair left in WC   Sitting Edge of Bed 15 min   Standing for transfer only   Comments limited by pain   Patient / Family Goals    Patient / Family Goal #1 \"I want to be able to get back to being independent, that is important to me\"   Goal #1 Outcome Goal not met   Short Term Goals    Short Term Goal # 1 Pt will complete all aspects of bed mobility with min A within 6 visits in order to improve independence with bed mobility   Goal Outcome # 1 Goal met, new goal added   Short Term Goal # 1 B  Patient will move supine<>sitting EOB without bed features with supervision within 6tx in order to get in/out of bed   Short Term Goal # 2 Pt will complete all aspects of standing transfers with LRAD with 100% compliance with TTWB Lt LE within 6 visits with supv in order to improve transfer training   Goal Outcome # 2 Progressing as expected   Short Term Goal # 3 Pt will self propel w/c using B UE's to distances of 300' or greater with supv within 6 weeks in order to propel wc at community level distances   Goal Outcome # 3 Goal met  (observed following session)   Education Group   Weight Bearing Status Patient Response Patient;Acceptance;Explanation;Demonstration;Verbal Demonstration;Action Demonstration;Reinforcement Needed   Additional Comments extensive education regarding pain management techniques and reason for WB precautions   Anticipated Discharge Equipment and Recommendations   DC Equipment Recommendations Ramp;Wheelchair  (18\" reclining WC with elevating legrests, removable armrests, pressure relief cushion)   Discharge Recommendations Recommend post-acute placement for additional physical therapy services prior to discharge home  (with continued progress in house may reach safe level for home at WC level)   Interdisciplinary Plan of Care Collaboration   IDT Collaboration with  Nursing;Family / Caregiver   Patient Position at End of Therapy Seated;Call Light within Reach;Tray Table within Reach;Family / " Friend in Room   Collaboration Comments [facility]   Session Information   Date / Session Number  6/1-2 (2/4, 6/5)

## 2022-06-01 NOTE — PROGRESS NOTES
"   Orthopaedic Progress Note    Interval changes:  Patient doing well  Cleared for DC by ortho pending trauma clearance    ROS - Patient denies any new issues.  Pain well controlled.    BP (!) 142/96   Pulse 82   Temp 36.5 °C (97.7 °F) (Temporal)   Resp 18   Ht 1.905 m (6' 3\")   Wt 76.8 kg (169 lb 5 oz)   SpO2 96%       Patient seen and examined  No acute distress  Breathing non labored  RRR  Pelvic dressings CDI, DNVI, moves all toes, cap refill <2 sec.     Recent Labs     05/30/22  0038 05/31/22  0446 06/01/22  0505   WBC 11.9* 11.9* 17.3*   RBC 2.36* 2.76* 3.02*   HEMOGLOBIN 7.4* 8.6* 9.4*   HEMATOCRIT 21.4* 25.6* 27.4*   MCV 90.7 92.8 90.7   MCH 31.4 31.2 31.1   MCHC 34.6 33.6* 34.3   RDW 45.5 46.1 45.3   PLATELETCT 127* 192 262   MPV 10.3 10.1 9.8       Active Hospital Problems    Diagnosis    • Closed fracture of multiple ribs of both sides [S22.43XA]      Priority: High   • Contusion of both lungs [S27.322A]      Priority: High   • Leukocytosis [D72.829]      Priority: High   • Acute deep vein thrombosis (DVT) of popliteal vein of left lower extremity (HCC) [I82.432]      Priority: High   • Closed bilateral fracture of pubic rami (HCC) [S32.591A, S32.592A]      Priority: High   • Splenic laceration, initial encounter [S36.039A]      Priority: High   • Liver laceration, initial encounter [S36.113A]      Priority: High   • Primary hypertension [I10]      Priority: Medium   • Acute blood loss anemia [D62]      Priority: Medium   • Ankle fracture, left, closed, initial encounter [S82.892A]      Priority: Medium   • Closed fracture of transverse process of lumbar vertebra (HCC) [S32.009A]      Priority: Medium   • Discharge planning issues [Z02.9]      Priority: Low   • Trauma [T14.90XA]      Priority: Low   • Anxiety [F41.9]      Priority: Low       Assessment/Plan:  Patient doing well  Cleared for DC by ortho pending trauma clearance  POD#5 S/P:   1.  Percutaneous skeletal fixation of left sacroiliac joint " fracture dislocation through first sacral segment.  2.  Percutaneous skeletal fixation of left sacroiliac joint fracture dislocation through second sacral segment.  3.  Percutaneous skeletal fixation of left anterior pelvic fractures.  4.  Percutaneous skeletal fixation of right anterior pelvic fractures.   Wt bearing status - TTWB LLE and transfers only RLE for 6-8 weeks   Wound care/Drains - Dressings to be changed every other day by nursing  Future Procedures - none planned   Sutures/Staples out- 14 days post operatively  PT/OT-initiated  Antibiotics:  Perioperative completed  DVT Prophylaxis- TEDS/SCDs/Foot pumps  Olmos-none  Case Coordination for Discharge Planning - Disposition home vs SNF

## 2022-06-01 NOTE — DISCHARGE PLANNING
"Case Management Discharge Planning    Admission Date: 5/26/2022  GMLOS: 7.7  ALOS: 5    6-Clicks ADL Score: 18  6-Clicks Mobility Score: 13  PT and/or OT Eval ordered: Yes  Post-acute Referrals Ordered: Yes  Post-acute Choice Obtained: Yes  Has referral(s) been sent to post-acute provider: Yes    Anticipated Discharge Dispo:    Home with Close OP Follow Up     DME Needed: Pending PT/OT eval notes    Action(s) Taken:     Per Savannah Osorio's notes of [facility] on 5/31, \" Per [facility] PAR - Unfortunately, patient does not have insurance benefit for IRF level care.\"     Per PT today\" Recommend post-acute placement for additional physical therapy services prior to discharge home (with continued progress in house may reach safe level for home at  level)\"       Escalations Completed: None    Medically Clear: No    Next Steps:   This RN CM to continue to assist Pt with discharge as needed    Barriers to Discharge:   Pending medical clearance  Pt has no IRF benefits      Is the patient up for discharge tomorrow: No        "

## 2022-06-01 NOTE — CARE PLAN
The patient is Stable - Low risk of patient condition declining or worsening    Shift Goals  Clinical Goals: pain management/mobility  Patient Goals: pain management/comfort  Family Goals: No family present    Progress made toward(s) clinical / shift goals:    Problem: Knowledge Deficit - Standard  Goal: Patient and family/care givers will demonstrate understanding of plan of care, disease process/condition, diagnostic tests and medications  Outcome: Progressing  Note: Patient verbalizes understanding of POC     Problem: Pain - Standard  Goal: Alleviation of pain or a reduction in pain to the patient’s comfort goal  Outcome: Progressing  Note: Patient medicated per MAR, provided non pharmacological interventions for comfort     Problem: Skin Integrity  Goal: Skin integrity is maintained or improved  Outcome: Progressing  Note: Skin integrity maintained, patient mobilized/ambulated       Patient is not progressing towards the following goals:

## 2022-06-01 NOTE — PROGRESS NOTES
Sanjuana from Lab called with critical result of CPK at 2575. Critical lab result read back to Sanjuana. Per MD order, no notification needed.
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
## 2022-06-01 NOTE — PROGRESS NOTES
"Bedside report received from previous shift RN and assumed full care of patient.  Assessments complete as per flowsheets.    Alert & Oriented x4.   Patient denies chest pain/shortness of breath.  Patient reports 10/10 pain.   Patient stated that current pain control regimen isn't touching his pain and he's requesting it be changed.  Patient stated that he's in so much pain that he needs help controlling it before he \"does something\".  This RN completed a [place] Screen as a result - negative.  Trauma FADI Salvadorn notified via VOALTE.    Medication given per MAR.     Patient tolerating diet, currently denies nausea and vomiting.    Patient ambulating w/ front wheeled walker/wheelchair to toilet.  Currently WBAT on RLE and TTWB on LLE.    All patient needs have been met at this time, call light within reach.  Reinforced falls program rationale with patient.   Verified bed is in low and locked position, non-skid socks in place.    Hourly rounding in place.  "

## 2022-06-01 NOTE — NON-PROVIDER
This note is intended for the purposes of medical student education and feedback only.   Please refer to the documentation by this patient's assigned medical practitioner for details of care and plans.    Reason for admission: Pt was admitted 5/26/22 following a [facility] with multiple injuries including b/l rib fxs, pulmonary contusion, small left apical pneumothorax, grade 2 liver injury, grade 3 spleen injury, unstable closed pelvic fx, L4/5 transverse process fx, left ankle fx, and acute left popliteal DVT.     HD/POD#:   POD #5 from ORIF pelvis   POD #3 following IVC filter placement.     SUBJECTIVE  Pt reports a rough night and morning. Has been suffering from increasing pain described as sharp in the left chest and a tingling, pulling pain that begins between the scapula and runs along his spine. He is also noting muscle spasms of his back. Lidoderm patch was no relief to back pain. He is still eating 100% of his meals, no BMs so far today after 2 yesterday. Voiding without issue. Pt notes feeling a little down about his current condition today.     OBJECTIVE   Vital Signs:  Temp:  [36.5 °C (97.7 °F)-37.2 °C (99 °F)] 36.5 °C (97.7 °F)  Pulse:  [82-94] 82  Resp:  [18] 18  BP: (130-143)/(85-99) 142/96  SpO2:  [93 %-96 %] 96 % on room air    Physical Exam:  General: Pt laying in bed, in moderate pain throughout encounter. Teary when talking about having a difficult time.  HEENT: NC, AT. Strabismus (exotropia) noted of left eye, EOMI, no scleral icterus. Pinna intact b/l. MMM  Cardiovascular: RRR, no m/r/g  Chest: TTP of left anterior chest  Respiratory: Normal work of breathing. CTAB b/l, no wheezing or crackles appreciated  Abdominal exam: Diffuse TTP noted lower>upper abd. +BS. Groin and hip dressing cdi. Mild distention noted. Negative ny's sign, non-tender to palpation in RUQ and LUQ.   Extremities:  Moving upper extremities spontaneously. Can move all joints from the knee and inferiorly without pain.    Neurological: AxOx4, appropriate throughout exam. CN's 2-12 grossly intact.   Skin: without jaundice. Dark purple bruises noted from the pubic bone involving the medial aspect of the leg down to the knee. Dark purple bruise located at the medial aspect of the upper arm.     Ins/Outs:    Intake/Output Summary (Last 24 hours) at 6/1/2022 0926  Last data filed at 5/31/2022 2104  Gross per 24 hour   Intake 600 ml   Output 900 ml   Net -300 ml       Lab Results:  Recent Labs     05/30/22  0038 05/31/22  0446 06/01/22  0505   WBC 11.9* 11.9* 17.3*   RBC 2.36* 2.76* 3.02*   HEMOGLOBIN 7.4* 8.6* 9.4*   HEMATOCRIT 21.4* 25.6* 27.4*   MCV 90.7 92.8 90.7   MCH 31.4 31.2 31.1   MCHC 34.6 33.6* 34.3   RDW 45.5 46.1 45.3   PLATELETCT 127* 192 262   MPV 10.3 10.1 9.8     Recent Labs     05/30/22  0038 05/31/22  0446 06/01/22  0505   SODIUM 140 140 139   POTASSIUM 3.6 3.2* 3.4*   CHLORIDE 102 102 101   CO2 27 28 25   GLUCOSE 109* 121* 113*   BUN 8 9 9   CREATININE 0.50 0.57 0.56   CALCIUM 8.6 8.7 9.2         Recent Labs     05/30/22  0038 05/31/22  0446 06/01/22  0505   ASTSGOT 155* 151* 157*   ALTSGPT 100* 118* 152*   TBILIRUBIN 1.1 1.6* 2.4*   ALKPHOSPHAT 118* 158* 205*   GLOBULIN 2.3 2.4 2.6       Imaging Results:  CXR 6/1/22    FINDINGS:        Cardiomediastinal silhouette is stable.     Left pleural thickening again noted. There is hazy retrocardiac left basilar opacity which could be atelectasis, contusion and/or developing pneumonitis. No significant pneumothorax.     Bones are unchanged.     IMPRESSION:     No significant interval change.    ASSESSMENT/PLAN  Mr Schwab is a very pleasant 35yo male who presented to the [facility] ED bib care flight from [facility].  was found to have many injuries including b/l rib fxs, pulmonary contusion, small left apical pneumothorax, grade 2 liver injury, grade 3 spleen injury, unstable closed pelvic fx, L4/5 transverse process fx, left ankle fx, and acute left popliteal DVT. He is  reporting poor pain control today with increased left chest pain, back pain, and muscle spasms. He also notes his mood is more depressed today. His Alk Phos, Tbili, and transaminase levels all increased today and could be from liver injury from trauma or tylenol. His bili specifically could be from severe bruising he has. His CPK has decreased by ~300 points.    #Pubic fracture  5/27 ORIF  5/28 CT cystogram negative for bladder leak. Olmos removed 5/29 and pt is now urinating without issue and denies catie blood in urine. Weight bearing status toe touch on the left, and weight bearing on the right side for transfers only. PT/OT consult done yesterday and would like to initiate therapy 4x/week.      #Splenic laceration, grade 3  #Liver laceration, grade 2  #Transaminitis  #Elevated Alkaline Phosphatase  Small hematoma noted at spleen, no liver hematoma. No TTP in LUQ. Continue serial abdominal exams and lab studies. Hgb improved from yesterday. TBili is continues to rise now at 2.4. Alk Phos 205 has been increasing the past few days. Transaminitis also present. Non tender in RUQ and murphys negative on exam.   - Trend Transaminases, Tbili, Direct bili, Alk Phos, and GGT  - U/S RUQ pending  - Consider replacing tylenol with other treatment (Celebrex, Mobic)     #Rib fractures, multiple  #b/l lung contusions  Left posterior ribs 2-5 & 7-11. 3-8 rib flail segment. Contusions L>R. Continue IS and pulmonary hygiene. Pt reports less sputum production that is more clear and less dark brown. Pt has been tolerating RA for 2 days now.   - Continue pain management with scheduled tylenol and PRN Nancy.   - Continue aggressive pulmonary Hygiene.   - Maintain O2 saturation >92%.      #DVT of popliteal vein  IVC filter placed by IR 5/29/22. Hgb continues to increase. Initial contraindication for anticoagulation. Pt now appropriate for anticoagulation with DOAC  - Rivaroxaban 15mg BID  - CTM Hgb, vital signs and serial abdominal  exams.   - Will need outpatient f/u with anticoagulation clinic upon d/c.     #Acute blood loss anemia  Hgb 8.4, up from 8.6 day prior. Pt denies lightheadedness and dizziness.  - CTM Hgb with CBC and vital signs.   - Transfuse if Hgb <7 (A+ blood type) or at <8 if symptomatic     #Closed ankle fracture, left  Ossific density adjacent to the medial malleolus, appearance suggests small ligaments avulsion fracture fragment.  Non-operative management.  Weight bearing status - Touch toe weightbearing LLE, left ankle brace or boot as needed for comfort.  Manolo Han MD. Orthopedic Surgeon. [facility].     #Closed fracture L4-5 transverse process  NSG consultation not indicated, pain control as treatment.      #Hypokalemia  Mild at 3.4, asymptomatic. Improved from yesterday without treatment, continue full diet to replenish.   - CMP to evaluate tomorrow      PROPHYLAXIS  • DVT: SCDs, IVC filter, Rivaroxaban initiated 15mg BID 5/31/22pm

## 2022-06-02 ENCOUNTER — APPOINTMENT (OUTPATIENT)
Dept: RADIOLOGY | Facility: MEDICAL CENTER | Age: 37
DRG: 957 | End: 2022-06-02
Payer: COMMERCIAL

## 2022-06-02 LAB
ALBUMIN SERPL BCP-MCNC: 3.7 G/DL (ref 3.2–4.9)
ALBUMIN/GLOB SERPL: 1.5 G/DL
ALP SERPL-CCNC: 206 U/L (ref 30–99)
ALT SERPL-CCNC: 148 U/L (ref 2–50)
AMMONIA PLAS-SCNC: 14 UMOL/L (ref 11–45)
ANION GAP SERPL CALC-SCNC: 10 MMOL/L (ref 7–16)
AST SERPL-CCNC: 131 U/L (ref 12–45)
BASOPHILS # BLD AUTO: 0.3 % (ref 0–1.8)
BASOPHILS # BLD: 0.04 K/UL (ref 0–0.12)
BILIRUB SERPL-MCNC: 2 MG/DL (ref 0.1–1.5)
BUN SERPL-MCNC: 9 MG/DL (ref 8–22)
CALCIUM SERPL-MCNC: 9 MG/DL (ref 8.5–10.5)
CHLORIDE SERPL-SCNC: 102 MMOL/L (ref 96–112)
CK SERPL-CCNC: 1269 U/L (ref 0–154)
CO2 SERPL-SCNC: 28 MMOL/L (ref 20–33)
CREAT SERPL-MCNC: 0.55 MG/DL (ref 0.5–1.4)
EOSINOPHIL # BLD AUTO: 0.52 K/UL (ref 0–0.51)
EOSINOPHIL NFR BLD: 3.5 % (ref 0–6.9)
ERYTHROCYTE [DISTWIDTH] IN BLOOD BY AUTOMATED COUNT: 49.6 FL (ref 35.9–50)
GFR SERPLBLD CREATININE-BSD FMLA CKD-EPI: 132 ML/MIN/1.73 M 2
GLOBULIN SER CALC-MCNC: 2.5 G/DL (ref 1.9–3.5)
GLUCOSE SERPL-MCNC: 122 MG/DL (ref 65–99)
HCT VFR BLD AUTO: 27.5 % (ref 42–52)
HGB BLD-MCNC: 9.1 G/DL (ref 14–18)
IMM GRANULOCYTES # BLD AUTO: 0.45 K/UL (ref 0–0.11)
IMM GRANULOCYTES NFR BLD AUTO: 3 % (ref 0–0.9)
LYMPHOCYTES # BLD AUTO: 2.3 K/UL (ref 1–4.8)
LYMPHOCYTES NFR BLD: 15.5 % (ref 22–41)
MCH RBC QN AUTO: 31.7 PG (ref 27–33)
MCHC RBC AUTO-ENTMCNC: 33.1 G/DL (ref 33.7–35.3)
MCV RBC AUTO: 95.8 FL (ref 81.4–97.8)
MONOCYTES # BLD AUTO: 2.09 K/UL (ref 0–0.85)
MONOCYTES NFR BLD AUTO: 14.1 % (ref 0–13.4)
NEUTROPHILS # BLD AUTO: 9.45 K/UL (ref 1.82–7.42)
NEUTROPHILS NFR BLD: 63.6 % (ref 44–72)
NRBC # BLD AUTO: 0 K/UL
NRBC BLD-RTO: 0 /100 WBC
PLATELET # BLD AUTO: 261 K/UL (ref 164–446)
PMV BLD AUTO: 9.5 FL (ref 9–12.9)
POTASSIUM SERPL-SCNC: 4.2 MMOL/L (ref 3.6–5.5)
PROT SERPL-MCNC: 6.2 G/DL (ref 6–8.2)
RBC # BLD AUTO: 2.87 M/UL (ref 4.7–6.1)
SODIUM SERPL-SCNC: 140 MMOL/L (ref 135–145)
WBC # BLD AUTO: 14.9 K/UL (ref 4.8–10.8)

## 2022-06-02 PROCEDURE — 82140 ASSAY OF AMMONIA: CPT

## 2022-06-02 PROCEDURE — 700101 HCHG RX REV CODE 250: Performed by: SPECIALIST

## 2022-06-02 PROCEDURE — A9270 NON-COVERED ITEM OR SERVICE: HCPCS | Performed by: NURSE PRACTITIONER

## 2022-06-02 PROCEDURE — 700101 HCHG RX REV CODE 250: Performed by: NURSE PRACTITIONER

## 2022-06-02 PROCEDURE — 700102 HCHG RX REV CODE 250 W/ 637 OVERRIDE(OP): Performed by: SPECIALIST

## 2022-06-02 PROCEDURE — 97535 SELF CARE MNGMENT TRAINING: CPT

## 2022-06-02 PROCEDURE — 36415 COLL VENOUS BLD VENIPUNCTURE: CPT

## 2022-06-02 PROCEDURE — 85025 COMPLETE CBC W/AUTO DIFF WBC: CPT

## 2022-06-02 PROCEDURE — 99232 SBSQ HOSP IP/OBS MODERATE 35: CPT | Mod: FS

## 2022-06-02 PROCEDURE — 700105 HCHG RX REV CODE 258: Performed by: SPECIALIST

## 2022-06-02 PROCEDURE — 71045 X-RAY EXAM CHEST 1 VIEW: CPT

## 2022-06-02 PROCEDURE — 700102 HCHG RX REV CODE 250 W/ 637 OVERRIDE(OP): Performed by: NURSE PRACTITIONER

## 2022-06-02 PROCEDURE — 80053 COMPREHEN METABOLIC PANEL: CPT

## 2022-06-02 PROCEDURE — A9270 NON-COVERED ITEM OR SERVICE: HCPCS | Performed by: SPECIALIST

## 2022-06-02 PROCEDURE — 770001 HCHG ROOM/CARE - MED/SURG/GYN PRIV*

## 2022-06-02 PROCEDURE — 700111 HCHG RX REV CODE 636 W/ 250 OVERRIDE (IP): Performed by: NURSE PRACTITIONER

## 2022-06-02 PROCEDURE — 700111 HCHG RX REV CODE 636 W/ 250 OVERRIDE (IP): Performed by: SPECIALIST

## 2022-06-02 PROCEDURE — 82550 ASSAY OF CK (CPK): CPT

## 2022-06-02 RX ADMIN — GABAPENTIN 300 MG: 300 CAPSULE ORAL at 17:09

## 2022-06-02 RX ADMIN — RIVAROXABAN 15 MG: 15 TABLET, FILM COATED ORAL at 06:36

## 2022-06-02 RX ADMIN — SODIUM CHLORIDE, POTASSIUM CHLORIDE, SODIUM LACTATE AND CALCIUM CHLORIDE: 600; 310; 30; 20 INJECTION, SOLUTION INTRAVENOUS at 23:26

## 2022-06-02 RX ADMIN — OXYCODONE 5 MG: 5 TABLET ORAL at 17:09

## 2022-06-02 RX ADMIN — HYDROMORPHONE HYDROCHLORIDE 0.5 MG: 1 INJECTION, SOLUTION INTRAMUSCULAR; INTRAVENOUS; SUBCUTANEOUS at 20:55

## 2022-06-02 RX ADMIN — LISINOPRIL 10 MG: 10 TABLET ORAL at 04:59

## 2022-06-02 RX ADMIN — POLYETHYLENE GLYCOL 3350 1 PACKET: 17 POWDER, FOR SOLUTION ORAL at 04:59

## 2022-06-02 RX ADMIN — ONDANSETRON 4 MG: 4 TABLET, ORALLY DISINTEGRATING ORAL at 18:25

## 2022-06-02 RX ADMIN — POLYETHYLENE GLYCOL 3350 1 PACKET: 17 POWDER, FOR SOLUTION ORAL at 17:10

## 2022-06-02 RX ADMIN — SENNOSIDES AND DOCUSATE SODIUM 1 TABLET: 50; 8.6 TABLET ORAL at 20:55

## 2022-06-02 RX ADMIN — DOCUSATE SODIUM 100 MG: 100 CAPSULE, LIQUID FILLED ORAL at 04:59

## 2022-06-02 RX ADMIN — GABAPENTIN 300 MG: 300 CAPSULE ORAL at 04:58

## 2022-06-02 RX ADMIN — LIDOCAINE 3 PATCH: 50 PATCH TOPICAL at 04:59

## 2022-06-02 RX ADMIN — VENLAFAXINE HYDROCHLORIDE 75 MG: 75 CAPSULE, EXTENDED RELEASE ORAL at 04:58

## 2022-06-02 RX ADMIN — Medication 1 EACH: at 04:59

## 2022-06-02 RX ADMIN — DOCUSATE SODIUM 100 MG: 100 CAPSULE, LIQUID FILLED ORAL at 17:10

## 2022-06-02 RX ADMIN — Medication 5 MG: at 20:55

## 2022-06-02 RX ADMIN — METAXALONE 800 MG: 800 TABLET ORAL at 11:10

## 2022-06-02 RX ADMIN — OXYCODONE HYDROCHLORIDE 10 MG: 10 TABLET ORAL at 23:58

## 2022-06-02 RX ADMIN — SODIUM CHLORIDE, POTASSIUM CHLORIDE, SODIUM LACTATE AND CALCIUM CHLORIDE: 600; 310; 30; 20 INJECTION, SOLUTION INTRAVENOUS at 06:37

## 2022-06-02 RX ADMIN — METAXALONE 800 MG: 800 TABLET ORAL at 17:09

## 2022-06-02 RX ADMIN — OXYCODONE HYDROCHLORIDE 10 MG: 10 TABLET ORAL at 09:39

## 2022-06-02 RX ADMIN — RIVAROXABAN 15 MG: 15 TABLET, FILM COATED ORAL at 17:09

## 2022-06-02 RX ADMIN — OXYCODONE HYDROCHLORIDE 10 MG: 10 TABLET ORAL at 02:56

## 2022-06-02 RX ADMIN — HYDROMORPHONE HYDROCHLORIDE 0.5 MG: 1 INJECTION, SOLUTION INTRAMUSCULAR; INTRAVENOUS; SUBCUTANEOUS at 12:20

## 2022-06-02 RX ADMIN — METAXALONE 800 MG: 800 TABLET ORAL at 04:59

## 2022-06-02 RX ADMIN — GABAPENTIN 300 MG: 300 CAPSULE ORAL at 20:55

## 2022-06-02 ASSESSMENT — ENCOUNTER SYMPTOMS
TINGLING: 0
NECK PAIN: 0
MYALGIAS: 1
ROS GI COMMENTS: LAST BM 6/1
COUGH: 0
BACK PAIN: 0
DIZZINESS: 0
FOCAL WEAKNESS: 0
CHILLS: 0
FEVER: 0
SENSORY CHANGE: 0
SPEECH CHANGE: 0
SHORTNESS OF BREATH: 0
NAUSEA: 0
ABDOMINAL PAIN: 0
HEADACHES: 0

## 2022-06-02 ASSESSMENT — PAIN DESCRIPTION - PAIN TYPE
TYPE: ACUTE PAIN

## 2022-06-02 ASSESSMENT — COGNITIVE AND FUNCTIONAL STATUS - GENERAL
DAILY ACTIVITIY SCORE: 21
TOILETING: A LITTLE
SUGGESTED CMS G CODE MODIFIER DAILY ACTIVITY: CJ
HELP NEEDED FOR BATHING: A LITTLE
DRESSING REGULAR LOWER BODY CLOTHING: A LITTLE

## 2022-06-02 NOTE — FACE TO FACE
Face to Face Note  -  Durable Medical Equipment    Liz Tapia D.N.P. - NPI: [id number]  I certify that this patient is under my care and that they had a durable medical equipment(DME)face to face encounter by myself that meets the physician DME face-to-face encounter requirements with this patient on:    Date of encounter:   Patient:                    MRN:                       YOB: 2021  Joseph Schwab  [id number]  1985     The encounter with the patient was in whole, or in part, for the following medical condition, which is the primary reason for durable medical equipment:  Other - Trauma    I certify that, based on my findings, the following durable medical equipment is medically necessary:  Wheel Chair.    My Clinical findings support the need for the above equipment due to:  Abnormal Gait    Supporting Symptoms: Physical and occupational therapy recommendations

## 2022-06-02 NOTE — DISCHARGE PLANNING
"Agency/Facility Name: [facility]   Spoke To: Rob  Outcome: Per Rob they currently only have a 18\" wheel chair with elevating legrests. Per Rob, there is currently a supply shortage for 18\" reclining WC with elevating legrests, removable armrests.    @1337-  Agency/Facility Name: [facility]   Outcome: Left voicemail notifiying agency that pt will take wheel chair with elevating legrests per RN REESE Eid .     Received Choice form at 1333  Agency/Facility Name: 1) [facility] 2) [facility]  Referral not sent, pending DME order.     Choice saved in .      @1341-  Agency/Facility Name: [facility]   Spoke To: Rob   Outcome: Per Rob, he will bring over wheel chair once referral is sent.     @1442-  [facility] sending DME referral via [employer] to [facility].  "

## 2022-06-02 NOTE — PROGRESS NOTES
Report received from RN, assumed care at 1845  Pt is A0x4, and responds appropriately  Pt declines any SOB, chest pain, new onset of numbness/tingling  Pt rates pain at 8/10, on a scale of 1-10, pt medicated per MAR  Pt is voiding adequately and without hesitancy  Pt has + flatus, + bowel sounds, + BM on 6/1  Pt transfers with x1 assist with FWW to wheelchair. WBAT to RLE, TTWB to LLE  Pt is tolerating a regular diet, denies any nausea/vomiting  Plan of care discussed, all questions answered. Explained importance of calling before getting OOB and pt verbalized understanding. Explained importance of oral care. Call light is within reach, treaded slipper socks on, bed in lowest/locked position, hourly rounding in place, all needs met at this time.

## 2022-06-02 NOTE — PROGRESS NOTES
36yoM with unstable pelvic ring fx/dislocation s/p percutaneous fixation 5/27.  Left medial malleolus avulsion fx.      S: doing okay, states mobility is improving    O:    Vitals:    06/02/22 0716   BP: (!) 148/104   Pulse: 87   Resp: 16   Temp: 36.9 °C (98.5 °F)   SpO2: 93%     Exam:  General-NAD, alert and following commands   BLE- +EHL/FHL/TA/GS Motor, SILT diffusely in toes, BCR in toes    A: 36yoM with unstable pelvic ring fx/dislocation s/p percutaneous fixation 5/27.  Left medial malleolus avulsion fx.  Xrays from 5/31 of pelvis overall show stable alignment, questionable slight (<3mm) increased widening left SI joint compared to right.  Repeat xrays from 6/1 are stable.    Recs:  --TTWB LLE x 6-8 weeks postop  --WB RLE for transfers only  --left ankle brace or boot PRN comfort when OOB  --PT/OT for mobilization/transfer training  --continue lovenox and SCDs  --fu 2 weeks postop

## 2022-06-02 NOTE — PROGRESS NOTES
I called Jos Oneal as he left a message late yesterday after we had reached out to him to discuss options at discharge related to palliative care. Will await his phone call back today. 1115: I called Karma Forbes at [phone], there was no answer.  I left Pt A&Ox4  Pain managed with prescribed medications  Tolerating diet, denies n/v. +bowel sounds, + flatus, LBM 6/1  +void  Saturating >90% on room air   Denies SOB  Pt ambulates x1 assistance, patient able to use walker to transfer from bed to chair and/or wheelchair   Updated on plan of care. Safety education provided. Bed locked in low. Call light within reach. Rounding in place.

## 2022-06-02 NOTE — THERAPY
"Occupational Therapy  Daily Treatment     Patient Name: Joseph Schwab  Age:  36 y.o., Sex:  male  Medical Record #: [id number]  Today's Date: 6/2/2022     Precautions: Fall Risk, Toe Touch Weight Bearing Left Lower Extremity  Comments: RLE WBAT for transfers only; LLE CAM boot for comfort    Assessment    Pt seen for OT tx with emphasis on education. SO present. Pt up to recliner. Reports he is transferring to/from WC and toilet with SO assist and no difficulty. Educated both on management of WC features, safe loading of WC in/out of vehicle. Pt reports temporary ramp is in place. Shower seat at home is built-in bench. Pt reports shower entry is wide and WC can be positioned right at entry. Educated on bumping front casters over shower threshold if needed to ensure safe pivot to shower bench (with supv from SO). Recommend pt obtain long-handled reacher for object retrieval. Pt able to demo doff/don B socks without use of AE. Pt is progressing well towards OT goals. Post-acute transitional care no longer indicated. If remains in-house, will benefit from continued acute OT to maximize functional independence and safety.     Plan    Continue current treatment plan.    DC Equipment Recommendations: None  Discharge Recommendations: Recommend home health for continued occupational therapy services (if possible, however pt is unfunded)    Subjective    \"I'm moving much better.\"     Objective       06/02/22 1256   Balance   Sitting Balance (Static) Fair +   Sitting Balance (Dynamic) Fair   Weight Shift Sitting Good   Comments standing NT due to recently mobilized   Activities of Daily Living   Lower Body Dressing Supervision  (doff/don B socks without AE)   Functional Mobility   Comments Pt declined mobility due to recently mobilized   Short Term Goals   Short Term Goal # 1 Pt will complete ADL transfers with supv and consistent adherence to TTWB LLE   Goal Outcome # 1   (declined demo this session; reports completing without " difficulty)   Short Term Goal # 2 Pt will complete LB dressing using AE with supv   Goal Outcome # 2 Progressing as expected   Short Term Goal # 3 Pt will complete toileting with supv   Goal Outcome # 3   (NT this session)

## 2022-06-02 NOTE — DISCHARGE PLANNING
Case Management Discharge Planning    Admission Date: 5/26/2022  GMLOS: 7.7  ALOS: 6    6-Clicks ADL Score: 18  6-Clicks Mobility Score: 13  PT and/or OT Eval ordered: Yes  Post-acute Referrals Ordered: Yes  Post-acute Choice Obtained: Yes  Has referral(s) been sent to post-acute provider: Yes    Anticipated Discharge Dispo:    Home with Close OP Follow Up     DME Needed: Per PT . Pt needs  a wheelchair and a FWW    Action(s)   This RN CM sent a message to iLsa APONTE  that  PT recommends a wheelchair for Pt . Wheelchair Specs is in PT notes.    This RN CM forwarded the message to Liz APONTE.    This RN CM requested RN Sharon to order Pt's FWW with Traction.    This RN CM met with Pt and his SO Dante at bedside. Thre is already a ramp built for Pt at their mobile home.    Per Dante she can provide transport for Pt to home once Pt is medically clear..     Liz Tapia to place DME orders.    DME choice faxed to Melissa GARNETT.    16:16 RobLouis delivered Pt's wheelchair to bedside.      Escalations Completed: None    Medically Clear: No    Next Steps:   This RN CM to continue to assist Pt with discharge as needed    Barriers to Discharge:   Pending medical clearance      Is the patient up for discharge tomorrow: No

## 2022-06-02 NOTE — CARE PLAN
Problem: Knowledge Deficit - Standard  Goal: Patient and family/care givers will demonstrate understanding of plan of care, disease process/condition, diagnostic tests and medications  Outcome: Progressing     Problem: Pain - Standard  Goal: Alleviation of pain or a reduction in pain to the patient’s comfort goal  Outcome: Progressing   The patient is Stable - Low risk of patient condition declining or worsening    Shift Goals  Clinical Goals: pain control  Patient Goals: pain control  Family Goals: No family present    Progress made toward(s) clinical / shift goals:  pts pain managed with prn pain medication. POC discussed with pt, pt verbalized understanding of plan.

## 2022-06-02 NOTE — NON-PROVIDER
"    This note is intended for the purposes of medical student education and feedback only.   Please refer to the documentation by this patient's assigned medical practitioner for details of care and plans.    Reason for admission: Pt was admitted 5/26/22 [facility] with multiple injuries including b/l rib fxs, pulmonary contusion, small left apical pneumothorax, grade 2 liver injury, grade 3 spleen injury, unstable closed pelvic fx, L4/5 transverse process fx, left ankle fx, and acute left popliteal DVT.     HD/POD#:   POD #6 from ORIF pelvis   POD #4 following IVC filter placement.       SUBJECTIVE  No acute events overnight. Pt feels pain has been much better controlled and he slept well. He had \"half a bowel movement\" yesterday. Voiding without issue. Has an appetite and tolerating full diet. Requests wheel chair to take a lap around the unit with wife to get out of his room for a bit. Discussed results of pelvis xray with patient and UA. He now can correlate areas of pain to injuries, currently his left chest and thoracic back are the source of most of the pain. Denies lightheadness, dysphagia, palpitations, SOB, n/v/d, or new extremity pain.     OBJECTIVE   Vital Signs:  Temp:  [36.4 °C (97.6 °F)-37.2 °C (98.9 °F)] 36.9 °C (98.5 °F)  Pulse:  [] 87  Resp:  [16-20] 16  BP: (138-151)/() 148/104  • SpO2:  [93 %-96 %] 93 %on RA      Physical Exam:  General: Well appearing, sleeping comfortably in bed when encountered.   HEENT: NC/AT. No scleral icterus. EOMI. Exotropia noted of left eye. Pinnas intact b/l. MMM.  Cardiovascular: RRR, no m/r/g. No JVD. 2+ radial pulses b/l  Chest: TTP over left chest. No crepitus.   Respiratory: Normal work of breathing. CTAB b/l. No wheezes, rales, rhonchi or crackles.   Abdominal exam: Mild distension noted. Mild TTP in lower right quadrant but improved from yesterday. Non tender RUQ, LUQ and negative murphys.   Extremities: Moving upper extremities spontaneously. " Can move all joints from the knee and inferiorly without pain.   Neurological: CN's 2-12 grossly intact.   Skin: Without jaundice. Dark purple bruising present from pubic symphysis tracking down penis and down left medial leg to the level of the knee. Similar appearing bruising present along medial aspect of left upper arm to the elbow and part way down the forearm.     Ins/Outs:    Intake/Output Summary (Last 24 hours) at 6/2/2022 0747  Last data filed at 6/2/2022 0400  Gross per 24 hour   Intake 3140 ml   Output 1200 ml   Net 1940 ml   •     UA 6/1/2022   Latest Reference Range & Units 06/01/22 13:00   Color  Yellow   Character  Hazy !   Specific Gravity <1.035  1.014   Ph 5.0 - 8.0  8.0   Glucose Negative mg/dL Negative   Ketones Negative mg/dL Negative   Bilirubin Negative  Negative   Occult Blood Negative  Negative   Protein Negative mg/dL Negative   Nitrite Negative  Negative   Leukocyte Esterase Negative  Negative   Urobilinogen, Urine Negative  1.0   Micro Urine Req  Microscopic   WBC /hpf 0-2 ! [1]   RBC /hpf 0-2 ! [2]   Epithelial Cells /hpf Negative   Bacteria None /hpf Negative   Hyaline Cast /lpf 0-2       Lab Results:  Recent Labs     05/31/22 0446 06/01/22  0505 06/02/22  0244   WBC 11.9* 17.3* 14.9*   RBC 2.76* 3.02* 2.87*   HEMOGLOBIN 8.6* 9.4* 9.1*   HEMATOCRIT 25.6* 27.4* 27.5*   MCV 92.8 90.7 95.8   MCH 31.2 31.1 31.7   MCHC 33.6* 34.3 33.1*   RDW 46.1 45.3 49.6   PLATELETCT 192 262 261   MPV 10.1 9.8 9.5     Recent Labs     05/31/22 0446 06/01/22  0505 06/02/22  0244   SODIUM 140 139 140   POTASSIUM 3.2* 3.4* 4.2   CHLORIDE 102 101 102   CO2 28 25 28   GLUCOSE 121* 113* 122*   BUN 9 9 9   CREATININE 0.57 0.56 0.55   CALCIUM 8.7 9.2 9.0         Recent Labs     05/31/22 0446 06/01/22  0505 06/02/22  0244   ASTSGOT 151* 157* 131*   ALTSGPT 118* 152* 148*   TBILIRUBIN 1.6* 2.4* 2.0*   ALKPHOSPHAT 158* 205* 206*   GLOBULIN 2.4 2.6 2.5   AMMONIA  --   --  14       Imaging Results:  1. DX Pelvis  6/2/22    FINDINGS:  There are 2 screws through the left sacroiliac joint, the inferior one extends through the right sacroiliac joint. There are screws through bilateral superior pubic rami. The the fractures are in unchanged alignment. SI joints and pubic symphysis are   intact.     IMPRESSION:     Post ORIF of bilateral pelvic fractures in unchanged alignment.  _________________________________________________________  2. U/S RUQ 6/1/22    FINDINGS:  The liver is normal in contour. There is no evidence of solid mass lesion. The known hepatic laceration is better appreciated on CT. The liver measures 13.48 cm.     The gallbladder is normal. There is no evidence of cholelithiasis.  The gallbladder wall thickness measures 2.00 mm. There is no pericholecystic fluid.  The common duct measures 4.70 mm.     The visualized pancreas is unremarkable.  The visualized aorta is normal in caliber.     Intrahepatic IVC is patent.     The portal vein is patent with hepatopetal flow. The MPV measures 1.11 cm.     The right kidney measures 9.69 cm.     There is no ascites. There are bilateral pleural effusion     IMPRESSION:     1.  Bilateral pleural effusion.  2.  Unremarkable ultrasound appearance of the liver, the hepatic laceration is better appreciated on CT.      ASSESSMENT/PLAN  Mr Schwab is a very pleasant 35yo male who presented to the [facility] ED bib care flight from [facility].  was found to have many injuries including b/l rib fxs, pulmonary contusion, small left apical pneumothorax, grade 2 liver injury, grade 3 spleen injury, unstable closed pelvic fx, L4/5 transverse process fx, left ankle fx, and acute left popliteal DVT. He is reporting improved pain control today. His Alk Phos is unchanged, but Tbili, and transaminase levels all improved today. His CPK continues to decrease. WBC fell from 17 to 15 and neutrophils wnl, some bandemia is present though. UA was wnl 6/1/22 and pt has been afebrile with a Tmax of  98.9 in the past 24hrs. Pelvis xray 6/1 shows no changes since ORIF and RUQ U/S 6/1 was unremarkable.     #Pubic fracture  5/27 ORIF  5/28 CT cystogram negative for bladder leak. Olmos removed 5/29 and pt is now urinating without issue and denies catie blood in urine. Weight bearing status toe touch on the left, and weight bearing on the right side for transfers only. PT/OT recommend therapy 4x/week. DX pelvis 6/1/22 shows post ORIF changes with unchanged alignment.      #Splenic laceration, grade 3  #Liver laceration, grade 2  #Transaminitis  #Elevated Alkaline Phosphatase  Small hematoma noted at spleen, no liver hematoma. No TTP in LUQ. Continue serial abdominal exams and lab studies. TBili is  now at 2. Alk Phos 206 unchanged from yesterday. Transaminitis also present, minimally improved. Non tender in RUQ and murphys negative on exam. RUQ U/S 6/1 was unremarkable and states laceration would be better evaluated with CT.   - Trend Transaminases, Tbili, Direct bili, Alk Phos, and GGT  - Consider replacing tylenol with other treatment (Celebrex, Mobic)     #Rib fractures, multiple  #b/l lung contusions  Left posterior ribs 2-5 & 7-11. 3-8 rib flail segment. Contusions L>R. Continue IS and pulmonary hygiene which pt has been good about (IS >3000). Pt reports less sputum production that is more clear and less dark brown. Pt has been tolerating RA for 3 days now.   - Continue pain management with multimodal pain relief.   - Continue aggressive pulmonary Hygiene.   - Maintain O2 saturation >92%.      #DVT of popliteal vein  IVC filter placed by IR 5/29/22. Initial contraindication for anticoagulation, but pt now appropriate for anticoagulation with DOAC  - Rivaroxaban 15mg BID  - CTM Hgb, vital signs and serial abdominal exams.   - Will need outpatient f/u with anticoagulation clinic upon d/c.     #Acute blood loss anemia  Hgb 9.1 and stable. Pt denies lightheadedness and dizziness. Pt w/o tachycardia.   - CTM Hgb with  CBC and vital signs.   - Transfuse if Hgb <7 (A+ blood type) or at <8 if symptomatic     #Closed ankle fracture, left  Ossific density adjacent to the medial malleolus, appearance suggests small ligaments avulsion fracture fragment.  Non-operative management.  Weight bearing status - Touch toe weightbearing LLE, left ankle brace or boot as needed for comfort.  aMnolo Han MD. Orthopedic Surgeon. [facility].     #Closed fracture L4-5 transverse process  NSG consultation not indicated, pain control as treatment.      #Hypokalemia  Resolved at 4.2 6/2/22 without intervention. Likely diet related.  - CTM CMP     PROPHYLAXIS  · DVT: IVC filter, Rivaroxaban initiated 15mg BID 5/31/22pm
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
## 2022-06-02 NOTE — CARE PLAN
The patient is Stable - Low risk of patient condition declining or worsening    Shift Goals  Clinical Goals: Pain management, OOB movement  Patient Goals: Pain management, OOB movement  Family Goals: No family present    Progress made toward(s) clinical / shift goals:       Problem: Knowledge Deficit - Standard  Goal: Patient and family/care givers will demonstrate understanding of plan of care, disease process/condition, diagnostic tests and medications  Outcome: Progressing     Problem: Pain - Standard  Goal: Alleviation of pain or a reduction in pain to the patient’s comfort goal  Outcome: Progressing     Problem: Skin Integrity  Goal: Skin integrity is maintained or improved  Outcome: Progressing       Patient is not progressing towards the following goals:

## 2022-06-02 NOTE — DISCHARGE PLANNING
Follow up for post acute services no new information about insurance benefit for IRF level of care. Anticipate home with outpatietn support when medically cleared. TCC no longer following.

## 2022-06-03 ENCOUNTER — APPOINTMENT (OUTPATIENT)
Dept: RADIOLOGY | Facility: MEDICAL CENTER | Age: 37
DRG: 957 | End: 2022-06-03
Attending: NURSE PRACTITIONER
Payer: COMMERCIAL

## 2022-06-03 PROBLEM — D62 ACUTE BLOOD LOSS ANEMIA: Status: RESOLVED | Noted: 2022-05-29 | Resolved: 2022-06-03

## 2022-06-03 LAB
ALBUMIN SERPL BCP-MCNC: 3.7 G/DL (ref 3.2–4.9)
ALBUMIN/GLOB SERPL: 1.4 G/DL
ALP SERPL-CCNC: 246 U/L (ref 30–99)
ALT SERPL-CCNC: 190 U/L (ref 2–50)
ANION GAP SERPL CALC-SCNC: 10 MMOL/L (ref 7–16)
AST SERPL-CCNC: 134 U/L (ref 12–45)
BASOPHILS # BLD AUTO: 0.3 % (ref 0–1.8)
BASOPHILS # BLD: 0.04 K/UL (ref 0–0.12)
BILIRUB SERPL-MCNC: 2 MG/DL (ref 0.1–1.5)
BUN SERPL-MCNC: 10 MG/DL (ref 8–22)
CALCIUM SERPL-MCNC: 9.1 MG/DL (ref 8.5–10.5)
CHLORIDE SERPL-SCNC: 102 MMOL/L (ref 96–112)
CK SERPL-CCNC: 587 U/L (ref 0–154)
CO2 SERPL-SCNC: 27 MMOL/L (ref 20–33)
CREAT SERPL-MCNC: 0.56 MG/DL (ref 0.5–1.4)
EOSINOPHIL # BLD AUTO: 0.51 K/UL (ref 0–0.51)
EOSINOPHIL NFR BLD: 3.9 % (ref 0–6.9)
ERYTHROCYTE [DISTWIDTH] IN BLOOD BY AUTOMATED COUNT: 50.5 FL (ref 35.9–50)
GFR SERPLBLD CREATININE-BSD FMLA CKD-EPI: 131 ML/MIN/1.73 M 2
GLOBULIN SER CALC-MCNC: 2.6 G/DL (ref 1.9–3.5)
GLUCOSE SERPL-MCNC: 107 MG/DL (ref 65–99)
HCT VFR BLD AUTO: 27.7 % (ref 42–52)
HGB BLD-MCNC: 9.3 G/DL (ref 14–18)
IMM GRANULOCYTES # BLD AUTO: 0.39 K/UL (ref 0–0.11)
IMM GRANULOCYTES NFR BLD AUTO: 2.9 % (ref 0–0.9)
LYMPHOCYTES # BLD AUTO: 2 K/UL (ref 1–4.8)
LYMPHOCYTES NFR BLD: 15.1 % (ref 22–41)
MCH RBC QN AUTO: 31.5 PG (ref 27–33)
MCHC RBC AUTO-ENTMCNC: 33.6 G/DL (ref 33.7–35.3)
MCV RBC AUTO: 93.9 FL (ref 81.4–97.8)
MONOCYTES # BLD AUTO: 1.69 K/UL (ref 0–0.85)
MONOCYTES NFR BLD AUTO: 12.8 % (ref 0–13.4)
NEUTROPHILS # BLD AUTO: 8.6 K/UL (ref 1.82–7.42)
NEUTROPHILS NFR BLD: 65 % (ref 44–72)
NRBC # BLD AUTO: 0 K/UL
NRBC BLD-RTO: 0 /100 WBC
PLATELET # BLD AUTO: 308 K/UL (ref 164–446)
PMV BLD AUTO: 9.8 FL (ref 9–12.9)
POTASSIUM SERPL-SCNC: 3.9 MMOL/L (ref 3.6–5.5)
PROT SERPL-MCNC: 6.3 G/DL (ref 6–8.2)
RBC # BLD AUTO: 2.95 M/UL (ref 4.7–6.1)
SODIUM SERPL-SCNC: 139 MMOL/L (ref 135–145)
WBC # BLD AUTO: 13.2 K/UL (ref 4.8–10.8)

## 2022-06-03 PROCEDURE — 99232 SBSQ HOSP IP/OBS MODERATE 35: CPT | Mod: FS

## 2022-06-03 PROCEDURE — 36415 COLL VENOUS BLD VENIPUNCTURE: CPT

## 2022-06-03 PROCEDURE — A9270 NON-COVERED ITEM OR SERVICE: HCPCS | Performed by: SPECIALIST

## 2022-06-03 PROCEDURE — A9270 NON-COVERED ITEM OR SERVICE: HCPCS | Performed by: NURSE PRACTITIONER

## 2022-06-03 PROCEDURE — 700111 HCHG RX REV CODE 636 W/ 250 OVERRIDE (IP): Performed by: NURSE PRACTITIONER

## 2022-06-03 PROCEDURE — 700102 HCHG RX REV CODE 250 W/ 637 OVERRIDE(OP): Performed by: SPECIALIST

## 2022-06-03 PROCEDURE — 770001 HCHG ROOM/CARE - MED/SURG/GYN PRIV*

## 2022-06-03 PROCEDURE — A9270 NON-COVERED ITEM OR SERVICE: HCPCS

## 2022-06-03 PROCEDURE — 700102 HCHG RX REV CODE 250 W/ 637 OVERRIDE(OP): Performed by: NURSE PRACTITIONER

## 2022-06-03 PROCEDURE — 700101 HCHG RX REV CODE 250: Performed by: NURSE PRACTITIONER

## 2022-06-03 PROCEDURE — 71045 X-RAY EXAM CHEST 1 VIEW: CPT

## 2022-06-03 PROCEDURE — 82550 ASSAY OF CK (CPK): CPT

## 2022-06-03 PROCEDURE — 85025 COMPLETE CBC W/AUTO DIFF WBC: CPT

## 2022-06-03 PROCEDURE — 700102 HCHG RX REV CODE 250 W/ 637 OVERRIDE(OP)

## 2022-06-03 PROCEDURE — 80053 COMPREHEN METABOLIC PANEL: CPT

## 2022-06-03 RX ORDER — OXYCODONE HYDROCHLORIDE 5 MG/1
5 TABLET ORAL EVERY 4 HOURS PRN
Status: DISCONTINUED | OUTPATIENT
Start: 2022-06-03 | End: 2022-06-05

## 2022-06-03 RX ORDER — OXYCODONE HYDROCHLORIDE 10 MG/1
10 TABLET ORAL EVERY 6 HOURS PRN
Status: DISCONTINUED | OUTPATIENT
Start: 2022-06-03 | End: 2022-06-05

## 2022-06-03 RX ORDER — OXYCODONE HYDROCHLORIDE 5 MG/1
5-10 TABLET ORAL EVERY 4 HOURS PRN
Qty: 28 TABLET | Refills: 0 | Status: CANCELLED | OUTPATIENT
Start: 2022-06-03 | End: 2022-06-10

## 2022-06-03 RX ORDER — METAXALONE 800 MG/1
800 TABLET ORAL 4 TIMES DAILY
Status: DISCONTINUED | OUTPATIENT
Start: 2022-06-03 | End: 2022-06-07 | Stop reason: HOSPADM

## 2022-06-03 RX ADMIN — METAXALONE 800 MG: 800 TABLET ORAL at 17:52

## 2022-06-03 RX ADMIN — POLYETHYLENE GLYCOL 3350 1 PACKET: 17 POWDER, FOR SOLUTION ORAL at 04:17

## 2022-06-03 RX ADMIN — RIVAROXABAN 15 MG: 15 TABLET, FILM COATED ORAL at 17:52

## 2022-06-03 RX ADMIN — LISINOPRIL 10 MG: 10 TABLET ORAL at 04:17

## 2022-06-03 RX ADMIN — MAGNESIUM HYDROXIDE 30 ML: 400 SUSPENSION ORAL at 04:17

## 2022-06-03 RX ADMIN — LIDOCAINE 3 PATCH: 50 PATCH TOPICAL at 04:17

## 2022-06-03 RX ADMIN — VENLAFAXINE HYDROCHLORIDE 75 MG: 75 CAPSULE, EXTENDED RELEASE ORAL at 04:16

## 2022-06-03 RX ADMIN — OXYCODONE HYDROCHLORIDE 10 MG: 10 TABLET ORAL at 10:54

## 2022-06-03 RX ADMIN — HYDROMORPHONE HYDROCHLORIDE 0.5 MG: 1 INJECTION, SOLUTION INTRAMUSCULAR; INTRAVENOUS; SUBCUTANEOUS at 03:22

## 2022-06-03 RX ADMIN — GABAPENTIN 300 MG: 300 CAPSULE ORAL at 04:16

## 2022-06-03 RX ADMIN — METAXALONE 800 MG: 800 TABLET ORAL at 21:26

## 2022-06-03 RX ADMIN — RIVAROXABAN 15 MG: 15 TABLET, FILM COATED ORAL at 08:28

## 2022-06-03 RX ADMIN — GABAPENTIN 300 MG: 300 CAPSULE ORAL at 13:17

## 2022-06-03 RX ADMIN — METAXALONE 800 MG: 800 TABLET ORAL at 13:16

## 2022-06-03 RX ADMIN — DOCUSATE SODIUM 100 MG: 100 CAPSULE, LIQUID FILLED ORAL at 04:17

## 2022-06-03 RX ADMIN — Medication 5 MG: at 21:26

## 2022-06-03 RX ADMIN — OXYCODONE HYDROCHLORIDE 10 MG: 10 TABLET ORAL at 17:52

## 2022-06-03 RX ADMIN — METAXALONE 800 MG: 800 TABLET ORAL at 04:16

## 2022-06-03 RX ADMIN — GABAPENTIN 300 MG: 300 CAPSULE ORAL at 21:26

## 2022-06-03 ASSESSMENT — PAIN DESCRIPTION - PAIN TYPE
TYPE: ACUTE PAIN

## 2022-06-03 ASSESSMENT — ENCOUNTER SYMPTOMS
CHILLS: 0
ROS GI COMMENTS: LAST BM 6/1
HEADACHES: 0
SPEECH CHANGE: 0
COUGH: 0
NAUSEA: 0
FEVER: 0
FOCAL WEAKNESS: 0
SENSORY CHANGE: 0
ABDOMINAL PAIN: 0
VOMITING: 0
TINGLING: 0
NECK PAIN: 0
SHORTNESS OF BREATH: 0
MYALGIAS: 1
DIZZINESS: 0
BACK PAIN: 0

## 2022-06-03 NOTE — PROGRESS NOTES
Report received from RN, assumed care at 1845  Pt is A0x4, and responds appropriately  Pt declines any SOB, chest pain, new onset of numbness/tingling  Pt rates pain at 6/10, on a scale of 1-10, pt medicated per MAR  Pt is voiding adequately and without hesitancy  Pt has + flatus, + bowel sounds, + BM on 6/1  Pt transfers with x1 assist with FWW to wheelchair. WBAT to RLE, TTWB to LLE  Pt is tolerating a regular diet, denies any nausea/vomiting  Plan of care discussed, all questions answered. Explained importance of calling before getting OOB and pt verbalized understanding. Explained importance of oral care. Call light is within reach, treaded slipper socks on, bed in lowest/locked position, hourly rounding in place, all needs met at this time.

## 2022-06-03 NOTE — THERAPY
"Physical Therapy Contact Note    Patient Name: Joseph Schwab  Age:  36 y.o., Sex:  male  Medical Record #: [id number]  Today's Date: 6/2/2022    Discussed equipment needs upon dc w/ pt and his SO, along w/ clarification assist available and comfort w/ dc home at current level of mobility. Discussed w/ RN and case management.  Recommend pt dc home w/ HH at this time w/ standard 18\" wc w/ leg rest given level of assist available at home to the pt.  Will continue to follow.      Kenji Snowden, PT, DPT [id number]  "

## 2022-06-03 NOTE — PROGRESS NOTES
"   Orthopaedic Progress Note    Interval changes:  Patient doing well  Cleared for DC by ortho pending trauma clearance    ROS - Patient denies any new issues.  Pain well controlled.    BP (!) 145/95   Pulse 91   Temp 36.6 °C (97.8 °F) (Temporal)   Resp 17   Ht 1.905 m (6' 3\")   Wt 76.8 kg (169 lb 5 oz)   SpO2 92%       Patient seen and examined  No acute distress  Breathing non labored  RRR  Pelvic dressings CDI, DNVI, moves all toes, cap refill <2 sec.     Recent Labs     06/01/22  0505 06/02/22  0244 06/03/22  0210   WBC 17.3* 14.9* 13.2*   RBC 3.02* 2.87* 2.95*   HEMOGLOBIN 9.4* 9.1* 9.3*   HEMATOCRIT 27.4* 27.5* 27.7*   MCV 90.7 95.8 93.9   MCH 31.1 31.7 31.5   MCHC 34.3 33.1* 33.6*   RDW 45.3 49.6 50.5*   PLATELETCT 262 261 308   MPV 9.8 9.5 9.8       Active Hospital Problems    Diagnosis    • Closed fracture of multiple ribs of both sides [S22.43XA]      Priority: High   • Contusion of both lungs [S27.322A]      Priority: High   • Leukocytosis [D72.829]      Priority: High   • Acute deep vein thrombosis (DVT) of popliteal vein of left lower extremity (HCC) [I82.432]      Priority: High   • Closed bilateral fracture of pubic rami (HCC) [S32.591A, S32.592A]      Priority: High   • Splenic laceration, initial encounter [S36.039A]      Priority: High   • Liver laceration, initial encounter [S36.113A]      Priority: High   • Primary hypertension [I10]      Priority: Medium   • Ankle fracture, left, closed, initial encounter [S82.892A]      Priority: Medium   • Closed fracture of transverse process of lumbar vertebra (HCC) [S32.009A]      Priority: Medium   • Discharge planning issues [Z02.9]      Priority: Low   • Trauma [T14.90XA]      Priority: Low   • Anxiety [F41.9]      Priority: Low       Assessment/Plan:  Patient doing well  Cleared for DC by ortho pending trauma clearance  POD#7 S/P:   1.  Percutaneous skeletal fixation of left sacroiliac joint fracture dislocation through first sacral segment.  2.  " Percutaneous skeletal fixation of left sacroiliac joint fracture dislocation through second sacral segment.  3.  Percutaneous skeletal fixation of left anterior pelvic fractures.  4.  Percutaneous skeletal fixation of right anterior pelvic fractures.   Wt bearing status - TTWB LLE and transfers only RLE for 6-8 weeks   Wound care/Drains - Dressings to be changed every other day by nursing  Future Procedures - none planned   Sutures/Staples out- 14 days post operatively  PT/OT-initiated  Antibiotics:  Perioperative completed  DVT Prophylaxis- TEDS/SCDs/Foot pumps  Olmos-none  Case Coordination for Discharge Planning - Disposition home vs SNF

## 2022-06-03 NOTE — DISCHARGE PLANNING
Care Transition Team Assessment    Information Source  Orientation Level: Oriented X4  Information Given By: Patient  Informant's Name: Alexei  Who is responsible for making decisions for patient? : Patient    Readmission Evaluation  Is this a readmission?: No    Elopement Risk  Legal Hold: No  Ambulatory or Self Mobile in Wheelchair: Yes  Disoriented: No  Psychiatric Symptoms: None  History of Wandering: No  Elopement this Admit: No  Vocalizing Wanting to Leave: No  Displays Behaviors, Body Language Wanting to Leave: No-Not at Risk for Elopement  Elopement Risk: Not at Risk for Elopement    Interdisciplinary Discharge Planning  Lives with - Patient's Self Care Capacity: Other (Comments) (danny who is expecting a  baby very soon)  Patient or legal guardian wants to designate a caregiver: No  Housing / Facility: [address] [facility]  Prior Services: None    Discharge Preparedness  What is your plan after discharge?: Home with help  What are your discharge supports?: Spouse, Partner  Prior Functional Level: Ambulatory  Difficulity with ADLs: Walking  Difficulty with ADLs Comment: Pt is using a walker  Difficulity with IADLs: None    Functional Assesment  Prior Functional Level: Ambulatory    Finances  Financial Barriers to Discharge: No  Prescription Coverage: Yes    Vision / Hearing Impairment  Vision Impairment : Yes  Left Eye Vision: Blind  Hearing Impairment : No         Advance Directive  Advance Directive?: None  Advance Directive offered?: AD Booklet refused    Domestic Abuse  Have you ever been the victim of abuse or violence?: No  Physical Abuse or Sexual Abuse: No  Verbal Abuse or Emotional Abuse: No  Possible Abuse/Neglect Reported to:: Not Applicable    Psychological Assessment  History of Substance Abuse: Marijuana  Date Last Used - Marijuana: Pt is unsure  Substance Abuse Comments: Past Marijuana use  History of Psychiatric Problems: No  Non-compliant with Treatment: No  Newly Diagnosed Illness:  No    Discharge Risks or Barriers  Discharge risks or barriers?: No  Patient risk factors: Complex medical needs    Anticipated Discharge Information  Discharge Disposition: Discharged to home/self care (01)
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
## 2022-06-03 NOTE — CARE PLAN
Problem: Knowledge Deficit - Standard  Goal: Patient and family/care givers will demonstrate understanding of plan of care, disease process/condition, diagnostic tests and medications  Outcome: Progressing     Problem: Pain - Standard  Goal: Alleviation of pain or a reduction in pain to the patient’s comfort goal  Outcome: Progressing   The patient is Stable - Low risk of patient condition declining or worsening    Shift Goals  Clinical Goals: pain management  Patient Goals: BM, pain control  Family Goals: No family present    Progress made toward(s) clinical / shift goals:  Pts pain managed with prn pain medication.  POC discussed with pt, pt verbalized understanding of plan.

## 2022-06-03 NOTE — FACE TO FACE
Face to Face Supporting Documentation - Home Health    The encounter with this patient was in whole or in part the primary reason for home health admission.    Date of encounter:   Patient:                    MRN:                       YOB: 20[birth year]  Joseph Schwab  [id number]  1985     Home health to see patient for:  Physical Therapy evaluation and treatment and Occupational therapy evaluation and treatment    Skilled need for:  New Onset Medical Diagnosis Trauma - pelvis fracture, rib fractures, transverse process fractures    Skilled nursing interventions to include:  Comment: Physical and occupational therapy    Homebound status evidenced by:  Need the aid of supportive devices such as crutches, canes, wheelchairs or walkers or Needs the assistance of another person in order to leave the home. Leaving home requires a considerable and taxing effort. There is a normal inability to leave the home.    Community Physician to provide follow up care: VENESSA De La Rosa     Optional Interventions? No      I certify the face to face encounter for this home health care referral meets the CMS requirements and the encounter/clinical assessment with the patient was, in whole, or in part, for the medical condition(s) listed above, which is the primary reason for home health care. Based on my clinical findings: the service(s) are medically necessary, support the need for home health care, and the homebound criteria are met.  I certify that this patient has had a face to face encounter by myself.  Liz Tapia D.N.P. - NPI: [id number]
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
## 2022-06-04 ENCOUNTER — APPOINTMENT (OUTPATIENT)
Dept: RADIOLOGY | Facility: MEDICAL CENTER | Age: 37
DRG: 957 | End: 2022-06-04
Payer: COMMERCIAL

## 2022-06-04 LAB
ALBUMIN SERPL BCP-MCNC: 4 G/DL (ref 3.2–4.9)
ALBUMIN/GLOB SERPL: 1.5 G/DL
ALP SERPL-CCNC: 324 U/L (ref 30–99)
ALT SERPL-CCNC: 249 U/L (ref 2–50)
ANION GAP SERPL CALC-SCNC: 11 MMOL/L (ref 7–16)
AST SERPL-CCNC: 139 U/L (ref 12–45)
BASOPHILS # BLD AUTO: 0.4 % (ref 0–1.8)
BASOPHILS # BLD: 0.07 K/UL (ref 0–0.12)
BILIRUB SERPL-MCNC: 1.8 MG/DL (ref 0.1–1.5)
BUN SERPL-MCNC: 12 MG/DL (ref 8–22)
CALCIUM SERPL-MCNC: 9.2 MG/DL (ref 8.5–10.5)
CHLORIDE SERPL-SCNC: 99 MMOL/L (ref 96–112)
CK SERPL-CCNC: 290 U/L (ref 0–154)
CO2 SERPL-SCNC: 27 MMOL/L (ref 20–33)
CREAT SERPL-MCNC: 0.65 MG/DL (ref 0.5–1.4)
EOSINOPHIL # BLD AUTO: 0.58 K/UL (ref 0–0.51)
EOSINOPHIL NFR BLD: 3.6 % (ref 0–6.9)
ERYTHROCYTE [DISTWIDTH] IN BLOOD BY AUTOMATED COUNT: 54.4 FL (ref 35.9–50)
GFR SERPLBLD CREATININE-BSD FMLA CKD-EPI: 126 ML/MIN/1.73 M 2
GLOBULIN SER CALC-MCNC: 2.7 G/DL (ref 1.9–3.5)
GLUCOSE SERPL-MCNC: 110 MG/DL (ref 65–99)
GRAM STN SPEC: NORMAL
HCT VFR BLD AUTO: 31.2 % (ref 42–52)
HGB BLD-MCNC: 10.1 G/DL (ref 14–18)
IMM GRANULOCYTES # BLD AUTO: 0.64 K/UL (ref 0–0.11)
IMM GRANULOCYTES NFR BLD AUTO: 4 % (ref 0–0.9)
LYMPHOCYTES # BLD AUTO: 2.35 K/UL (ref 1–4.8)
LYMPHOCYTES NFR BLD: 14.6 % (ref 22–41)
MCH RBC QN AUTO: 31.3 PG (ref 27–33)
MCHC RBC AUTO-ENTMCNC: 32.4 G/DL (ref 33.7–35.3)
MCV RBC AUTO: 96.6 FL (ref 81.4–97.8)
MONOCYTES # BLD AUTO: 1.84 K/UL (ref 0–0.85)
MONOCYTES NFR BLD AUTO: 11.5 % (ref 0–13.4)
NEUTROPHILS # BLD AUTO: 10.57 K/UL (ref 1.82–7.42)
NEUTROPHILS NFR BLD: 65.9 % (ref 44–72)
NRBC # BLD AUTO: 0 K/UL
NRBC BLD-RTO: 0 /100 WBC
PLATELET # BLD AUTO: 401 K/UL (ref 164–446)
PMV BLD AUTO: 9.8 FL (ref 9–12.9)
POTASSIUM SERPL-SCNC: 3.8 MMOL/L (ref 3.6–5.5)
PROT SERPL-MCNC: 6.7 G/DL (ref 6–8.2)
RBC # BLD AUTO: 3.23 M/UL (ref 4.7–6.1)
SCCMEC + MECA PNL NOSE NAA+PROBE: POSITIVE
SIGNIFICANT IND 70042: NORMAL
SITE SITE: NORMAL
SODIUM SERPL-SCNC: 137 MMOL/L (ref 135–145)
SOURCE SOURCE: NORMAL
WBC # BLD AUTO: 16.1 K/UL (ref 4.8–10.8)

## 2022-06-04 PROCEDURE — A9270 NON-COVERED ITEM OR SERVICE: HCPCS

## 2022-06-04 PROCEDURE — 80053 COMPREHEN METABOLIC PANEL: CPT

## 2022-06-04 PROCEDURE — 71260 CT THORAX DX C+: CPT

## 2022-06-04 PROCEDURE — 71045 X-RAY EXAM CHEST 1 VIEW: CPT

## 2022-06-04 PROCEDURE — 99232 SBSQ HOSP IP/OBS MODERATE 35: CPT | Mod: FS

## 2022-06-04 PROCEDURE — 700101 HCHG RX REV CODE 250: Performed by: NURSE PRACTITIONER

## 2022-06-04 PROCEDURE — A9270 NON-COVERED ITEM OR SERVICE: HCPCS | Performed by: SPECIALIST

## 2022-06-04 PROCEDURE — 94669 MECHANICAL CHEST WALL OSCILL: CPT

## 2022-06-04 PROCEDURE — 36415 COLL VENOUS BLD VENIPUNCTURE: CPT

## 2022-06-04 PROCEDURE — 700105 HCHG RX REV CODE 258

## 2022-06-04 PROCEDURE — 770001 HCHG ROOM/CARE - MED/SURG/GYN PRIV*

## 2022-06-04 PROCEDURE — 700102 HCHG RX REV CODE 250 W/ 637 OVERRIDE(OP): Performed by: NURSE PRACTITIONER

## 2022-06-04 PROCEDURE — 87205 SMEAR GRAM STAIN: CPT

## 2022-06-04 PROCEDURE — 85025 COMPLETE CBC W/AUTO DIFF WBC: CPT

## 2022-06-04 PROCEDURE — 87641 MR-STAPH DNA AMP PROBE: CPT

## 2022-06-04 PROCEDURE — 700117 HCHG RX CONTRAST REV CODE 255

## 2022-06-04 PROCEDURE — 87040 BLOOD CULTURE FOR BACTERIA: CPT

## 2022-06-04 PROCEDURE — A9270 NON-COVERED ITEM OR SERVICE: HCPCS | Performed by: NURSE PRACTITIONER

## 2022-06-04 PROCEDURE — 700111 HCHG RX REV CODE 636 W/ 250 OVERRIDE (IP)

## 2022-06-04 PROCEDURE — 700102 HCHG RX REV CODE 250 W/ 637 OVERRIDE(OP): Performed by: SPECIALIST

## 2022-06-04 PROCEDURE — 700102 HCHG RX REV CODE 250 W/ 637 OVERRIDE(OP)

## 2022-06-04 PROCEDURE — 87070 CULTURE OTHR SPECIMN AEROBIC: CPT

## 2022-06-04 PROCEDURE — 82550 ASSAY OF CK (CPK): CPT

## 2022-06-04 RX ADMIN — METAXALONE 800 MG: 800 TABLET ORAL at 12:29

## 2022-06-04 RX ADMIN — OXYCODONE HYDROCHLORIDE 10 MG: 10 TABLET ORAL at 06:20

## 2022-06-04 RX ADMIN — METAXALONE 800 MG: 800 TABLET ORAL at 21:03

## 2022-06-04 RX ADMIN — METAXALONE 800 MG: 800 TABLET ORAL at 16:41

## 2022-06-04 RX ADMIN — GABAPENTIN 300 MG: 300 CAPSULE ORAL at 04:43

## 2022-06-04 RX ADMIN — VANCOMYCIN HYDROCHLORIDE 2000 MG: 500 INJECTION, POWDER, LYOPHILIZED, FOR SOLUTION INTRAVENOUS at 16:44

## 2022-06-04 RX ADMIN — IOHEXOL 76 ML: 350 INJECTION, SOLUTION INTRAVENOUS at 18:15

## 2022-06-04 RX ADMIN — Medication 5 MG: at 21:03

## 2022-06-04 RX ADMIN — GABAPENTIN 300 MG: 300 CAPSULE ORAL at 15:07

## 2022-06-04 RX ADMIN — PIPERACILLIN AND TAZOBACTAM 4.5 G: 4; .5 INJECTION, POWDER, FOR SOLUTION INTRAVENOUS at 21:31

## 2022-06-04 RX ADMIN — LIDOCAINE 3 PATCH: 50 PATCH TOPICAL at 04:43

## 2022-06-04 RX ADMIN — LISINOPRIL 10 MG: 10 TABLET ORAL at 04:43

## 2022-06-04 RX ADMIN — OXYCODONE HYDROCHLORIDE 10 MG: 10 TABLET ORAL at 12:30

## 2022-06-04 RX ADMIN — PIPERACILLIN AND TAZOBACTAM 4.5 G: 4; .5 INJECTION, POWDER, FOR SOLUTION INTRAVENOUS at 15:58

## 2022-06-04 RX ADMIN — RIVAROXABAN 15 MG: 15 TABLET, FILM COATED ORAL at 16:41

## 2022-06-04 RX ADMIN — VENLAFAXINE HYDROCHLORIDE 75 MG: 75 CAPSULE, EXTENDED RELEASE ORAL at 04:43

## 2022-06-04 RX ADMIN — GABAPENTIN 300 MG: 300 CAPSULE ORAL at 21:03

## 2022-06-04 RX ADMIN — OXYCODONE HYDROCHLORIDE 10 MG: 10 TABLET ORAL at 00:06

## 2022-06-04 RX ADMIN — METAXALONE 800 MG: 800 TABLET ORAL at 08:44

## 2022-06-04 RX ADMIN — RIVAROXABAN 15 MG: 15 TABLET, FILM COATED ORAL at 08:44

## 2022-06-04 RX ADMIN — OXYCODONE HYDROCHLORIDE 10 MG: 10 TABLET ORAL at 19:50

## 2022-06-04 ASSESSMENT — ENCOUNTER SYMPTOMS
MYALGIAS: 1
SENSORY CHANGE: 0
SHORTNESS OF BREATH: 0
NAUSEA: 0
DIZZINESS: 0
VOMITING: 0
SPEECH CHANGE: 0
BACK PAIN: 0
FOCAL WEAKNESS: 0
CHILLS: 0
HEADACHES: 0
TINGLING: 0
FEVER: 0
NECK PAIN: 0
ABDOMINAL PAIN: 0
COUGH: 0
ROS GI COMMENTS: LAST BM 6/3

## 2022-06-04 ASSESSMENT — PAIN DESCRIPTION - PAIN TYPE
TYPE: ACUTE PAIN

## 2022-06-04 NOTE — PROGRESS NOTES
Trauma / Surgical Daily Progress Note    Date of Service  6/4/2022    Chief Complaint  36 y.o. male admitted 5/26/2022 with bilateral rib fractures, pulmonary contusions, trace left pneumothorax, grade 2 liver injury, grade 3 spleen injury, unstable pelvic ring fractures, L4/L5 transverse process fractures, left ankle fracture, and acute left popliteal DVT after a long term     POD # 8 ORIF pelvis  POD # 6 IVC filter placement    Interval Events  CXR with atelectasis and unchanged loculated hemothorax.  WBC increased to 16.1. Afebrile and nontoxic in appearance.  Hg stable.    - PEP therapy added.  - Aggressive pulmonary hygiene.  - Mobilize to chair.  - Counseled.  Disposition: Anticipate discharge home, when medically cleared.    Review of Systems  Review of Systems   Constitutional: Negative for chills and fever.   Respiratory: Negative for cough and shortness of breath.    Cardiovascular: Negative for chest pain.   Gastrointestinal: Negative for abdominal pain, nausea and vomiting.        Last BM 6/3   Genitourinary: Negative for dysuria (voiding) and urgency.   Musculoskeletal: Positive for joint pain (pelvic, left ankle) and myalgias (left chest wall). Negative for back pain and neck pain.   Neurological: Negative for dizziness, tingling, sensory change, speech change, focal weakness and headaches.   All other systems reviewed and are negative.       Vital Signs  Temp:  [35.9 °C (96.6 °F)-37.1 °C (98.7 °F)] 35.9 °C (96.6 °F)  Pulse:  [] 110  Resp:  [17] 17  BP: (130-142)/(91-98) 136/94  SpO2:  [94 %-96 %] 95 %    Physical Exam  Physical Exam  Vitals and nursing note reviewed.   Constitutional:       General: He is awake. He is not in acute distress.     Appearance: He is well-developed. He is not ill-appearing.      Comments: Sitting in chair   HENT:      Head: Normocephalic.   Cardiovascular:      Rate and Rhythm: Normal rate.   Pulmonary:      Effort: Pulmonary effort is normal. No respiratory distress.       Comments: Bilateral back rib pain  Chest:      Chest wall: Tenderness (chest wall ) present.   Abdominal:      General: Bowel sounds are normal. There is no distension.      Palpations: Abdomen is soft.      Tenderness: There is no abdominal tenderness. There is no guarding.   Musculoskeletal:         General: Tenderness present.      Cervical back: Neck supple.      Thoracic back: Spasms and tenderness present.      Comments: Right hip surgical dressing not visualized   Skin:     General: Skin is warm and dry.      Findings: Bruising present.      Comments: Left elbow bruising and swelling   Neurological:      Mental Status: He is alert and oriented to person, place, and time.      GCS: GCS eye subscore is 4. GCS verbal subscore is 5. GCS motor subscore is 6.   Psychiatric:         Mood and Affect: Mood normal.         Behavior: Behavior normal. Behavior is cooperative.         Laboratory  Recent Results (from the past 24 hour(s))   CBC with Differential: Tomorrow AM    Collection Time: 06/04/22  2:45 AM   Result Value Ref Range    WBC 16.1 (H) 4.8 - 10.8 K/uL    RBC 3.23 (L) 4.70 - 6.10 M/uL    Hemoglobin 10.1 (L) 14.0 - 18.0 g/dL    Hematocrit 31.2 (L) 42.0 - 52.0 %    MCV 96.6 81.4 - 97.8 fL    MCH 31.3 27.0 - 33.0 pg    MCHC 32.4 (L) 33.7 - 35.3 g/dL    RDW 54.4 (H) 35.9 - 50.0 fL    Platelet Count 401 164 - 446 K/uL    MPV 9.8 9.0 - 12.9 fL    Neutrophils-Polys 65.90 44.00 - 72.00 %    Lymphocytes 14.60 (L) 22.00 - 41.00 %    Monocytes 11.50 0.00 - 13.40 %    Eosinophils 3.60 0.00 - 6.90 %    Basophils 0.40 0.00 - 1.80 %    Immature Granulocytes 4.00 (H) 0.00 - 0.90 %    Nucleated RBC 0.00 /100 WBC    Neutrophils (Absolute) 10.57 (H) 1.82 - 7.42 K/uL    Lymphs (Absolute) 2.35 1.00 - 4.80 K/uL    Monos (Absolute) 1.84 (H) 0.00 - 0.85 K/uL    Eos (Absolute) 0.58 (H) 0.00 - 0.51 K/uL    Baso (Absolute) 0.07 0.00 - 0.12 K/uL    Immature Granulocytes (abs) 0.64 (H) 0.00 - 0.11 K/uL    NRBC (Absolute) 0.00 K/uL    Comp Metabolic Panel (CMP): Tomorrow AM    Collection Time: 06/04/22  2:45 AM   Result Value Ref Range    Sodium 137 135 - 145 mmol/L    Potassium 3.8 3.6 - 5.5 mmol/L    Chloride 99 96 - 112 mmol/L    Co2 27 20 - 33 mmol/L    Anion Gap 11.0 7.0 - 16.0    Glucose 110 (H) 65 - 99 mg/dL    Bun 12 8 - 22 mg/dL    Creatinine 0.65 0.50 - 1.40 mg/dL    Calcium 9.2 8.5 - 10.5 mg/dL    AST(SGOT) 139 (H) 12 - 45 U/L    ALT(SGPT) 249 (H) 2 - 50 U/L    Alkaline Phosphatase 324 (H) 30 - 99 U/L    Total Bilirubin 1.8 (H) 0.1 - 1.5 mg/dL    Albumin 4.0 3.2 - 4.9 g/dL    Total Protein 6.7 6.0 - 8.2 g/dL    Globulin 2.7 1.9 - 3.5 g/dL    A-G Ratio 1.5 g/dL   CREATINE KINASE    Collection Time: 06/04/22  2:45 AM   Result Value Ref Range    CPK Total 290 (H) 0 - 154 U/L   ESTIMATED GFR    Collection Time: 06/04/22  2:45 AM   Result Value Ref Range    GFR (CKD-EPI) 126 >60 mL/min/1.73 m 2       Fluids    Intake/Output Summary (Last 24 hours) at 6/4/2022 0932  Last data filed at 6/4/2022 0844  Gross per 24 hour   Intake 550 ml   Output 2400 ml   Net -1850 ml       Core Measures & Quality Metrics  Labs reviewed, Medications reviewed and Radiology images reviewed  Olmos catheter: No Olmos      DVT: 5/29 IVC filter placement with Xarelto   DVT prophylaxis - mechanical: SCDs  Ulcer prophylaxis: Not indicated    Assessed for rehab: Patient was assess for and/or received rehabilitation services during this hospitalization    RAP Score Total: 8    ETOH Screening  CAGE Score: 0  Assessment complete date: 5/29/2022 (Admission BA negative, CAGE negative)        Assessment/Plan  Liver laceration, initial encounter- (present on admission)  Assessment & Plan  Grade 2 liver injury of the right lobe. Minimal associated hematoma.  6/1 Continued elevated liver enzymes. US pending.  6/2 Unremarkable ultrasound appearance of the liver.  Serial abdominal exams and laboratory studies stable.    Splenic laceration, initial encounter- (present on  admission)  Assessment & Plan  Grade 3 splenic laceration with small hematoma.  Serial abdominal exams and laboratory studies stable.    Closed bilateral fracture of pubic rami (HCC)- (present on admission)  Assessment & Plan  Pelvic shear injury. Left superior and inferior pubic rami fractures. Right inferior pubic rami fracture and some subtle fracture. Left sacral fracture. Widening of the left SI joint compatible with SI joint injury.  [place] traction applied on admission.  5/27 ORIF pelvis.  5/28 CT cystogram negative for bladder leak.  5/29 Olmos removal.  Weight bearing status - Touch toe weightbearing LLE x 6-8 weeks post op, WB RLE for transfers only.  Manolo Han MD. Orthopedic Surgeon. [facility].    Acute deep vein thrombosis (DVT) of popliteal vein of left lower extremity (HCC)- (present on admission)  Assessment & Plan  Prophylactic anticoagulation for thrombotic prevention initially contraindicated secondary to elevated bleeding risk.   5/27 Cleared for prophylactic anticoagulation per ortho. Will continue to hold secondary to trend down of hemoglobin.  5/28 Trauma screening bilateral lower extremity venous duplex positive for above knee DVT, left popliteal.  5/28 IVC filter placed.  5/31 Xarelto initiated.  Will need outpatient follow up with Anticoagulation clinic upon discharge.    Leukocytosis- (present on admission)  Assessment & Plan  Admission WBC 39.6.  Received intraoperative abx.  5/31 WBC 11.9.  6/1 Elevated to 17.3  6/2 WBC trending down. UA negative.  6/3 WBC trended up, 16.1. Afebrile, nontoxic in appearance.    Contusion of both lungs- (present on admission)  Assessment & Plan  Patchy bilateral pulmonary infiltrates, predominantly on the left.  Supplemental oxygen to maintain SaO2 greater than 95%.  Aggressive pulmonary hygiene and serial chest radiography.    Closed fracture of multiple ribs of both sides- (present on admission)  Assessment & Plan  Left posterior second  through fifth and seventh through 11th rib fractures. Third through eighth rib flail segments.  Posterior right 10th rib fracture.  Aggressive pulmonary hygiene and multimodal pain management.    Primary hypertension  Assessment & Plan  5/30 Metoprolol initiated.  6/1 Change to lisinopril     Ankle fracture, left, closed, initial encounter- (present on admission)  Assessment & Plan  Ossific density adjacent to the medial malleolus, appearance suggests small ligaments avulsion fracture fragment.  Non-operative management.  Weight bearing status - Touch toe weightbearing LLE, left ankle brace or boot as needed for comfort.  Manolo Han MD. Orthopedic Surgeon. [facility].    Closed fracture of transverse process of lumbar vertebra (HCC)- (present on admission)  Assessment & Plan  Left L4 and L5 transverse processes fractures.  Neurosurgery consultation not indicated.  Multimodal pain regimen.    Discharge planning issues- (present on admission)  Assessment & Plan  Date of admission: 5/26/2022.  5/28 Transfer orders from SICU.  5/29 Rehab referral.  Cleared for discharge: Yes - Date: 5/31.  Discharge delayed: No.  Discharge date: tbd.    Trauma- (present on admission)  Assessment & Plan  Helmeted senior care.  Trauma Red Transfer Activation transfer from [facility].  Chapito Meza DO. Trauma Surgery.    Anxiety- (present on admission)  Assessment & Plan  Chronic condition treated with venlafaxine.  Resumed maintenance medication on admission.      Discussed patient condition with RN, Patient and trauma surgery, Dr. Meza.

## 2022-06-04 NOTE — CARE PLAN
Problem: Knowledge Deficit - Standard  Goal: Patient and family/care givers will demonstrate understanding of plan of care, disease process/condition, diagnostic tests and medications  Outcome: Progressing     Problem: Pain - Standard  Goal: Alleviation of pain or a reduction in pain to the patient’s comfort goal  Outcome: Progressing   The patient is Stable - Low risk of patient condition declining or worsening    Shift Goals  Clinical Goals: pain control  Patient Goals: sleep, pain control  Family Goals: Stay up to date    Progress made toward(s) clinical / shift goals:  pts pain managed with prn pain medication.  POC discussed with pt, pt verbalized understanding of plan.

## 2022-06-04 NOTE — DISCHARGE PLANNING
ATTN: Case Management  RE: Referral for Home Health    Reason for referral denial: We are unable to accept today due to insurance capacity - We are rapidly assessing our census for discharge opportunities.  At this time we are only able to accept referrals with SCP insurance.                 Unfortunately, we are not able to accept this referral for the reason listed above. If further clarity is needed, our Transitional Care Specialists are available to discuss any barriers to service at [phone].      We look forward to collaborating with you in the future,  [facility] Team

## 2022-06-04 NOTE — PROGRESS NOTES
Report received from RN, assumed care at 1845  Pt is A0x4, and responds appropriately  Pt declines any SOB, chest pain, new onset of numbness/tingling  Pt rates pain at 8/10, on a scale of 1-10, pt medicated per MAR  Pt is voiding adequately and without hesitancy  Pt has + flatus, + bowel sounds, + BM on 6/3  Pt transfers with x1 assist with FWW to wheelchair. WBAT to RLE, TTWB to LLE  Pt is tolerating a regular diet, denies any nausea/vomiting  Plan of care discussed, all questions answered. Explained importance of calling before getting OOB and pt verbalized understanding. Explained importance of oral care. Call light is within reach, treaded slipper socks on, bed in lowest/locked position, hourly rounding in place, all needs met at this time.

## 2022-06-04 NOTE — PROGRESS NOTES
"Pharmacy Vancomycin Kinetics Note for 6/4/2022     36 y.o. male on Vancomycin day # 1     Vancomycin Indication (AUC Dosing): S. aureus pneumonia    Provider specified end date: 06/11/22    Active Antibiotics (From admission, onward)    Ordered     Ordering Provider       Sat Jun 4, 2022  3:10 PM    06/04/22 1510  vancomycin (VANCOCIN) 2,000 mg in  mL IVPB  (vancomycin (VANCOCIN) IV (LD + Maintenance))  ONCE        Note to Pharmacy: Per P&T Kinetics Protocol    Liz Tapia D.N.P.       Sat Jun 4, 2022  2:49 PM    06/04/22 1449  MD Alert...Vancomycin per Pharmacy  PHARMACY TO DOSE        Question:  Indication(s) for vancomycin?  Answer:  Pneumonia  Comment:  Empyema    BOOKER BonillaP.    06/04/22 1449  piperacillin-tazobactam (ZOSYN) 4.5 g in  mL IVPB  (piperacillin-tazobactam (ZOSYN) IV (Extended-infusion) PANEL )  ONCE        \"And\" Linked Group Details    BOOKER BonillaP.    06/04/22 1449  piperacillin-tazobactam (ZOSYN) 4.5 g in  mL IVPB  (piperacillin-tazobactam (ZOSYN) IV (Extended-infusion) PANEL )  EVERY 8 HOURS        \"And\" Linked Group Details    BOOKER BonillaP.          Dosing Weight: 76.8 kg (169 lb 5 oz)      Admission History: Admitted on 5/26/2022 for Trauma [T14.90XA]  Pertinent history: Pt admitted after USP w/ splenic/liver lac, now hospitalized x9 days with persistently elevated WBC. Vancomycin and Zosyn initiated in setting of concern for empyema    Allergies:     Patient has no known allergies.     Pertinent cultures to date:     Results     Procedure Component Value Units Date/Time    MRSA By PCR (Amp) [[id number]] Collected: 06/04/22 1507    Order Status: Sent Specimen: Respirate from Nares     CULTURE RESPIRATORY W/ GRM STN [[id number]] Collected: 06/04/22 1500    Order Status: Sent Specimen: Respirate from Sputum Induced     BLOOD CULTURE [[id number]]     Order Status: Sent Specimen: Blood from Peripheral     BLOOD CULTURE [[id number]]     " "Order Status: Sent Specimen: Blood from Peripheral     URINALYSIS [[id number]]  (Abnormal) Collected: 06/01/22 1300    Order Status: Completed Specimen: Urine, Clean Catch Updated: 06/01/22 1350     Color Yellow     Character Hazy     Specific Barron 1.014     Ph 8.0     Glucose Negative mg/dL      Ketones Negative mg/dL      Protein Negative mg/dL      Bilirubin Negative     Urobilinogen, Urine 1.0     Nitrite Negative     Leukocyte Esterase Negative     Occult Blood Negative     Micro Urine Req Microscopic    Narrative:      Collected By: [id number] ARREOLA    COV-2, FLU A/B, AND RSV BY PCR (2-4 HOURS CEPClever CloudID): Collect NP swab in [place] [[id number]] Collected: 05/26/22 2226    Order Status: Completed Specimen: Respirate from Nasopharyngeal Updated: 05/27/22 0126     Influenza virus A RNA Negative     Influenza virus B, PCR Negative     RSV, PCR Negative     SARS-CoV-2 by PCR NotDetected     Comment: PATIENTS: Important information regarding your results and instructions can  be found at [email or web address]   \"After your  Covid-19 Test\"    [facility] providers: PLEASE REFER TO DE-ESCALATION AND RETESTING PROTOCOL  on [email or web address]    **The [employer] GeneXpert Xpress SARS-CoV-2 RT-PCR Test has been made  available for use under the Emergency Use Authorization (EUA) only.          SARS-CoV-2 Source NP Swab          Labs:     Estimated Creatinine Clearance: 172.3 mL/min (by C-G formula based on SCr of 0.65 mg/dL).  Recent Labs     06/02/22  0244 06/03/22 0210 06/04/22  0245   WBC 14.9* 13.2* 16.1*   NEUTSPOLYS 63.60 65.00 65.90     Recent Labs     06/02/22  0244 06/03/22  0210 06/04/22  0245   BUN 9 10 12   CREATININE 0.55 0.56 0.65   ALBUMIN 3.7 3.7 4.0       Intake/Output Summary (Last 24 hours) at 6/4/2022 1541  Last data filed at 6/4/2022 1000  Gross per 24 hour   Intake 670 ml   Output 2400 ml   Net -1730 ml      BP (!) 136/94   Pulse (!) 110   Temp 35.9 °C (96.6 °F) (Temporal)   " "Resp 17   Ht 1.905 m (6' 3\")   Wt 76.8 kg (169 lb 5 oz)   SpO2 95%  Temp (24hrs), Av.4 °C (97.6 °F), Min:35.9 °C (96.6 °F), Max:37.1 °C (98.7 °F)      List concerns for Vancomycin clearance:     Nephrotoxic drugs    Pharmacokinetics:     AUC kinetics:   Ke (hr ^-1): 0.1364 hr^-1  Half life: 5.08 hr  Clearance: 6.809  Estimated TDD: 3404.5  Estimated Dose: 1007  Estimated interval: 7.1    A/P:     -  Vancomycin dose: 1,000 mg q8h (0100,0900,1700)    -  Next vancomycin level(s):   TBD pending clinical course     -  Predicted vancomycin AUC from initial AUC test calculator: 441 mg·hr/L    -  Comments: Renal function stable, UOP ~1.6 mL/kg/hr. Low concern for accumulation. MRSA PCR ordered, will follow for results and recommend de-escalation as appropriate.     Savanah Jarvis, PharmD  PGY1 Pharmacy Practice Resident    "

## 2022-06-04 NOTE — CARE PLAN
The patient is Stable - Low risk of patient condition declining or worsening    Shift Goals  Clinical Goals: pain management  Patient Goals: BM, pain control  Family Goals: Stay up to date    Progress made toward(s) clinical / shift goals: pain is gone on R but still persist on left side   Patient is not progressing towards the following goals: N/A    Problem: Knowledge Deficit - Standard  Goal: Patient and family/care givers will demonstrate understanding of plan of care, disease process/condition, diagnostic tests and medications  Outcome: Progressing  Note: Monitor daily chest x-rays and HGB. Pt aware and has no additional questions at this time.      Problem: Pain - Standard  Goal: Alleviation of pain or a reduction in pain to the patient’s comfort goal  Outcome: Progressing  Note: PRN pain medication given for pain management, see MAR. RN will continue to assess.

## 2022-06-04 NOTE — DISCHARGE PLANNING
Received Choice form at 1007  Agency/Facility Name: [facility]  Referral sent per Choice form @ 6110     @1066  Per RN TAMIR LEIGH sent out a blanket referral for HH.

## 2022-06-04 NOTE — PROGRESS NOTES
Received bedside shift report regarding patient and assumed care. Patient is awake, calm and stable, currently positioned in bed for comfort and safety; call light within reach. Denies any pain or discomfort at this time. Pending possible dc home today. Will continue to monitor.

## 2022-06-05 ENCOUNTER — APPOINTMENT (OUTPATIENT)
Dept: RADIOLOGY | Facility: MEDICAL CENTER | Age: 37
DRG: 957 | End: 2022-06-05
Payer: COMMERCIAL

## 2022-06-05 LAB
ALBUMIN SERPL BCP-MCNC: 4.2 G/DL (ref 3.2–4.9)
ALBUMIN/GLOB SERPL: 1.5 G/DL
ALP SERPL-CCNC: 359 U/L (ref 30–99)
ALT SERPL-CCNC: 237 U/L (ref 2–50)
ANION GAP SERPL CALC-SCNC: 11 MMOL/L (ref 7–16)
AST SERPL-CCNC: 112 U/L (ref 12–45)
BASOPHILS # BLD AUTO: 0.3 % (ref 0–1.8)
BASOPHILS # BLD: 0.05 K/UL (ref 0–0.12)
BILIRUB SERPL-MCNC: 2.3 MG/DL (ref 0.1–1.5)
BUN SERPL-MCNC: 12 MG/DL (ref 8–22)
CALCIUM SERPL-MCNC: 9.2 MG/DL (ref 8.5–10.5)
CHLORIDE SERPL-SCNC: 100 MMOL/L (ref 96–112)
CK SERPL-CCNC: 132 U/L (ref 0–154)
CO2 SERPL-SCNC: 25 MMOL/L (ref 20–33)
CREAT SERPL-MCNC: 0.66 MG/DL (ref 0.5–1.4)
EOSINOPHIL # BLD AUTO: 0.48 K/UL (ref 0–0.51)
EOSINOPHIL NFR BLD: 3.3 % (ref 0–6.9)
ERYTHROCYTE [DISTWIDTH] IN BLOOD BY AUTOMATED COUNT: 55.1 FL (ref 35.9–50)
GFR SERPLBLD CREATININE-BSD FMLA CKD-EPI: 125 ML/MIN/1.73 M 2
GLOBULIN SER CALC-MCNC: 2.8 G/DL (ref 1.9–3.5)
GLUCOSE SERPL-MCNC: 109 MG/DL (ref 65–99)
HCT VFR BLD AUTO: 32.6 % (ref 42–52)
HGB BLD-MCNC: 10.7 G/DL (ref 14–18)
IMM GRANULOCYTES # BLD AUTO: 0.45 K/UL (ref 0–0.11)
IMM GRANULOCYTES NFR BLD AUTO: 3.1 % (ref 0–0.9)
LYMPHOCYTES # BLD AUTO: 0.73 K/UL (ref 1–4.8)
LYMPHOCYTES NFR BLD: 5 % (ref 22–41)
MCH RBC QN AUTO: 31.8 PG (ref 27–33)
MCHC RBC AUTO-ENTMCNC: 32.8 G/DL (ref 33.7–35.3)
MCV RBC AUTO: 97 FL (ref 81.4–97.8)
MONOCYTES # BLD AUTO: 1.59 K/UL (ref 0–0.85)
MONOCYTES NFR BLD AUTO: 10.8 % (ref 0–13.4)
NEUTROPHILS # BLD AUTO: 11.43 K/UL (ref 1.82–7.42)
NEUTROPHILS NFR BLD: 77.5 % (ref 44–72)
NRBC # BLD AUTO: 0 K/UL
NRBC BLD-RTO: 0 /100 WBC
PLATELET # BLD AUTO: 450 K/UL (ref 164–446)
PMV BLD AUTO: 9.4 FL (ref 9–12.9)
POTASSIUM SERPL-SCNC: 4 MMOL/L (ref 3.6–5.5)
PROT SERPL-MCNC: 7 G/DL (ref 6–8.2)
RBC # BLD AUTO: 3.36 M/UL (ref 4.7–6.1)
SODIUM SERPL-SCNC: 136 MMOL/L (ref 135–145)
WBC # BLD AUTO: 14.7 K/UL (ref 4.8–10.8)

## 2022-06-05 PROCEDURE — A9270 NON-COVERED ITEM OR SERVICE: HCPCS | Performed by: NURSE PRACTITIONER

## 2022-06-05 PROCEDURE — 700105 HCHG RX REV CODE 258

## 2022-06-05 PROCEDURE — 82550 ASSAY OF CK (CPK): CPT

## 2022-06-05 PROCEDURE — 85025 COMPLETE CBC W/AUTO DIFF WBC: CPT

## 2022-06-05 PROCEDURE — A9270 NON-COVERED ITEM OR SERVICE: HCPCS

## 2022-06-05 PROCEDURE — 700102 HCHG RX REV CODE 250 W/ 637 OVERRIDE(OP)

## 2022-06-05 PROCEDURE — 770001 HCHG ROOM/CARE - MED/SURG/GYN PRIV*

## 2022-06-05 PROCEDURE — A9270 NON-COVERED ITEM OR SERVICE: HCPCS | Performed by: SPECIALIST

## 2022-06-05 PROCEDURE — 99232 SBSQ HOSP IP/OBS MODERATE 35: CPT

## 2022-06-05 PROCEDURE — 700102 HCHG RX REV CODE 250 W/ 637 OVERRIDE(OP): Performed by: SPECIALIST

## 2022-06-05 PROCEDURE — 700102 HCHG RX REV CODE 250 W/ 637 OVERRIDE(OP): Performed by: NURSE PRACTITIONER

## 2022-06-05 PROCEDURE — 80053 COMPREHEN METABOLIC PANEL: CPT

## 2022-06-05 PROCEDURE — 71045 X-RAY EXAM CHEST 1 VIEW: CPT

## 2022-06-05 PROCEDURE — 99232 SBSQ HOSP IP/OBS MODERATE 35: CPT | Mod: FS

## 2022-06-05 PROCEDURE — 700111 HCHG RX REV CODE 636 W/ 250 OVERRIDE (IP)

## 2022-06-05 PROCEDURE — 700101 HCHG RX REV CODE 250: Performed by: NURSE PRACTITIONER

## 2022-06-05 PROCEDURE — 36415 COLL VENOUS BLD VENIPUNCTURE: CPT

## 2022-06-05 RX ORDER — OXYCODONE HYDROCHLORIDE 10 MG/1
10 TABLET ORAL EVERY 4 HOURS PRN
Status: DISCONTINUED | OUTPATIENT
Start: 2022-06-05 | End: 2022-06-07 | Stop reason: HOSPADM

## 2022-06-05 RX ORDER — OXYCODONE HYDROCHLORIDE 5 MG/1
5 TABLET ORAL EVERY 4 HOURS PRN
Status: DISCONTINUED | OUTPATIENT
Start: 2022-06-05 | End: 2022-06-07 | Stop reason: HOSPADM

## 2022-06-05 RX ADMIN — OXYCODONE 5 MG: 5 TABLET ORAL at 12:48

## 2022-06-05 RX ADMIN — PIPERACILLIN AND TAZOBACTAM 4.5 G: 4; .5 INJECTION, POWDER, FOR SOLUTION INTRAVENOUS at 20:34

## 2022-06-05 RX ADMIN — VANCOMYCIN HYDROCHLORIDE 1000 MG: 500 INJECTION, POWDER, LYOPHILIZED, FOR SOLUTION INTRAVENOUS at 02:00

## 2022-06-05 RX ADMIN — METAXALONE 800 MG: 800 TABLET ORAL at 08:31

## 2022-06-05 RX ADMIN — Medication 5 MG: at 20:33

## 2022-06-05 RX ADMIN — PIPERACILLIN AND TAZOBACTAM 4.5 G: 4; .5 INJECTION, POWDER, FOR SOLUTION INTRAVENOUS at 12:48

## 2022-06-05 RX ADMIN — OXYCODONE 5 MG: 5 TABLET ORAL at 17:34

## 2022-06-05 RX ADMIN — METAXALONE 800 MG: 800 TABLET ORAL at 12:48

## 2022-06-05 RX ADMIN — VANCOMYCIN HYDROCHLORIDE 1000 MG: 500 INJECTION, POWDER, LYOPHILIZED, FOR SOLUTION INTRAVENOUS at 10:01

## 2022-06-05 RX ADMIN — GABAPENTIN 300 MG: 300 CAPSULE ORAL at 23:43

## 2022-06-05 RX ADMIN — OXYCODONE 5 MG: 5 TABLET ORAL at 20:30

## 2022-06-05 RX ADMIN — VENLAFAXINE HYDROCHLORIDE 75 MG: 75 CAPSULE, EXTENDED RELEASE ORAL at 05:11

## 2022-06-05 RX ADMIN — VANCOMYCIN HYDROCHLORIDE 1000 MG: 500 INJECTION, POWDER, LYOPHILIZED, FOR SOLUTION INTRAVENOUS at 18:59

## 2022-06-05 RX ADMIN — RIVAROXABAN 15 MG: 15 TABLET, FILM COATED ORAL at 17:34

## 2022-06-05 RX ADMIN — LIDOCAINE 3 PATCH: 50 PATCH TOPICAL at 05:11

## 2022-06-05 RX ADMIN — METAXALONE 800 MG: 800 TABLET ORAL at 17:34

## 2022-06-05 RX ADMIN — GABAPENTIN 300 MG: 300 CAPSULE ORAL at 14:53

## 2022-06-05 RX ADMIN — METAXALONE 800 MG: 800 TABLET ORAL at 20:33

## 2022-06-05 RX ADMIN — RIVAROXABAN 15 MG: 15 TABLET, FILM COATED ORAL at 08:32

## 2022-06-05 RX ADMIN — SENNOSIDES AND DOCUSATE SODIUM 1 TABLET: 50; 8.6 TABLET ORAL at 20:33

## 2022-06-05 RX ADMIN — OXYCODONE HYDROCHLORIDE 10 MG: 10 TABLET ORAL at 08:31

## 2022-06-05 RX ADMIN — GABAPENTIN 300 MG: 300 CAPSULE ORAL at 05:11

## 2022-06-05 RX ADMIN — LISINOPRIL 10 MG: 10 TABLET ORAL at 05:11

## 2022-06-05 RX ADMIN — OXYCODONE HYDROCHLORIDE 10 MG: 10 TABLET ORAL at 02:04

## 2022-06-05 RX ADMIN — PIPERACILLIN AND TAZOBACTAM 4.5 G: 4; .5 INJECTION, POWDER, FOR SOLUTION INTRAVENOUS at 05:10

## 2022-06-05 ASSESSMENT — ENCOUNTER SYMPTOMS
CHILLS: 0
VOMITING: 0
ABDOMINAL PAIN: 0
COUGH: 0
FEVER: 0
TINGLING: 0
ROS GI COMMENTS: LAST BM 6/3
MYALGIAS: 1
BACK PAIN: 0
NECK PAIN: 0
NAUSEA: 0
FOCAL WEAKNESS: 0
SHORTNESS OF BREATH: 0
SENSORY CHANGE: 0

## 2022-06-05 ASSESSMENT — PAIN DESCRIPTION - PAIN TYPE
TYPE: ACUTE PAIN

## 2022-06-05 NOTE — CARE PLAN
Problem: Knowledge Deficit - Standard  Goal: Patient and family/care givers will demonstrate understanding of plan of care, disease process/condition, diagnostic tests and medications  Outcome: Progressing     Problem: Pain - Standard  Goal: Alleviation of pain or a reduction in pain to the patient’s comfort goal  Outcome: Progressing   The patient is Stable - Low risk of patient condition declining or worsening    Shift Goals  Clinical Goals: pain management  Patient Goals: rest, pain control  Family Goals: Stay up to date    Progress made toward(s) clinical / shift goals:  pts pain managed with PRN pain medication. POC discussed with pt, pt verbalized understanding of plan.
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
## 2022-06-05 NOTE — PROGRESS NOTES
Report received from RN, assumed care at 1845  Pt is A0x4, and responds appropriately  Pt declines any SOB, chest pain, new onset of numbness/tingling  Pt rates pain at 8/10, on a scale of 1-10, pt medicated per MAR  Pt is voiding adequately and without hesitancy  Pt has + flatus, + bowel sounds, + BM on 6/4  Pt transfers with standby assist with FWW to wheelchair. WBAT to RLE, TTWB to LLE  Pt is tolerating a regular diet, denies any nausea/vomiting  Plan of care discussed, all questions answered. Explained importance of calling before getting OOB and pt verbalized understanding. Explained importance of oral care. Call light is within reach, treaded slipper socks on, bed in lowest/locked position, hourly rounding in place, all needs met at this time.

## 2022-06-05 NOTE — CARE PLAN
The patient is Stable - Low risk of patient condition declining or worsening    Shift Goals  Clinical Goals: pain control, dc home  Patient Goals: rest and dc home  Family Goals: Stay up to date    Progress made toward(s) clinical / shift goals:  patient well controlled with scheduled and prns. Patient did not DC home today due to abnormal labs. IV abt started with no adverse reaction noted.       Problem: Knowledge Deficit - Standard  Goal: Patient and family/care givers will demonstrate understanding of plan of care, disease process/condition, diagnostic tests and medications  Outcome: Progressing     Problem: Pain - Standard  Goal: Alleviation of pain or a reduction in pain to the patient’s comfort goal  Outcome: Progressing     Problem: Skin Integrity  Goal: Skin integrity is maintained or improved  Outcome: Progressing

## 2022-06-05 NOTE — DISCHARGE PLANNING
Hospital Care Management- Medical Social Work      LMSW reviewed chart. Home health acceptance still pending- anticipate responses tomorrow.

## 2022-06-05 NOTE — PROGRESS NOTES
"   Orthopaedic Progress Note    Interval changes:  Patient doing well  Cleared for DC by ortho pending trauma clearance    ROS - Patient denies any new issues.  Pain well controlled.    /89   Pulse (!) 106   Temp 35.9 °C (96.6 °F) (Temporal)   Resp 17   Ht 1.905 m (6' 3\")   Wt 76.8 kg (169 lb 5 oz)   SpO2 96%       Patient seen and examined  No acute distress  Breathing non labored  RRR  Pelvic dressings CDI, DNVI, moves all toes, cap refill <2 sec.     Recent Labs     06/03/22  0210 06/04/22  0245 06/05/22  0541   WBC 13.2* 16.1* 14.7*   RBC 2.95* 3.23* 3.36*   HEMOGLOBIN 9.3* 10.1* 10.7*   HEMATOCRIT 27.7* 31.2* 32.6*   MCV 93.9 96.6 97.0   MCH 31.5 31.3 31.8   MCHC 33.6* 32.4* 32.8*   RDW 50.5* 54.4* 55.1*   PLATELETCT 308 401 450*   MPV 9.8 9.8 9.4       Active Hospital Problems    Diagnosis    • Closed fracture of multiple ribs of both sides [S22.43XA]      Priority: High   • Contusion of both lungs [S27.322A]      Priority: High   • Leukocytosis [D72.829]      Priority: High   • Acute deep vein thrombosis (DVT) of popliteal vein of left lower extremity (HCC) [I82.432]      Priority: High   • Closed bilateral fracture of pubic rami (HCC) [S32.591A, S32.592A]      Priority: High   • Splenic laceration, initial encounter [S36.039A]      Priority: High   • Liver laceration, initial encounter [S36.113A]      Priority: High   • Primary hypertension [I10]      Priority: Medium   • Ankle fracture, left, closed, initial encounter [S82.892A]      Priority: Medium   • Closed fracture of transverse process of lumbar vertebra (HCC) [S32.009A]      Priority: Medium   • Discharge planning issues [Z02.9]      Priority: Low   • Trauma [T14.90XA]      Priority: Low   • Anxiety [F41.9]      Priority: Low       Assessment/Plan:  Patient doing well  Cleared for DC by ortho pending trauma clearance  POD#8 S/P:   1.  Percutaneous skeletal fixation of left sacroiliac joint fracture dislocation through first sacral " segment.  2.  Percutaneous skeletal fixation of left sacroiliac joint fracture dislocation through second sacral segment.  3.  Percutaneous skeletal fixation of left anterior pelvic fractures.  4.  Percutaneous skeletal fixation of right anterior pelvic fractures.   Wt bearing status - TTWB LLE and transfers only RLE for 6-8 weeks   Wound care/Drains - Dressings to be changed every other day by nursing  Future Procedures - none planned   Sutures/Staples out- 14 days post operatively  PT/OT-initiated  Antibiotics:  Perioperative completed  DVT Prophylaxis- TEDS/SCDs/Foot pumps  Olmos-none  Case Coordination for Discharge Planning - Disposition home vs SNF

## 2022-06-05 NOTE — DISCHARGE PLANNING
Hospital Care Management- Medical Social Work      LMSW left VM for schedulers requesting anti-coagulation clinic appointment for INR checks, as pt is starting Xarelto.     Awaiting call back.     If  is approved,  may also be able to complete lab draws for INR.

## 2022-06-06 ENCOUNTER — APPOINTMENT (OUTPATIENT)
Dept: RADIOLOGY | Facility: MEDICAL CENTER | Age: 37
DRG: 957 | End: 2022-06-06
Attending: ORTHOPAEDIC SURGERY
Payer: COMMERCIAL

## 2022-06-06 ENCOUNTER — APPOINTMENT (OUTPATIENT)
Dept: RADIOLOGY | Facility: MEDICAL CENTER | Age: 37
DRG: 957 | End: 2022-06-06
Payer: COMMERCIAL

## 2022-06-06 ENCOUNTER — APPOINTMENT (OUTPATIENT)
Dept: RADIOLOGY | Facility: MEDICAL CENTER | Age: 37
DRG: 957 | End: 2022-06-06
Attending: SPECIALIST
Payer: COMMERCIAL

## 2022-06-06 LAB
BACTERIA SPEC RESP CULT: NORMAL
GRAM STN SPEC: NORMAL
SIGNIFICANT IND 70042: NORMAL
SITE SITE: NORMAL
SOURCE SOURCE: NORMAL

## 2022-06-06 PROCEDURE — 770001 HCHG ROOM/CARE - MED/SURG/GYN PRIV*

## 2022-06-06 PROCEDURE — 700101 HCHG RX REV CODE 250: Performed by: NURSE PRACTITIONER

## 2022-06-06 PROCEDURE — A9270 NON-COVERED ITEM OR SERVICE: HCPCS

## 2022-06-06 PROCEDURE — 700105 HCHG RX REV CODE 258

## 2022-06-06 PROCEDURE — 93970 EXTREMITY STUDY: CPT

## 2022-06-06 PROCEDURE — 700102 HCHG RX REV CODE 250 W/ 637 OVERRIDE(OP)

## 2022-06-06 PROCEDURE — 93970 EXTREMITY STUDY: CPT | Mod: 26 | Performed by: INTERNAL MEDICINE

## 2022-06-06 PROCEDURE — 72190 X-RAY EXAM OF PELVIS: CPT

## 2022-06-06 PROCEDURE — 99232 SBSQ HOSP IP/OBS MODERATE 35: CPT

## 2022-06-06 PROCEDURE — A9270 NON-COVERED ITEM OR SERVICE: HCPCS | Performed by: SPECIALIST

## 2022-06-06 PROCEDURE — 700102 HCHG RX REV CODE 250 W/ 637 OVERRIDE(OP): Performed by: SPECIALIST

## 2022-06-06 PROCEDURE — 700102 HCHG RX REV CODE 250 W/ 637 OVERRIDE(OP): Performed by: NURSE PRACTITIONER

## 2022-06-06 PROCEDURE — 99238 HOSP IP/OBS DSCHRG MGMT 30/<: CPT

## 2022-06-06 PROCEDURE — A9270 NON-COVERED ITEM OR SERVICE: HCPCS | Performed by: NURSE PRACTITIONER

## 2022-06-06 PROCEDURE — 700111 HCHG RX REV CODE 636 W/ 250 OVERRIDE (IP)

## 2022-06-06 PROCEDURE — 71045 X-RAY EXAM CHEST 1 VIEW: CPT

## 2022-06-06 RX ORDER — DOXYCYCLINE 100 MG/1
100 TABLET ORAL EVERY 12 HOURS
Status: DISCONTINUED | OUTPATIENT
Start: 2022-06-06 | End: 2022-06-07 | Stop reason: HOSPADM

## 2022-06-06 RX ADMIN — OXYCODONE 5 MG: 5 TABLET ORAL at 22:25

## 2022-06-06 RX ADMIN — DOXYCYCLINE 100 MG: 100 TABLET, FILM COATED ORAL at 17:10

## 2022-06-06 RX ADMIN — VENLAFAXINE HYDROCHLORIDE 75 MG: 75 CAPSULE, EXTENDED RELEASE ORAL at 05:40

## 2022-06-06 RX ADMIN — OXYCODONE 5 MG: 5 TABLET ORAL at 08:11

## 2022-06-06 RX ADMIN — ACETAMINOPHEN 650 MG: 325 TABLET ORAL at 23:27

## 2022-06-06 RX ADMIN — SENNOSIDES AND DOCUSATE SODIUM 1 TABLET: 50; 8.6 TABLET ORAL at 22:25

## 2022-06-06 RX ADMIN — RIVAROXABAN 15 MG: 15 TABLET, FILM COATED ORAL at 17:10

## 2022-06-06 RX ADMIN — GABAPENTIN 300 MG: 300 CAPSULE ORAL at 05:34

## 2022-06-06 RX ADMIN — DOCUSATE SODIUM 100 MG: 100 CAPSULE, LIQUID FILLED ORAL at 05:34

## 2022-06-06 RX ADMIN — LISINOPRIL 10 MG: 10 TABLET ORAL at 05:34

## 2022-06-06 RX ADMIN — METAXALONE 800 MG: 800 TABLET ORAL at 17:10

## 2022-06-06 RX ADMIN — METAXALONE 800 MG: 800 TABLET ORAL at 08:11

## 2022-06-06 RX ADMIN — DOCUSATE SODIUM 100 MG: 100 CAPSULE, LIQUID FILLED ORAL at 17:10

## 2022-06-06 RX ADMIN — GABAPENTIN 300 MG: 300 CAPSULE ORAL at 13:19

## 2022-06-06 RX ADMIN — METAXALONE 800 MG: 800 TABLET ORAL at 22:25

## 2022-06-06 RX ADMIN — OXYCODONE 5 MG: 5 TABLET ORAL at 01:52

## 2022-06-06 RX ADMIN — VANCOMYCIN HYDROCHLORIDE 1000 MG: 500 INJECTION, POWDER, LYOPHILIZED, FOR SOLUTION INTRAVENOUS at 03:00

## 2022-06-06 RX ADMIN — VANCOMYCIN HYDROCHLORIDE 1000 MG: 500 INJECTION, POWDER, LYOPHILIZED, FOR SOLUTION INTRAVENOUS at 10:00

## 2022-06-06 RX ADMIN — MAGNESIUM HYDROXIDE 30 ML: 400 SUSPENSION ORAL at 05:33

## 2022-06-06 RX ADMIN — LIDOCAINE 3 PATCH: 50 PATCH TOPICAL at 05:34

## 2022-06-06 RX ADMIN — POLYETHYLENE GLYCOL 3350 1 PACKET: 17 POWDER, FOR SOLUTION ORAL at 17:09

## 2022-06-06 RX ADMIN — OXYCODONE 5 MG: 5 TABLET ORAL at 13:19

## 2022-06-06 RX ADMIN — PIPERACILLIN AND TAZOBACTAM 4.5 G: 4; .5 INJECTION, POWDER, FOR SOLUTION INTRAVENOUS at 05:33

## 2022-06-06 RX ADMIN — RIVAROXABAN 15 MG: 15 TABLET, FILM COATED ORAL at 08:11

## 2022-06-06 RX ADMIN — GABAPENTIN 300 MG: 300 CAPSULE ORAL at 22:25

## 2022-06-06 RX ADMIN — OXYCODONE 5 MG: 5 TABLET ORAL at 18:12

## 2022-06-06 ASSESSMENT — PAIN DESCRIPTION - PAIN TYPE: TYPE: ACUTE PAIN

## 2022-06-06 ASSESSMENT — ENCOUNTER SYMPTOMS
ABDOMINAL PAIN: 0
FEVER: 0
NECK PAIN: 0
BACK PAIN: 0
NAUSEA: 0
CHILLS: 0
SHORTNESS OF BREATH: 0
ROS GI COMMENTS: LAST BM 6/5
COUGH: 0
MYALGIAS: 1
VOMITING: 0
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
## 2022-06-06 NOTE — CARE PLAN
The patient is Stable - Low risk of patient condition declining or worsening    Shift Goals  Clinical Goals: pain management  Patient Goals: rest, pain control  Family Goals: DEANN    Progress made toward(s) clinical / shift goals:      Problem: Knowledge Deficit - Standard  Goal: Patient and family/care givers will demonstrate understanding of plan of care, disease process/condition, diagnostic tests and medications  Outcome: Progressing  Note: Plan of care discussed with patient. Medication education provided. All questions addressed.     Problem: Pain - Standard  Goal: Alleviation of pain or a reduction in pain to the patient’s comfort goal  Outcome: Progressing  Note: Pain level frequently assessed. PRN and scheduled pain medications administered per MAR. Patient reports reduction in pain level post medication administration.

## 2022-06-06 NOTE — CARE PLAN
The patient is Stable - Low risk of patient condition declining or worsening    Shift Goals  Clinical Goals: Pain mangement  Patient Goals: Rest and comfort and pain management  Family Goals: Stay up to date    Progress made toward(s) clinical / shift goals:    Problem: Knowledge Deficit - Standard  Goal: Patient and family/care givers will demonstrate understanding of plan of care, disease process/condition, diagnostic tests and medications  Outcome: Progressing  Note: Verbalizes understanding of in plan of care     Problem: Pain - Standard  Goal: Alleviation of pain or a reduction in pain to the patient’s comfort goal  Outcome: Progressing  Note: Pain is being managed by PRN meds      Problem: Skin Integrity  Goal: Skin integrity is maintained or improved  Outcome: Progressing  Note: Patient skin integrity is being maintained; turns self in bed       Patient is not progressing towards the following goals: n/a

## 2022-06-06 NOTE — PROGRESS NOTES
Bedside report received from nurse. Assumed care of pt. Pt resting comfortably in chair watching tv on laptop. A/Ox4, VSS,  All needs met. POC reviewed and white board updated. Call light in reach.  No pain. Patient is a little anxious due to wife getting ready to give birth.

## 2022-06-06 NOTE — CARE PLAN
The patient is Stable - Low risk of patient condition declining or worsening    Shift Goals  Clinical Goals: Wound care; Pain management  Patient Goals: Ambulation  Family Goals: N/A    Progress made toward(s) clinical / shift goals:    Problem: Knowledge Deficit - Standard  Goal: Patient and family/care givers will demonstrate understanding of plan of care, disease process/condition, diagnostic tests and medications  Outcome: Progressing     Problem: Pain - Standard  Goal: Alleviation of pain or a reduction in pain to the patient’s comfort goal  Outcome: Progressing     Problem: Skin Integrity  Goal: Skin integrity is maintained or improved  Outcome: Progressing       Patient is not progressing towards the following goals:

## 2022-06-06 NOTE — PROGRESS NOTES
Trauma / Surgical Daily Progress Note    Date of Service  6/6/2022    Chief Complaint  36 y.o. male admitted 5/26/2022 with bilateral rib fractures, pulmonary contusions, trace left pneumothorax, grade 2 liver injury, grade 3 spleen injury, unstable pelvic ring fractures, L4/L5 transverse process fractures, left ankle fracture, and acute left popliteal DVT after a care home     POD # 10 ORIF pelvis  POD # 8 IVC filter placement     Interval Events  Bld cx negative.  Adequate pain control.    - Deescalated abx.  - Aggressive pulmonary hygiene.  - Counseled.    Review of Systems  Review of Systems   Constitutional: Negative for chills and fever.   Respiratory: Negative for cough and shortness of breath.    Cardiovascular: Negative for chest pain.   Gastrointestinal: Negative for abdominal pain, nausea and vomiting.        Last BM 6/5   Genitourinary: Negative for dysuria (voiding) and urgency.   Musculoskeletal: Positive for joint pain (pelvic, left ankle) and myalgias (left chest wall). Negative for back pain and neck pain.   All other systems reviewed and are negative.       Vital Signs  Temp:  [36 °C (96.8 °F)-36.7 °C (98 °F)] 36.7 °C (98 °F)  Pulse:  [] 106  Resp:  [16-18] 18  BP: (105-122)/(78-83) 122/83  SpO2:  [93 %-96 %] 93 %    Physical Exam  Physical Exam  Vitals and nursing note reviewed.   Constitutional:       General: He is awake. He is not in acute distress.     Appearance: He is well-developed. He is not ill-appearing.      Comments: Sitting in chair   HENT:      Head: Normocephalic.   Cardiovascular:      Rate and Rhythm: Normal rate.   Pulmonary:      Effort: Pulmonary effort is normal.      Comments: Bilateral back rib pain  Chest:      Chest wall: Tenderness (chest wall ) present.   Abdominal:      General: Bowel sounds are normal. There is no distension.      Palpations: Abdomen is soft.      Tenderness: There is no abdominal tenderness. There is no guarding.   Musculoskeletal:          General: Tenderness present.      Cervical back: Neck supple.      Thoracic back: Spasms and tenderness present.      Comments: Right hip surgical dressing not visualized   Skin:     General: Skin is warm and dry.      Findings: Bruising present.      Comments: Left elbow bruising and swelling   Neurological:      Mental Status: He is alert and oriented to person, place, and time.      GCS: GCS eye subscore is 4. GCS verbal subscore is 5. GCS motor subscore is 6.   Psychiatric:         Mood and Affect: Mood normal.         Behavior: Behavior normal. Behavior is cooperative.         Laboratory  No results found for this or any previous visit (from the past 24 hour(s)).    Fluids    Intake/Output Summary (Last 24 hours) at 6/6/2022 1327  Last data filed at 6/6/2022 1200  Gross per 24 hour   Intake 700 ml   Output 3175 ml   Net -2475 ml       Core Measures & Quality Metrics  Labs reviewed, Medications reviewed and Radiology images reviewed  Olmos catheter: No Olmos      DVT: 5/29 IVC filter placement with Xarelto   DVT prophylaxis - mechanical: SCDs  Ulcer prophylaxis: Not indicated    Assessed for rehab: Patient was assess for and/or received rehabilitation services during this hospitalization    RAP Score Total: 8    ETOH Screening  CAGE Score: 0  Assessment complete date: 5/29/2022 (Admission BA negative, CAGE negative)        Assessment/Plan  Liver laceration, initial encounter- (present on admission)  Assessment & Plan  Grade 2 liver injury of the right lobe. Minimal associated hematoma.  6/1 Continued elevated liver enzymes. US pending.  6/2 Unremarkable ultrasound appearance of the liver.  Serial abdominal exams and laboratory studies stable.    Splenic laceration, initial encounter- (present on admission)  Assessment & Plan  Grade 3 splenic laceration with small hematoma.  Serial abdominal exams and laboratory studies stable.    Closed bilateral fracture of pubic rami (HCC)- (present on admission)  Assessment &  Plan  Pelvic shear injury. Left superior and inferior pubic rami fractures. Right inferior pubic rami fracture and some subtle fracture. Left sacral fracture. Widening of the left SI joint compatible with SI joint injury.  Attala traction applied on admission.  5/27 ORIF pelvis.  5/28 CT cystogram negative for bladder leak.  5/29 Olmos removal.  Weight bearing status - Touch toe weightbearing LLE x 6-8 weeks post op, WB RLE for transfers only.  Manolo Han MD. Orthopedic Surgeon. [facility].    Acute deep vein thrombosis (DVT) of popliteal vein of left lower extremity (HCC)- (present on admission)  Assessment & Plan  Prophylactic anticoagulation for thrombotic prevention initially contraindicated secondary to elevated bleeding risk.   5/27 Cleared for prophylactic anticoagulation per ortho. Will continue to hold secondary to trend down of hemoglobin.  5/28 Trauma screening bilateral lower extremity venous duplex positive for above knee DVT, left popliteal.  5/28 IVC filter placed.  5/31 Xarelto initiated.  Will need outpatient follow up with Anticoagulation clinic upon discharge.    Leukocytosis- (present on admission)  Assessment & Plan  Admission WBC 39.6.  Received intraoperative abx.  5/31 WBC 11.9.  6/1 Elevated to 17.3  6/2 WBC trending down. UA negative.  6/3 WBC trended up, 16.1. Afebrile, nontoxic in appearance.  - CT thorax with small left pleural effusion. It is not loculated. Left pneumothorax has resolved.  - bld cx x 2 preliminary negative.  - sputum cx negative. MRSA swab positive.  - Zosyn and Vanco initiated.  6/5 WBC trending down 14.7. Afebrile, nontoxic in appearance.  6/6 Bld cx x 2 and sputum cx negative. Deescalate antibiotic to doxycycline.    Contusion of both lungs- (present on admission)  Assessment & Plan  Patchy bilateral pulmonary infiltrates, predominantly on the left.  Supplemental oxygen to maintain SaO2 greater than 95%.  Aggressive pulmonary hygiene and serial chest  radiography.    Closed fracture of multiple ribs of both sides- (present on admission)  Assessment & Plan  Left posterior second through fifth and seventh through 11th rib fractures. Third through eighth rib flail segments.  Posterior right 10th rib fracture.  Aggressive pulmonary hygiene and multimodal pain management.    Primary hypertension  Assessment & Plan  5/30 Metoprolol initiated.  6/1 Change to lisinopril     Ankle fracture, left, closed, initial encounter- (present on admission)  Assessment & Plan  Ossific density adjacent to the medial malleolus, appearance suggests small ligaments avulsion fracture fragment.  Non-operative management.  Weight bearing status - Touch toe weightbearing LLE, left ankle brace or boot as needed for comfort.  Manolo Han MD. Orthopedic Surgeon. [facility].    Closed fracture of transverse process of lumbar vertebra (HCC)- (present on admission)  Assessment & Plan  Left L4 and L5 transverse processes fractures.  Neurosurgery consultation not indicated.  Multimodal pain regimen.    Discharge planning issues- (present on admission)  Assessment & Plan  Date of admission: 5/26/2022.  5/28 Transfer orders from SICU.  5/29 Rehab referral.  Cleared for discharge: Yes - Date: 5/31.  Discharge delayed: No.  Discharge date: tbd.    Trauma- (present on admission)  Assessment & Plan  Helmeted detention.  Trauma Red Transfer Activation transfer from [facility].  Chapito Meza DO. Trauma Surgery.    Anxiety- (present on admission)  Assessment & Plan  Chronic condition treated with venlafaxine.  Resumed maintenance medication on admission.      Discussed patient condition with RN, Patient and trauma surgery, Dr. Meza.

## 2022-06-06 NOTE — PROGRESS NOTES
"   Orthopaedic Progress Note    Interval changes:  Patient doing well  Cleared for DC by ortho pending trauma clearance    ROS - Patient denies any new issues.  Pain well controlled.    /89   Pulse (!) 106   Temp 35.9 °C (96.6 °F) (Temporal)   Resp 17   Ht 1.905 m (6' 3\")   Wt 76.8 kg (169 lb 5 oz)   SpO2 96%       Patient seen and examined  No acute distress  Breathing non labored  RRR  Pelvic dressings CDI, DNVI, moves all toes, cap refill <2 sec.     Recent Labs     06/03/22  0210 06/04/22  0245 06/05/22  0541   WBC 13.2* 16.1* 14.7*   RBC 2.95* 3.23* 3.36*   HEMOGLOBIN 9.3* 10.1* 10.7*   HEMATOCRIT 27.7* 31.2* 32.6*   MCV 93.9 96.6 97.0   MCH 31.5 31.3 31.8   MCHC 33.6* 32.4* 32.8*   RDW 50.5* 54.4* 55.1*   PLATELETCT 308 401 450*   MPV 9.8 9.8 9.4       Active Hospital Problems    Diagnosis    • Closed fracture of multiple ribs of both sides [S22.43XA]      Priority: High   • Contusion of both lungs [S27.322A]      Priority: High   • Leukocytosis [D72.829]      Priority: High   • Acute deep vein thrombosis (DVT) of popliteal vein of left lower extremity (HCC) [I82.432]      Priority: High   • Closed bilateral fracture of pubic rami (HCC) [S32.591A, S32.592A]      Priority: High   • Splenic laceration, initial encounter [S36.039A]      Priority: High   • Liver laceration, initial encounter [S36.113A]      Priority: High   • Primary hypertension [I10]      Priority: Medium   • Ankle fracture, left, closed, initial encounter [S82.892A]      Priority: Medium   • Closed fracture of transverse process of lumbar vertebra (HCC) [S32.009A]      Priority: Medium   • Discharge planning issues [Z02.9]      Priority: Low   • Trauma [T14.90XA]      Priority: Low   • Anxiety [F41.9]      Priority: Low       Assessment/Plan:  Patient doing well  Cleared for DC by ortho pending trauma clearance  POD#9 S/P:   1.  Percutaneous skeletal fixation of left sacroiliac joint fracture dislocation through first sacral " segment.  2.  Percutaneous skeletal fixation of left sacroiliac joint fracture dislocation through second sacral segment.  3.  Percutaneous skeletal fixation of left anterior pelvic fractures.  4.  Percutaneous skeletal fixation of right anterior pelvic fractures.   Wt bearing status - TTWB LLE and transfers only RLE for 6-8 weeks   Wound care/Drains - Dressings to be changed every other day by nursing  Future Procedures - none planned   Sutures/Staples out- 14 days post operatively  PT/OT-initiated  Antibiotics:  Perioperative completed  DVT Prophylaxis- TEDS/SCDs/Foot pumps  Olmos-none  Case Coordination for Discharge Planning - Disposition home vs SNF

## 2022-06-07 ENCOUNTER — APPOINTMENT (OUTPATIENT)
Dept: RADIOLOGY | Facility: MEDICAL CENTER | Age: 37
DRG: 957 | End: 2022-06-07
Payer: COMMERCIAL

## 2022-06-07 ENCOUNTER — PHARMACY VISIT (OUTPATIENT)
Dept: PHARMACY | Facility: MEDICAL CENTER | Age: 37
End: 2022-06-07
Payer: COMMERCIAL

## 2022-06-07 VITALS
TEMPERATURE: 98 F | BODY MASS INDEX: 21.05 KG/M2 | HEART RATE: 107 BPM | OXYGEN SATURATION: 99 % | RESPIRATION RATE: 18 BRPM | SYSTOLIC BLOOD PRESSURE: 150 MMHG | WEIGHT: 169.31 LBS | HEIGHT: 75 IN | DIASTOLIC BLOOD PRESSURE: 93 MMHG

## 2022-06-07 LAB
ALBUMIN SERPL BCP-MCNC: 4.4 G/DL (ref 3.2–4.9)
ALBUMIN/GLOB SERPL: 1.4 G/DL
ALP SERPL-CCNC: 411 U/L (ref 30–99)
ALT SERPL-CCNC: 259 U/L (ref 2–50)
ANION GAP SERPL CALC-SCNC: 12 MMOL/L (ref 7–16)
AST SERPL-CCNC: 107 U/L (ref 12–45)
BASOPHILS # BLD AUTO: 0.5 % (ref 0–1.8)
BASOPHILS # BLD: 0.07 K/UL (ref 0–0.12)
BILIRUB SERPL-MCNC: 1.8 MG/DL (ref 0.1–1.5)
BUN SERPL-MCNC: 13 MG/DL (ref 8–22)
CALCIUM SERPL-MCNC: 9.7 MG/DL (ref 8.5–10.5)
CHLORIDE SERPL-SCNC: 100 MMOL/L (ref 96–112)
CO2 SERPL-SCNC: 26 MMOL/L (ref 20–33)
CREAT SERPL-MCNC: 0.68 MG/DL (ref 0.5–1.4)
EOSINOPHIL # BLD AUTO: 0.45 K/UL (ref 0–0.51)
EOSINOPHIL NFR BLD: 3.2 % (ref 0–6.9)
ERYTHROCYTE [DISTWIDTH] IN BLOOD BY AUTOMATED COUNT: 56.7 FL (ref 35.9–50)
GFR SERPLBLD CREATININE-BSD FMLA CKD-EPI: 124 ML/MIN/1.73 M 2
GLOBULIN SER CALC-MCNC: 3.2 G/DL (ref 1.9–3.5)
GLUCOSE SERPL-MCNC: 106 MG/DL (ref 65–99)
HCT VFR BLD AUTO: 34.7 % (ref 42–52)
HGB BLD-MCNC: 11.3 G/DL (ref 14–18)
IMM GRANULOCYTES # BLD AUTO: 0.17 K/UL (ref 0–0.11)
IMM GRANULOCYTES NFR BLD AUTO: 1.2 % (ref 0–0.9)
LYMPHOCYTES # BLD AUTO: 2.33 K/UL (ref 1–4.8)
LYMPHOCYTES NFR BLD: 16.8 % (ref 22–41)
MCH RBC QN AUTO: 31.5 PG (ref 27–33)
MCHC RBC AUTO-ENTMCNC: 32.6 G/DL (ref 33.7–35.3)
MCV RBC AUTO: 96.7 FL (ref 81.4–97.8)
MONOCYTES # BLD AUTO: 1.56 K/UL (ref 0–0.85)
MONOCYTES NFR BLD AUTO: 11.2 % (ref 0–13.4)
NEUTROPHILS # BLD AUTO: 9.29 K/UL (ref 1.82–7.42)
NEUTROPHILS NFR BLD: 67.1 % (ref 44–72)
NRBC # BLD AUTO: 0 K/UL
NRBC BLD-RTO: 0 /100 WBC
PLATELET # BLD AUTO: 521 K/UL (ref 164–446)
PMV BLD AUTO: 9.6 FL (ref 9–12.9)
POTASSIUM SERPL-SCNC: 3.6 MMOL/L (ref 3.6–5.5)
PROT SERPL-MCNC: 7.6 G/DL (ref 6–8.2)
RBC # BLD AUTO: 3.59 M/UL (ref 4.7–6.1)
SODIUM SERPL-SCNC: 138 MMOL/L (ref 135–145)
WBC # BLD AUTO: 13.9 K/UL (ref 4.8–10.8)

## 2022-06-07 PROCEDURE — 700102 HCHG RX REV CODE 250 W/ 637 OVERRIDE(OP): Performed by: NURSE PRACTITIONER

## 2022-06-07 PROCEDURE — 700101 HCHG RX REV CODE 250: Performed by: NURSE PRACTITIONER

## 2022-06-07 PROCEDURE — RXMED WILLOW AMBULATORY MEDICATION CHARGE

## 2022-06-07 PROCEDURE — A9270 NON-COVERED ITEM OR SERVICE: HCPCS | Performed by: NURSE PRACTITIONER

## 2022-06-07 PROCEDURE — 85025 COMPLETE CBC W/AUTO DIFF WBC: CPT

## 2022-06-07 PROCEDURE — 36415 COLL VENOUS BLD VENIPUNCTURE: CPT

## 2022-06-07 PROCEDURE — 700102 HCHG RX REV CODE 250 W/ 637 OVERRIDE(OP)

## 2022-06-07 PROCEDURE — 700102 HCHG RX REV CODE 250 W/ 637 OVERRIDE(OP): Performed by: SPECIALIST

## 2022-06-07 PROCEDURE — 71045 X-RAY EXAM CHEST 1 VIEW: CPT

## 2022-06-07 PROCEDURE — A9270 NON-COVERED ITEM OR SERVICE: HCPCS | Performed by: SPECIALIST

## 2022-06-07 PROCEDURE — 99239 HOSP IP/OBS DSCHRG MGMT >30: CPT

## 2022-06-07 PROCEDURE — A9270 NON-COVERED ITEM OR SERVICE: HCPCS

## 2022-06-07 PROCEDURE — 80053 COMPREHEN METABOLIC PANEL: CPT

## 2022-06-07 RX ORDER — METAXALONE 800 MG/1
800 TABLET ORAL 4 TIMES DAILY
Qty: 56 TABLET | Refills: 0 | Status: SHIPPED | OUTPATIENT
Start: 2022-06-07 | End: 2022-06-10 | Stop reason: SDUPTHER

## 2022-06-07 RX ORDER — GABAPENTIN 300 MG/1
300 CAPSULE ORAL EVERY 8 HOURS
Qty: 21 CAPSULE | Refills: 0 | Status: SHIPPED | OUTPATIENT
Start: 2022-06-07 | End: 2022-06-10 | Stop reason: SDUPTHER

## 2022-06-07 RX ORDER — OXYCODONE HYDROCHLORIDE 5 MG/1
5-10 TABLET ORAL EVERY 4 HOURS PRN
Qty: 28 TABLET | Refills: 0 | Status: SHIPPED | OUTPATIENT
Start: 2022-06-07 | End: 2022-06-10 | Stop reason: SDUPTHER

## 2022-06-07 RX ORDER — DOXYCYCLINE 100 MG/1
100 TABLET ORAL EVERY 12 HOURS
Qty: 6 TABLET | Refills: 0 | Status: SHIPPED | OUTPATIENT
Start: 2022-06-07 | End: 2022-06-10

## 2022-06-07 RX ORDER — LISINOPRIL 10 MG/1
10 TABLET ORAL DAILY
Qty: 30 TABLET | Refills: 0 | Status: SHIPPED | OUTPATIENT
Start: 2022-06-07 | End: 2022-06-28

## 2022-06-07 RX ORDER — AMOXICILLIN 250 MG
1 CAPSULE ORAL NIGHTLY
Qty: 30 TABLET | Refills: 0 | COMMUNITY
Start: 2022-06-07 | End: 2022-06-30

## 2022-06-07 RX ORDER — ACETAMINOPHEN 325 MG/1
500 TABLET ORAL EVERY 6 HOURS PRN
Qty: 30 TABLET | Refills: 0 | COMMUNITY
Start: 2022-06-07 | End: 2022-10-26

## 2022-06-07 RX ADMIN — DOCUSATE SODIUM 100 MG: 100 CAPSULE, LIQUID FILLED ORAL at 04:54

## 2022-06-07 RX ADMIN — DOXYCYCLINE 100 MG: 100 TABLET, FILM COATED ORAL at 04:55

## 2022-06-07 RX ADMIN — GABAPENTIN 300 MG: 300 CAPSULE ORAL at 04:54

## 2022-06-07 RX ADMIN — OXYCODONE HYDROCHLORIDE 10 MG: 10 TABLET ORAL at 08:34

## 2022-06-07 RX ADMIN — MAGNESIUM HYDROXIDE 30 ML: 400 SUSPENSION ORAL at 04:54

## 2022-06-07 RX ADMIN — LISINOPRIL 10 MG: 10 TABLET ORAL at 04:55

## 2022-06-07 RX ADMIN — GABAPENTIN 300 MG: 300 CAPSULE ORAL at 13:12

## 2022-06-07 RX ADMIN — RIVAROXABAN 15 MG: 15 TABLET, FILM COATED ORAL at 08:34

## 2022-06-07 RX ADMIN — METAXALONE 800 MG: 800 TABLET ORAL at 13:11

## 2022-06-07 RX ADMIN — LIDOCAINE 3 PATCH: 50 PATCH TOPICAL at 04:55

## 2022-06-07 RX ADMIN — OXYCODONE HYDROCHLORIDE 10 MG: 10 TABLET ORAL at 13:11

## 2022-06-07 RX ADMIN — VENLAFAXINE HYDROCHLORIDE 75 MG: 75 CAPSULE, EXTENDED RELEASE ORAL at 04:55

## 2022-06-07 RX ADMIN — METAXALONE 800 MG: 800 TABLET ORAL at 08:34

## 2022-06-07 RX ADMIN — OXYCODONE HYDROCHLORIDE 10 MG: 10 TABLET ORAL at 04:01

## 2022-06-07 ASSESSMENT — PAIN DESCRIPTION - PAIN TYPE: TYPE: ACUTE PAIN

## 2022-06-07 NOTE — PROGRESS NOTES
Patient discharged to home with wife and staff escort. IV discontinued. Prescriptions given to patient, verbalized understanding, consent signed and in chart. Discharge instructions given regarding follow-up appointments, weight-bearing restrictions, and signs and symptoms of infection. Emphasis was placed on the need to discuss CT thorax with primary care provider.  Education provided, patient asked questions and verbalized understanding. Discharge paperwork signed and in chart. Patient is tolerating diet, stable when mobilizing to the wheelchair, and pain is well controlled. All belongings collected. No further questions or concerns at this time. Wheelchair, walker, medications, and boot all with the patient at the time of discharge.

## 2022-06-07 NOTE — DISCHARGE INSTRUCTIONS
- Call or seek medical attention for questions or concerns  - Follow up with Dr. Meza in 1 week OR as needed.  - Follow up with Dr. Han in 2 weeks time  - Follow up in Anticoagulation clinic for management of the Xarelto (due to blood clot above the knee)  - Follow up with primary care provider within one weeks time. Make sure to discuss CT thorax results in addition to all diagnoses during this hospital stay.  - Toe Touch Weight Bearing Left Lower Extremity x 6-8 weeks postop  - Weight Bearing Right Lower Extremity for transfers only  - Left ankle brace or boot PRN comfort when out of bed  - Resume regular diet  - May take over the counter acetaminophen or ibuprofen as needed for pain  - Continue daily over the counter stool softener while on narcotics  - No operation of machinery or motorized vehicles while under the influence of narcotics  - No alcohol, marijuana or illicit drug use while under the influence of narcotics  - In the event of a narcotic overdose naloxone (Narcan) is available without a prescription from any [facility] or [facility]  - No swimming, hot tubs, baths or wound submersion until cleared by outpatient provider. May shower  - No contact sports, strenuous activities, or heavy lifting until cleared by outpatient provider  - If respiratory decompensation, persistent or worsening pain, neurological symptoms, or signs or symptoms of infection occur seek medical attention    Discharge Instructions    Discharged to home by car with relative. Discharged via wheelchair, hospital escort: Yes.  Special equipment needed: Not Applicable    Be sure to schedule a follow-up appointment with your primary care doctor or any specialists as instructed.     Discharge Plan:   Diet Plan: Discussed  Activity Level: Discussed  Confirmed Follow up Appointment: Patient to Call and Schedule Appointment  Confirmed Symptoms Management: Discussed  Medication Reconciliation Updated: Yes    I understand that a  diet low in cholesterol, fat, and sodium is recommended for good health. Unless I have been given specific instructions below for another diet, I accept this instruction as my diet prescription.   Other diet: Regular    Special Instructions: None    Is patient discharged on Warfarin / Coumadin?   No   Rivaroxaban oral tablets  What is this medicine?  RIVAROXABAN (ri va ZOE a ban) is an anticoagulant (blood thinner). It is used to treat blood clots in the lungs or in the veins. It is also used to prevent blood clots in the lungs or in the veins. It is also used to lower the chance of stroke in people with a medical condition called atrial fibrillation.  This medicine may be used for other purposes; ask your health care provider or pharmacist if you have questions.  COMMON BRAND NAME(S): Xarelto, Xarelto Starter Pack  What should I tell my health care provider before I take this medicine?  They need to know if you have any of these conditions:  antiphospholipid antibody syndrome  artificial heart valve  bleeding disorders  bleeding in the brain  blood in your stools (black or tarry stools) or if you have blood in your vomit  history of blood clots  history of stomach bleeding  kidney disease  liver disease  low blood counts, like low white cell, platelet, or red cell counts  recent or planned spinal or epidural procedure  take medicines that treat or prevent blood clots  an unusual or allergic reaction to rivaroxaban, other medicines, foods, dyes, or preservatives  pregnant or trying to get pregnant  breast-feeding  How should I use this medicine?  Take this medicine by mouth with a glass of water. Follow the directions on the prescription label. Take your medicine at regular intervals. Do not take it more often than directed. Do not stop taking except on your doctor's advice. Stopping this medicine may increase your risk of a blood clot. Be sure to refill your prescription before you run out of medicine.  If you are  taking this medicine after hip or knee replacement surgery, take it with or without food. If you are taking this medicine for atrial fibrillation, take it with your evening meal. If you are taking this medicine to treat blood clots, take it with food at the same time each day. If you are unable to swallow your tablet, you may crush the tablet and mix it in applesauce. Then, immediately eat the applesauce. You should eat more food right after you eat the applesauce containing the crushed tablet.  Talk to your pediatrician regarding the use of this medicine in children. Special care may be needed.  Overdosage: If you think you have taken too much of this medicine contact a poison control center or emergency room at once.  NOTE: This medicine is only for you. Do not share this medicine with others.  What if I miss a dose?  If you take your medicine once a day and miss a dose, take the missed dose as soon as you remember. If it is almost time for your next dose, take only that dose. Do not take double or extra doses.  If you take your medicine twice a day and miss a dose, take the missed dose immediately. In this instance, 2 tablets may be taken at the same time. The next day you should take 1 tablet twice a day as directed.  What may interact with this medicine?  Do not take this medicine with any of the following medications:  defibrotide  This medicine may also interact with the following medications:  aspirin and aspirin-like medicines  certain antibiotics like erythromycin, azithromycin, and clarithromycin  certain medicines for fungal infections like ketoconazole and itraconazole  certain medicines for irregular heart beat like amiodarone, quinidine, dronedarone  certain medicines for seizures like carbamazepine, phenytoin  certain medicines that treat or prevent blood clots like warfarin, enoxaparin, and dalteparin  conivaptan  felodipine  indinavir  lopinavir; ritonavir  NSAIDS, medicines for pain and  inflammation, like ibuprofen or naproxen  ranolazine  rifampin  ritonavir  SNRIs, medicines for depression, like desvenlafaxine, duloxetine, levomilnacipran, venlafaxine  SSRIs, medicines for depression, like citalopram, escitalopram, fluoxetine, fluvoxamine, paroxetine, sertraline  Clover's wort  verapamil  This list may not describe all possible interactions. Give your health care provider a list of all the medicines, herbs, non-prescription drugs, or dietary supplements you use. Also tell them if you smoke, drink alcohol, or use illegal drugs. Some items may interact with your medicine.  What should I watch for while using this medicine?  Visit your healthcare professional for regular checks on your progress. You may need blood work done while you are taking this medicine. Your condition will be monitored carefully while you are receiving this medicine. It is important not to miss any appointments.  Avoid sports and activities that might cause injury while you are using this medicine. Severe falls or injuries can cause unseen bleeding. Be careful when using sharp tools or knives. Consider using an electric razor. Take special care brushing or flossing your teeth. Report any injuries, bruising, or red spots on the skin to your healthcare professional.  If you are going to need surgery or other procedure, tell your healthcare professional that you are taking this medicine.  Wear a medical ID bracelet or chain. Carry a card that describes your disease and details of your medicine and dosage times.  What side effects may I notice from receiving this medicine?  Side effects that you should report to your doctor or health care professional as soon as possible:  allergic reactions like skin rash, itching or hives, swelling of the face, lips, or tongue  back pain  redness, blistering, peeling or loosening of the skin, including inside the mouth  signs and symptoms of bleeding such as bloody or black, tarry stools; red  or dark-brown urine; spitting up blood or brown material that looks like coffee grounds; red spots on the skin; unusual bruising or bleeding from the eye, gums, or nose  signs and symptoms of a blood clot such as chest pain; shortness of breath; pain, swelling, or warmth in the leg  signs and symptoms of a stroke such as changes in vision; confusion; trouble speaking or understanding; severe headaches; sudden numbness or weakness of the face, arm or leg; trouble walking; dizziness; loss of coordination  Side effects that usually do not require medical attention (report to your doctor or health care professional if they continue or are bothersome):  dizziness  muscle pain  This list may not describe all possible side effects. Call your doctor for medical advice about side effects. You may report side effects to FDA at 4-552-FJG-6743.  Where should I keep my medicine?  Keep out of the reach of children.  Store at room temperature between 15 and 30 degrees C (59 and 86 degrees F). Throw away any unused medicine after the expiration date.  NOTE: This sheet is a summary. It may not cover all possible information. If you have questions about this medicine, talk to your doctor, pharmacist, or health care provider.  © 2020 [employer]vier/Gold Standard (2020-03-16 09:45:59)      Depression / Suicide Risk    As you are discharged from this [facility] facility, it is important to learn how to keep safe from harming yourself.    Recognize the warning signs:  Abrupt changes in personality, positive or negative- including increase in energy   Giving away possessions  Change in eating patterns- significant weight changes-  positive or negative  Change in sleeping patterns- unable to sleep or sleeping all the time   Unwillingness or inability to communicate  Depression  Unusual sadness, discouragement and loneliness  Talk of wanting to die  Neglect of personal appearance   Rebelliousness- reckless behavior  Withdrawal from  people/activities they love  Confusion- inability to concentrate     If you or a loved one observes any of these behaviors or has concerns about self-harm, here's what you can do:  Talk about it- your feelings and reasons for harming yourself  Remove any means that you might use to hurt yourself (examples: pills, rope, extension cords, firearm)  Get professional help from the community (Mental Health, Substance Abuse, psychological counseling)  Do not be alone:Call your Safe Contact- someone whom you trust who will be there for you.  Call your local CRISIS HOTLINE 038-0191 or 778-304-5143  Call your local Children's Mobile Crisis Response Team [place] (700) 154-0343 or www.Swarm  Call the toll free National Suicide Prevention Hotlines   National Suicide Prevention Lifeline 217-033-XQJW (6277)  National Hope Line Network 800-SUICIDE (496-9778)

## 2022-06-07 NOTE — PROGRESS NOTES
Received report from night shift RN. Assumed patient care.    Patient is A+O x4.  Tolerating regular diet. Denies N/V.  Denies chest pain or SOB.  Pain 7 on a 0-10 pain scale.   + void, + BM. Last BM 6/7.  Patient ambulates standby to wheelchair or chair with FWW.     Plan of care discussed, all questions answered. Educated on the importance of calling before getting OOB and pt verbalizes understanding. Educated regarding importance of oral care. Oral care kit at bedside. Call light is within reach, treaded slipper socks on, bed in lowest/ locked position, hourly rounding in place, all needs met at this time

## 2022-06-07 NOTE — DISCHARGE PLANNING
Case Management Discharge Planning    Admission Date: 5/26/2022  GMLOS: 7.7  ALOS: 11    6-Clicks ADL Score: 21  6-Clicks Mobility Score: 13  PT and/or OT Eval ordered: Yes  Post-acute Referrals Ordered: Yes  Post-acute Choice Obtained: Yes  Has referral(s) been sent to post-acute provider:Yes      Anticipated Discharge Dispo: Discharge Disposition: Discharged to home/self care with close OP Follow Up    DME Needed: None    Action(s) Taken:   Pt was discussed in ICT rounds today and informed Liz APONTE, that Pt has no accepting Home Health . Per Liz Pt has an appointment with his PCP on 6/10/22 .    This RN CM left a VM to the Coumadin Clinic verifying if Pt can get an appointment for monitoring, awaiting response.    Informed BARRY Goel of this update.       Escalations Completed: None    Medically Clear: Yes    Next Steps:   This RN CM to continue to assist Pt with discharge as needed    Barriers to Discharge: None    Is the patient up for discharge tomorrow: No

## 2022-06-07 NOTE — DISCHARGE PLANNING
Agency/Facility Name: [facility]   Outcome: Voicemail left on DPA Heather's phone stating that pt is declined due to non -covered service area and non contracted insurance.     RN REESE Medina notified.     @0918-  BARRY Eid requested referral expanded to [facility], [facility].     @[id number]-  Agency/Facility Name: [facility]   Spoke To: Dong   Outcome: Per Dong pt is declined due to service area. Recommended sending to [facility].     DPA to send to [facility].     @1053-  Agency/Facility Name: [facility]  Outcome: DPA received voicemail that pt was declined because they are not contracted with pt's insurance.
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
## 2022-06-07 NOTE — CARE PLAN
The patient is Stable - Low risk of patient condition declining or worsening    Shift Goals  Clinical Goals: pain control  Patient Goals: pain control, rest  Family Goals: N/A    Progress made toward(s) clinical / shift goals:  pt pain medicated per MAR    Problem: Pain - Standard  Goal: Alleviation of pain or a reduction in pain to the patient’s comfort goal  Outcome: Progressing     Problem: Skin Integrity  Goal: Skin integrity is maintained or improved  Outcome: Progressing     Problem: Knowledge Deficit - Standard  Goal: Patient and family/care givers will demonstrate understanding of plan of care, disease process/condition, diagnostic tests and medications  Outcome: Progressing       Patient is not progressing towards the following goals:

## 2022-06-07 NOTE — CARE PLAN
The patient is Stable - Low risk of patient condition declining or worsening    Shift Goals  Clinical Goals: Pain control, staples out, oob activity  Patient Goals: Pain control, staples out, discharge  Family Goals: No family present at this time.    Problem: Knowledge Deficit - Standard  Goal: Patient and family/care givers will demonstrate understanding of plan of care, disease process/condition, diagnostic tests and medications  Outcome: Progressing     Problem: Pain - Standard  Goal: Alleviation of pain or a reduction in pain to the patient’s comfort goal  Outcome: Progressing     Progress made toward(s) clinical / shift goals:      Patient updated on current plan of care and verbalizes understanding.   Pain is controlled with current medications per MAR.     Patient is not progressing towards the following goals:
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
## 2022-06-07 NOTE — PROGRESS NOTES
Pelvis xrays from 6/6 show stable, acceptable pelvic ring alignment and fixation.  Continue current plan of care.  Fu 2-4 weeks after discharge.  Staples out prior to discharge.

## 2022-06-09 ENCOUNTER — DOCUMENTATION (OUTPATIENT)
Dept: VASCULAR LAB | Facility: MEDICAL CENTER | Age: 37
End: 2022-06-09

## 2022-06-09 ENCOUNTER — ANTICOAGULATION VISIT (OUTPATIENT)
Dept: VASCULAR LAB | Facility: MEDICAL CENTER | Age: 37
End: 2022-06-09
Attending: INTERNAL MEDICINE
Payer: COMMERCIAL

## 2022-06-09 ENCOUNTER — DOCUMENTATION (OUTPATIENT)
Dept: VASCULAR LAB | Facility: MEDICAL CENTER | Age: 37
End: 2022-06-09
Payer: OTHER MISCELLANEOUS

## 2022-06-09 VITALS — DIASTOLIC BLOOD PRESSURE: 59 MMHG | HEART RATE: 116 BPM | SYSTOLIC BLOOD PRESSURE: 93 MMHG

## 2022-06-09 DIAGNOSIS — I82.432 ACUTE DEEP VEIN THROMBOSIS (DVT) OF POPLITEAL VEIN OF LEFT LOWER EXTREMITY (HCC): ICD-10-CM

## 2022-06-09 LAB
BACTERIA BLD CULT: NORMAL
BACTERIA BLD CULT: NORMAL
SIGNIFICANT IND 70042: NORMAL
SIGNIFICANT IND 70042: NORMAL
SITE SITE: NORMAL
SITE SITE: NORMAL
SOURCE SOURCE: NORMAL
SOURCE SOURCE: NORMAL

## 2022-06-09 PROCEDURE — 99213 OFFICE O/P EST LOW 20 MIN: CPT

## 2022-06-09 ASSESSMENT — CHA2DS2 SCORE
CHA2DS2 VASC SCORE: 0
PRIOR STROKE OR TIA OR THROMBOEMBOLISM: NO
DIABETES: NO
AGE 75 OR GREATER: NO
AGE 65 TO 74: NO
CHF OR LEFT VENTRICULAR DYSFUNCTION: NO
VASCULAR DISEASE: NO
SEX: MALE
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
## 2022-06-10 ENCOUNTER — OFFICE VISIT (OUTPATIENT)
Dept: MEDICAL GROUP | Facility: PHYSICIAN GROUP | Age: 37
End: 2022-06-10
Payer: OTHER MISCELLANEOUS

## 2022-06-10 VITALS
HEIGHT: 75 IN | WEIGHT: 160.3 LBS | OXYGEN SATURATION: 93 % | DIASTOLIC BLOOD PRESSURE: 72 MMHG | HEART RATE: 110 BPM | TEMPERATURE: 98.8 F | RESPIRATION RATE: 18 BRPM | SYSTOLIC BLOOD PRESSURE: 110 MMHG | BODY MASS INDEX: 19.93 KG/M2

## 2022-06-10 DIAGNOSIS — S32.9XXK CLOSED DISPLACED FRACTURE OF PELVIS WITH NONUNION, UNSPECIFIED PART OF PELVIS, SUBSEQUENT ENCOUNTER: ICD-10-CM

## 2022-06-10 DIAGNOSIS — I10 ESSENTIAL HYPERTENSION: ICD-10-CM

## 2022-06-10 DIAGNOSIS — D64.9 ANEMIA, UNSPECIFIED TYPE: ICD-10-CM

## 2022-06-10 DIAGNOSIS — R74.01 TRANSAMINITIS: ICD-10-CM

## 2022-06-10 DIAGNOSIS — S22.43XA CLOSED FRACTURE OF MULTIPLE RIBS OF BOTH SIDES, INITIAL ENCOUNTER: ICD-10-CM

## 2022-06-10 DIAGNOSIS — Z09 HOSPITAL DISCHARGE FOLLOW-UP: ICD-10-CM

## 2022-06-10 DIAGNOSIS — S82.892A ANKLE FRACTURE, LEFT, CLOSED, INITIAL ENCOUNTER: ICD-10-CM

## 2022-06-10 DIAGNOSIS — S32.009A CLOSED FRACTURE OF TRANSVERSE PROCESS OF LUMBAR VERTEBRA, INITIAL ENCOUNTER (HCC): ICD-10-CM

## 2022-06-10 DIAGNOSIS — T14.90XA TRAUMA: ICD-10-CM

## 2022-06-10 DIAGNOSIS — S22.42XA CLOSED FRACTURE OF MULTIPLE RIBS OF LEFT SIDE, INITIAL ENCOUNTER: ICD-10-CM

## 2022-06-10 PROCEDURE — 99214 OFFICE O/P EST MOD 30 MIN: CPT | Performed by: NURSE PRACTITIONER

## 2022-06-10 RX ORDER — GABAPENTIN 300 MG/1
300 CAPSULE ORAL EVERY 8 HOURS
Qty: 90 CAPSULE | Refills: 2 | Status: SHIPPED | OUTPATIENT
Start: 2022-06-10 | End: 2022-06-28 | Stop reason: SDUPTHER

## 2022-06-10 RX ORDER — METAXALONE 800 MG/1
800 TABLET ORAL 4 TIMES DAILY
Qty: 120 TABLET | Refills: 1 | Status: SHIPPED | OUTPATIENT
Start: 2022-06-10 | End: 2022-07-21 | Stop reason: SDUPTHER

## 2022-06-10 ASSESSMENT — FIBROSIS 4 INDEX: FIB4 SCORE: 0.45
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
## 2022-06-10 NOTE — PATIENT INSTRUCTIONS
Start Joycelyn-Colace/Senokot once a day in the evening routinely while taking narcotics.    Take MiraLAX once a day as needed for constipation.    Get chest x-ray done for next appointment with me    Fasting labs done few days before your next appointment with me

## 2022-06-11 PROBLEM — Z09 HOSPITAL DISCHARGE FOLLOW-UP: Status: ACTIVE | Noted: 2022-06-11
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
## 2022-06-13 ENCOUNTER — HOSPITAL ENCOUNTER (EMERGENCY)
Facility: MEDICAL CENTER | Age: 37
End: 2022-06-14
Attending: EMERGENCY MEDICINE
Payer: COMMERCIAL

## 2022-06-13 DIAGNOSIS — S22.5XXD CLOSED FRACTURE OF MULTIPLE RIBS WITH FLAIL CHEST WITH ROUTINE HEALING, SUBSEQUENT ENCOUNTER: ICD-10-CM

## 2022-06-13 DIAGNOSIS — R07.89 CHEST WALL PAIN: ICD-10-CM

## 2022-06-13 PROCEDURE — 93005 ELECTROCARDIOGRAM TRACING: CPT | Performed by: EMERGENCY MEDICINE

## 2022-06-13 PROCEDURE — 93005 ELECTROCARDIOGRAM TRACING: CPT

## 2022-06-13 PROCEDURE — 99284 EMERGENCY DEPT VISIT MOD MDM: CPT

## 2022-06-13 ASSESSMENT — FIBROSIS 4 INDEX: FIB4 SCORE: 0.45

## 2022-06-14 ENCOUNTER — TELEPHONE (OUTPATIENT)
Dept: MEDICAL GROUP | Facility: PHYSICIAN GROUP | Age: 37
End: 2022-06-14
Payer: OTHER MISCELLANEOUS

## 2022-06-14 ENCOUNTER — APPOINTMENT (OUTPATIENT)
Dept: RADIOLOGY | Facility: MEDICAL CENTER | Age: 37
End: 2022-06-14
Attending: EMERGENCY MEDICINE
Payer: COMMERCIAL

## 2022-06-14 VITALS
BODY MASS INDEX: 20.53 KG/M2 | RESPIRATION RATE: 20 BRPM | HEIGHT: 74 IN | TEMPERATURE: 97.9 F | SYSTOLIC BLOOD PRESSURE: 111 MMHG | OXYGEN SATURATION: 95 % | HEART RATE: 90 BPM | WEIGHT: 160 LBS | DIASTOLIC BLOOD PRESSURE: 64 MMHG

## 2022-06-14 DIAGNOSIS — S22.43XA CLOSED FRACTURE OF MULTIPLE RIBS OF BOTH SIDES, INITIAL ENCOUNTER: ICD-10-CM

## 2022-06-14 DIAGNOSIS — S32.592A CLOSED BILATERAL FRACTURE OF PUBIC RAMI, INITIAL ENCOUNTER (HCC): ICD-10-CM

## 2022-06-14 DIAGNOSIS — S32.591A CLOSED BILATERAL FRACTURE OF PUBIC RAMI, INITIAL ENCOUNTER (HCC): ICD-10-CM

## 2022-06-14 DIAGNOSIS — S32.009A CLOSED FRACTURE OF TRANSVERSE PROCESS OF LUMBAR VERTEBRA, INITIAL ENCOUNTER (HCC): ICD-10-CM

## 2022-06-14 LAB
ALBUMIN SERPL BCP-MCNC: 4.3 G/DL (ref 3.2–4.9)
ALBUMIN/GLOB SERPL: 1.4 G/DL
ALP SERPL-CCNC: 501 U/L (ref 30–99)
ALT SERPL-CCNC: 153 U/L (ref 2–50)
ANION GAP SERPL CALC-SCNC: 12 MMOL/L (ref 7–16)
AST SERPL-CCNC: 63 U/L (ref 12–45)
BASOPHILS # BLD AUTO: 0.9 % (ref 0–1.8)
BASOPHILS # BLD: 0.06 K/UL (ref 0–0.12)
BILIRUB SERPL-MCNC: 0.8 MG/DL (ref 0.1–1.5)
BUN SERPL-MCNC: 11 MG/DL (ref 8–22)
CALCIUM SERPL-MCNC: 9.5 MG/DL (ref 8.5–10.5)
CHLORIDE SERPL-SCNC: 100 MMOL/L (ref 96–112)
CHLORIDE SERPL-SCNC: 98 MMOL/L (ref 96–112)
CO2 SERPL-SCNC: 26 MMOL/L (ref 20–33)
CREAT SERPL-MCNC: 0.84 MG/DL (ref 0.5–1.4)
EKG IMPRESSION: NORMAL
EOSINOPHIL # BLD AUTO: 0.26 K/UL (ref 0–0.51)
EOSINOPHIL NFR BLD: 3.8 % (ref 0–6.9)
ERYTHROCYTE [DISTWIDTH] IN BLOOD BY AUTOMATED COUNT: 50.9 FL (ref 35.9–50)
GFR SERPLBLD CREATININE-BSD FMLA CKD-EPI: 116 ML/MIN/1.73 M 2
GLOBULIN SER CALC-MCNC: 3.1 G/DL (ref 1.9–3.5)
GLUCOSE SERPL-MCNC: 103 MG/DL (ref 65–99)
HCT VFR BLD AUTO: 34.9 % (ref 42–52)
HGB BLD-MCNC: 11.8 G/DL (ref 14–18)
IMM GRANULOCYTES # BLD AUTO: 0.02 K/UL (ref 0–0.11)
IMM GRANULOCYTES NFR BLD AUTO: 0.3 % (ref 0–0.9)
LIPASE SERPL-CCNC: 151 U/L (ref 11–82)
LYMPHOCYTES # BLD AUTO: 2.21 K/UL (ref 1–4.8)
LYMPHOCYTES NFR BLD: 32.5 % (ref 22–41)
MCH RBC QN AUTO: 32.2 PG (ref 27–33)
MCHC RBC AUTO-ENTMCNC: 33.8 G/DL (ref 33.7–35.3)
MCV RBC AUTO: 95.1 FL (ref 81.4–97.8)
MONOCYTES # BLD AUTO: 1.02 K/UL (ref 0–0.85)
MONOCYTES NFR BLD AUTO: 15 % (ref 0–13.4)
NEUTROPHILS # BLD AUTO: 3.24 K/UL (ref 1.82–7.42)
NEUTROPHILS NFR BLD: 47.5 % (ref 44–72)
NRBC # BLD AUTO: 0 K/UL
NRBC BLD-RTO: 0 /100 WBC
PLATELET # BLD AUTO: 517 K/UL (ref 164–446)
PMV BLD AUTO: 9.2 FL (ref 9–12.9)
POTASSIUM SERPL-SCNC: 3.9 MMOL/L (ref 3.6–5.5)
POTASSIUM SERPL-SCNC: 4.1 MMOL/L (ref 3.6–5.5)
PROT SERPL-MCNC: 7.4 G/DL (ref 6–8.2)
RBC # BLD AUTO: 3.67 M/UL (ref 4.7–6.1)
SODIUM SERPL-SCNC: 136 MMOL/L (ref 135–145)
SODIUM SERPL-SCNC: 138 MMOL/L (ref 135–145)
WBC # BLD AUTO: 6.8 K/UL (ref 4.8–10.8)

## 2022-06-14 PROCEDURE — 36415 COLL VENOUS BLD VENIPUNCTURE: CPT

## 2022-06-14 PROCEDURE — 82435 ASSAY OF BLOOD CHLORIDE: CPT

## 2022-06-14 PROCEDURE — 700111 HCHG RX REV CODE 636 W/ 250 OVERRIDE (IP): Performed by: EMERGENCY MEDICINE

## 2022-06-14 PROCEDURE — 83690 ASSAY OF LIPASE: CPT

## 2022-06-14 PROCEDURE — 71046 X-RAY EXAM CHEST 2 VIEWS: CPT

## 2022-06-14 PROCEDURE — A9270 NON-COVERED ITEM OR SERVICE: HCPCS | Performed by: EMERGENCY MEDICINE

## 2022-06-14 PROCEDURE — 96374 THER/PROPH/DIAG INJ IV PUSH: CPT

## 2022-06-14 PROCEDURE — 84132 ASSAY OF SERUM POTASSIUM: CPT

## 2022-06-14 PROCEDURE — 700102 HCHG RX REV CODE 250 W/ 637 OVERRIDE(OP): Performed by: EMERGENCY MEDICINE

## 2022-06-14 PROCEDURE — 85025 COMPLETE CBC W/AUTO DIFF WBC: CPT

## 2022-06-14 PROCEDURE — 84295 ASSAY OF SERUM SODIUM: CPT

## 2022-06-14 PROCEDURE — 80053 COMPREHEN METABOLIC PANEL: CPT

## 2022-06-14 RX ORDER — OXYCODONE HYDROCHLORIDE 5 MG/1
5-7.5 TABLET ORAL EVERY 4 HOURS PRN
Qty: 105 TABLET | Refills: 0 | Status: SHIPPED | OUTPATIENT
Start: 2022-06-14 | End: 2022-06-30 | Stop reason: SDUPTHER

## 2022-06-14 RX ORDER — MORPHINE SULFATE 4 MG/ML
4 INJECTION INTRAVENOUS ONCE
Status: COMPLETED | OUTPATIENT
Start: 2022-06-14 | End: 2022-06-14

## 2022-06-14 RX ORDER — OXYCODONE HYDROCHLORIDE 5 MG/1
5 TABLET ORAL ONCE
Status: COMPLETED | OUTPATIENT
Start: 2022-06-14 | End: 2022-06-14

## 2022-06-14 RX ORDER — OXYCODONE HYDROCHLORIDE 5 MG/1
5 TABLET ORAL EVERY 4 HOURS PRN
Qty: 18 TABLET | Refills: 0 | Status: SHIPPED | OUTPATIENT
Start: 2022-06-14 | End: 2022-06-14 | Stop reason: SDUPTHER

## 2022-06-14 RX ORDER — GABAPENTIN 300 MG/1
300 CAPSULE ORAL ONCE
Status: COMPLETED | OUTPATIENT
Start: 2022-06-14 | End: 2022-06-14

## 2022-06-14 RX ADMIN — OXYCODONE 5 MG: 5 TABLET ORAL at 02:04

## 2022-06-14 RX ADMIN — MORPHINE SULFATE 4 MG: 4 INJECTION INTRAVENOUS at 00:49

## 2022-06-14 RX ADMIN — GABAPENTIN 300 MG: 300 CAPSULE ORAL at 02:04
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
## 2022-06-14 NOTE — TELEPHONE ENCOUNTER
Please let patient know that I have sent over a prescription for 5 mg tabs to take 1-1/2 tablets up to 5 times a day as needed for severe pain.

## 2022-06-14 NOTE — DISCHARGE INSTRUCTIONS
Please discuss the following findings with your regular doctor:  DX-CHEST-2 VIEWS   Final Result      1.  Persistent left pleural thickening subjacent to the left-sided rib fractures, likely representing hemothorax. No pneumothorax seen.   2.  No significant change from prior.        Labs Reviewed   CBC WITH DIFFERENTIAL - Abnormal; Notable for the following components:       Result Value    RBC 3.67 (*)     Hemoglobin 11.8 (*)     Hematocrit 34.9 (*)     RDW 50.9 (*)     Platelet Count 517 (*)     Monocytes 15.00 (*)     Monos (Absolute) 1.02 (*)     All other components within normal limits   COMP METABOLIC PANEL   LIPASE

## 2022-06-14 NOTE — TELEPHONE ENCOUNTER
Caller Name: Joseph Nathan Schwab   Call Back Number: [phone]    How would the patient prefer to be contacted with a response: Phone call OK to leave a detailed message

## 2022-06-16 ENCOUNTER — TELEPHONE (OUTPATIENT)
Dept: MEDICAL GROUP | Facility: PHYSICIAN GROUP | Age: 37
End: 2022-06-16
Payer: OTHER MISCELLANEOUS

## 2022-06-16 NOTE — TELEPHONE ENCOUNTER
Called and spoke to patient's wife and informed of new pain medication was sent to [facility] on 6/15/22. Wife on her way to  oxyCODONE immediate-release (ROXICODONE) 5 MG Tab [[id number]]     Order Details  Dose: 5-7.5 mg Route: Oral Frequency: EVERY 4 HOURS PRN for Severe Pain   Dispense Quantity: 105 Tablet Refills: 0    Note to Pharmacy: Disregard previous order for 7.5 mg tabs         Sig: Take 1-1.5 Tablets by mouth every four hours as needed for Severe Pain for up to 14 days.     No additional questions.

## 2022-06-27 ENCOUNTER — DOCUMENTATION (OUTPATIENT)
Dept: SLEEP MEDICINE | Facility: MEDICAL CENTER | Age: 37
End: 2022-06-27
Payer: OTHER MISCELLANEOUS

## 2022-06-27 NOTE — PROGRESS NOTES
[facility]      Called the patient today, to follow up on his anticoagulation with Xarelto and to inquire if he was able to successfully transition over to Xarelto 20mg QD.    Patient reports that he is actually not due to transition to Xarelto 20mg QD until the 29th, but reported that he was not able to  his rx from pharmacy due to cost.   Will have the patient swing by the clinic tomorrow since he will be in [place] for another appt and  some samples to tide him over until we are able to figure out his rx issues.     Patient reports tolerating the medication well and no issues with any signs/sx of bleeding.     Patient has anticoagulation follow up again on 7/5/22 via telemed in [place].       Barb KenneyD

## 2022-06-28 ENCOUNTER — DOCUMENTATION (OUTPATIENT)
Dept: VASCULAR LAB | Facility: MEDICAL CENTER | Age: 37
End: 2022-06-28

## 2022-06-28 ENCOUNTER — OFFICE VISIT (OUTPATIENT)
Dept: PHYSICAL MEDICINE AND REHAB | Facility: REHABILITATION | Age: 37
End: 2022-06-28
Payer: COMMERCIAL

## 2022-06-28 VITALS
OXYGEN SATURATION: 97 % | TEMPERATURE: 98.6 F | HEART RATE: 90 BPM | WEIGHT: 165 LBS | DIASTOLIC BLOOD PRESSURE: 62 MMHG | RESPIRATION RATE: 15 BRPM | BODY MASS INDEX: 21.18 KG/M2 | SYSTOLIC BLOOD PRESSURE: 100 MMHG

## 2022-06-28 DIAGNOSIS — S82.892A ANKLE FRACTURE, LEFT, CLOSED, INITIAL ENCOUNTER: ICD-10-CM

## 2022-06-28 DIAGNOSIS — Z86.718 HISTORY OF DVT (DEEP VEIN THROMBOSIS): ICD-10-CM

## 2022-06-28 DIAGNOSIS — R39.12 WEAK URINARY STREAM: ICD-10-CM

## 2022-06-28 DIAGNOSIS — N52.9 ERECTILE DYSFUNCTION, UNSPECIFIED ERECTILE DYSFUNCTION TYPE: ICD-10-CM

## 2022-06-28 DIAGNOSIS — I10 HYPERTENSION, UNSPECIFIED TYPE: ICD-10-CM

## 2022-06-28 DIAGNOSIS — T14.90XA TRAUMA: Primary | ICD-10-CM

## 2022-06-28 DIAGNOSIS — S22.43XA CLOSED FRACTURE OF MULTIPLE RIBS OF BOTH SIDES, INITIAL ENCOUNTER: ICD-10-CM

## 2022-06-28 DIAGNOSIS — S22.42XA CLOSED FRACTURE OF MULTIPLE RIBS OF LEFT SIDE, INITIAL ENCOUNTER: ICD-10-CM

## 2022-06-28 DIAGNOSIS — S32.009A CLOSED FRACTURE OF TRANSVERSE PROCESS OF LUMBAR VERTEBRA, INITIAL ENCOUNTER (HCC): ICD-10-CM

## 2022-06-28 PROCEDURE — G2212 PROLONG OUTPT/OFFICE VIS: HCPCS | Performed by: PHYSICAL MEDICINE & REHABILITATION

## 2022-06-28 PROCEDURE — 99215 OFFICE O/P EST HI 40 MIN: CPT | Performed by: PHYSICAL MEDICINE & REHABILITATION

## 2022-06-28 RX ORDER — AMOXICILLIN 500 MG/1
CAPSULE ORAL
COMMUNITY
Start: 2022-06-24 | End: 2022-08-02

## 2022-06-28 RX ORDER — GABAPENTIN 300 MG/1
600 CAPSULE ORAL EVERY 8 HOURS
Qty: 180 CAPSULE | Refills: 1 | Status: SHIPPED | OUTPATIENT
Start: 2022-06-28 | End: 2022-10-12

## 2022-06-28 RX ORDER — LISINOPRIL 5 MG/1
5 TABLET ORAL DAILY
Qty: 30 TABLET | Refills: 1 | Status: SHIPPED | OUTPATIENT
Start: 2022-06-28 | End: 2022-07-21

## 2022-06-28 ASSESSMENT — FIBROSIS 4 INDEX: FIB4 SCORE: 0.34
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
## 2022-06-28 NOTE — PROGRESS NOTES
Samples of Xarelto 20mg given to pt today     Lot [id number]  Exp 11/24      Current Outpatient Medications:   •  oxyCODONE immediate-release, 5-7.5 mg, Oral, Q4HRS PRN  •  gabapentin, 300 mg, Oral, Q8HRS  •  metaxalone, 800 mg, Oral, 4X/DAY  •  Misc. Devices, Please dispense one raised toilet seat.  •  rivaroxaban, 20 mg, Oral, PM MEAL (Patient not taking: Reported on 6/10/2022)  •  acetaminophen, 650 mg, Oral, Q6HRS PRN  •  lisinopril, 10 mg, Oral, DAILY  •  Rivaroxaban, 15 mg, Oral, BID WITH MEALS  •  senna-docusate, 1 Tablet, Oral, Nightly (Patient not taking: No sig reported)  •  venlafaxine XR, 75 mg, Oral, DAILY

## 2022-06-30 ENCOUNTER — OFFICE VISIT (OUTPATIENT)
Dept: MEDICAL GROUP | Facility: PHYSICIAN GROUP | Age: 37
End: 2022-06-30
Payer: OTHER MISCELLANEOUS

## 2022-06-30 VITALS
DIASTOLIC BLOOD PRESSURE: 62 MMHG | HEART RATE: 80 BPM | BODY MASS INDEX: 21.21 KG/M2 | SYSTOLIC BLOOD PRESSURE: 98 MMHG | WEIGHT: 165.2 LBS | OXYGEN SATURATION: 91 % | TEMPERATURE: 99 F | RESPIRATION RATE: 20 BRPM

## 2022-06-30 DIAGNOSIS — S32.592A CLOSED BILATERAL FRACTURE OF PUBIC RAMI, INITIAL ENCOUNTER (HCC): ICD-10-CM

## 2022-06-30 DIAGNOSIS — I10 PRIMARY HYPERTENSION: ICD-10-CM

## 2022-06-30 DIAGNOSIS — M54.2 NECK PAIN: ICD-10-CM

## 2022-06-30 DIAGNOSIS — T14.90XA TRAUMA: ICD-10-CM

## 2022-06-30 DIAGNOSIS — S32.591A CLOSED BILATERAL FRACTURE OF PUBIC RAMI, INITIAL ENCOUNTER (HCC): ICD-10-CM

## 2022-06-30 DIAGNOSIS — S22.43XA CLOSED FRACTURE OF MULTIPLE RIBS OF BOTH SIDES, INITIAL ENCOUNTER: ICD-10-CM

## 2022-06-30 DIAGNOSIS — R91.1 LUNG NODULE: ICD-10-CM

## 2022-06-30 DIAGNOSIS — M25.512 ACUTE PAIN OF LEFT SHOULDER: ICD-10-CM

## 2022-06-30 DIAGNOSIS — S32.009A CLOSED FRACTURE OF TRANSVERSE PROCESS OF LUMBAR VERTEBRA, INITIAL ENCOUNTER (HCC): ICD-10-CM

## 2022-06-30 PROCEDURE — 99214 OFFICE O/P EST MOD 30 MIN: CPT | Performed by: NURSE PRACTITIONER

## 2022-06-30 RX ORDER — OXYCODONE HYDROCHLORIDE 5 MG/1
5-7.5 TABLET ORAL EVERY 4 HOURS PRN
Qty: 95 TABLET | Refills: 0 | Status: SHIPPED | OUTPATIENT
Start: 2022-06-30 | End: 2022-07-21 | Stop reason: SDUPTHER

## 2022-06-30 ASSESSMENT — FIBROSIS 4 INDEX: FIB4 SCORE: 0.34

## 2022-06-30 NOTE — PROGRESS NOTES
CC: Follow-up    HISTORY OF THE PRESENT ILLNESS: Patient is a 36 y.o. male. This pleasant patient is here today, accompanied by girlfriend and  baby, for evaluation and management of the following health problems.        Primary hypertension  Hypotensive.  Please see additional notes.    Trauma  Continues with low back, left shoulder, neck, rib pain.  Taking oxycodone 5 mg half to whole tab 3-5 times a day.  Has 22 tablets of oxycodone left.  Started to take it with Tylenol.  Denies constipation.  Not needing to take any supplements for constipation.    For left shoulder, has been trying ice and heat.  Oxycodone does not seem to help left shoulder.  Home health physical therapy did evaluation today and gave patient some exercises to do for neck.  Denies numbness and tingling in left upper extremity.    Consulted with physiatry yesterday, Dr. Sarah Hernandez..  Gabapentin was increased.  Lisinopril was decreased to 5 mg as patient's blood pressure is low.  Patient reports he is still taking the 10 mg dose.  Trying to finish off the bottle.  Advised patient to discontinue 10 mg dose and start 5 mg dose.  Patient is agreeable.    Home health nurse has made a couple of visits.    Patient has been having difficulty getting a hold of Dr. Han, orthopedics for follow-up.  Dr. Hernandez referred him again yesterday.  Patient will continue to try to contact Dr. Han office.    Continues on Xarelto, followed by anticoagulation clinic.      Lung nodule  Lung nodule incidentally found on CT of thorax during hospitalization.  Recommendation is to repeat in 3 months.  Reviewed with patient.      Allergies: Patient has no known allergies.    Current Outpatient Medications Ordered in Epic   Medication Sig Dispense Refill   • oxyCODONE immediate-release (ROXICODONE) 5 MG Tab Take 1-1.5 Tablets by mouth every four hours as needed for Severe Pain for up to 14 days. 95 Tablet 0   • amoxicillin (AMOXIL) 500 MG Cap      •  gabapentin (NEURONTIN) 300 MG Cap Take 2 Capsules by mouth every 8 hours. (Patient taking differently: Take 600 mg by mouth every 8 hours. Pt currently doing 2 ca[facility]les at [facility] 1x in morning 1 in afternoon) 180 Capsule 1   • lisinopril (PRINIVIL) 5 MG Tab Take 1 Tablet by mouth every day. (Patient taking differently: Take 10 mg by mouth every day. Pt st another week on the 10 mg then switching to 5mg) 30 Tablet 1   • metaxalone (SKELAXIN) 800 MG Tab Take 1 Tablet by mouth 4 times a day. 120 Tablet 1   • Misc. Devices Misc Please dispense one raised toilet seat. 1 Each 0   • rivaroxaban (XARELTO) 20 MG Tab tablet Take 1 Tablet by mouth with dinner. 30 Tablet 2   • acetaminophen (TYLENOL) 325 MG Tab Take 500 mg by mouth every 6 hours as needed for Mild Pain. 30 Tablet 0   • venlafaxine XR (EFFEXOR XR) 75 MG CAPSULE SR 24 HR Take 1 Capsule by mouth every day. 90 Capsule 3   • Rivaroxaban 15 & 20 MG Tablet Therapy Pack Take 15 mg by mouth 2 times a day with meals for 21 days, then 20 mg daily with dinner thereafter (Patient not taking: Reported on 6/30/2022) 51 Each 0   • senna-docusate (PERICOLACE OR SENOKOT S) 8.6-50 MG Tab Take 1 Tablet by mouth every evening. Take while on narcotic (Patient not taking: No sig reported) 30 Tablet 0     No current Epic-ordered facility-administered medications on file.       Past Medical History:   Diagnosis Date   • Back pain        Past Surgical History:   Procedure Laterality Date   • ORIF, PELVIS  5/27/2022    Procedure: ORIF, PELVIS;  Surgeon: Manolo Han M.D.;  Location: SURGERY [facility];  Service: Orthopedics       Social History     Tobacco Use   • Smoking status: Never Smoker   • Smokeless tobacco: Never Used   Vaping Use   • Vaping Use: Never used   Substance Use Topics   • Alcohol use: Not Currently     Comment: occasional   • Drug use: Yes     Comment: Marijuana, occasional       No family history on file.    ROS: As in HPI, otherwise negative for chest pain,  dyspnea, abdominal pain, dysuria, blood in stool, fever      Exam: BP (!) 98/62 (BP Location: Left arm, Patient Position: Sitting, BP Cuff Size: Adult)   Pulse 80   Temp 37.2 °C (99 °F) (Temporal)   Resp 20   Wt 74.9 kg (165 lb 3.2 oz)   SpO2 91%  Body mass index is 21.21 kg/m².    General: Alert, pleasant, well nourished, well developed male in NAD  Neck: Supple without bruit. Thyroid is not enlarged.  Pulmonary: Clear to ausculation.  Normal effort. No rales, ronchi, or wheezing.  Cardiovascular: Normal rate and rhythm without murmur.   Neurologic: Grossly nonfocal  Skin: Warm and dry.    Musculoskeletal: Left shoulder, full active range of motion, nontender.  Psych: Normal mood and affect. Alert and oriented. Judgment and insight is normal.    Please note that this dictation was created using voice recognition software. I have made every reasonable attempt to correct obvious errors, but I expect that there are errors of grammar and possibly content that I did not discover before finalizing the note.      Assessment/Plan  1. Closed bilateral fracture of pubic rami, initial encounter ([facility])    - oxyCODONE immediate-release (ROXICODONE) 5 MG Tab; Take 1-1.5 Tablets by mouth every four hours as needed for Severe Pain for up to 14 days.  Dispense: 95 Tablet; Refill: 0    2. Closed fracture of multiple ribs of both sides, initial encounter    - oxyCODONE immediate-release (ROXICODONE) 5 MG Tab; Take 1-1.5 Tablets by mouth every four hours as needed for Severe Pain for up to 14 days.  Dispense: 95 Tablet; Refill: 0    3. Closed fracture of transverse process of lumbar vertebra, initial encounter ([facility])    - oxyCODONE immediate-release (ROXICODONE) 5 MG Tab; Take 1-1.5 Tablets by mouth every four hours as needed for Severe Pain for up to 14 days.  Dispense: 95 Tablet; Refill: 0    4. Neck pain    - DX-CERVICAL SPINE-2 OR 3 VIEWS; Future    5. Acute pain of left shoulder    - DX-SHOULDER 2+ LEFT; Future    6. Lung  nodule    - CT-CHEST (THORAX) W/O; Future    7. Primary hypertension      8. Trauma      Clinical decision making:  Overall, appears more comfortable than last appointment.  Able to get up to exam bed during this appointment.  Continues with neck and left shoulder pain.  Will get imaging.  Advised on ice, heat, Tylenol.  Continue with PT.  Pain in all areas is improving.  Will continue with oxycodone at half tab to a whole tab at a time.  Patient will decrease as tolerated.   reviewed.  Again reviewed risks of opioids including addiction, respiratory depression.  Patient verbalizes understanding.  Hopefully, pain will continue to improve and not need any further pain medication after this prescription.  Continue follow-up with physiatry.  We will again reach out to his orthopedist.  Continue follow-up with anticoagulation clinic.  Instructed to decrease lisinopril to 5 mg daily.  Patient verbalizes understanding.  Will repeat CT for lung nodule in September.    Patient will return to clinic in 3 weeks for follow-up on pain management or sooner if needed.

## 2022-07-01 NOTE — ASSESSMENT & PLAN NOTE
Lung nodule incidentally found on CT of thorax during hospitalization.  Recommendation is to repeat in 3 months.  Reviewed with patient.

## 2022-07-01 NOTE — ASSESSMENT & PLAN NOTE
Continues with low back, left shoulder, neck, rib pain.  Taking oxycodone 5 mg half to whole tab 3-5 times a day.  Has 22 tablets of oxycodone left.  Started to take it with Tylenol.  Denies constipation.  Not needing to take any supplements for constipation.    For left shoulder, has been trying ice and heat.  Oxycodone does not seem to help left shoulder.  Home health physical therapy did evaluation today and gave patient some exercises to do for neck.  Denies numbness and tingling in left upper extremity.    Consulted with physiatry yesterday, Dr. Sarah Hernandez..  Gabapentin was increased.  Lisinopril was decreased to 5 mg as patient's blood pressure is low.  Patient reports he is still taking the 10 mg dose.  Trying to finish off the bottle.  Advised patient to discontinue 10 mg dose and start 5 mg dose.  Patient is agreeable.    Home health nurse has made a couple of visits.    Patient has been having difficulty getting a hold of Dr. Han, orthopedics for follow-up.  Dr. Hernandez referred him again yesterday.  Patient will continue to try to contact Dr. Han office.    Continues on Xarelto, followed by anticoagulation clinic.

## 2022-07-12 ENCOUNTER — ANTICOAGULATION MONITORING (OUTPATIENT)
Dept: VASCULAR LAB | Facility: MEDICAL CENTER | Age: 37
End: 2022-07-12

## 2022-07-12 ENCOUNTER — NON-PROVIDER VISIT (OUTPATIENT)
Dept: MEDICAL GROUP | Facility: PHYSICIAN GROUP | Age: 37
End: 2022-07-12
Payer: MEDICAID

## 2022-07-12 VITALS
DIASTOLIC BLOOD PRESSURE: 74 MMHG | TEMPERATURE: 97.4 F | RESPIRATION RATE: 12 BRPM | BODY MASS INDEX: 21.34 KG/M2 | WEIGHT: 166.3 LBS | OXYGEN SATURATION: 95 % | HEIGHT: 74 IN | HEART RATE: 98 BPM | SYSTOLIC BLOOD PRESSURE: 118 MMHG

## 2022-07-12 DIAGNOSIS — I82.432 ACUTE DEEP VEIN THROMBOSIS (DVT) OF POPLITEAL VEIN OF LEFT LOWER EXTREMITY (HCC): ICD-10-CM

## 2022-07-12 DIAGNOSIS — Z95.828 S/P IVC FILTER: ICD-10-CM

## 2022-07-12 PROCEDURE — 99214 OFFICE O/P EST MOD 30 MIN: CPT | Performed by: NURSE PRACTITIONER

## 2022-07-12 ASSESSMENT — FIBROSIS 4 INDEX: FIB4 SCORE: 0.34

## 2022-07-12 ASSESSMENT — ENCOUNTER SYMPTOMS
SHORTNESS OF BREATH: 0
TINGLING: 1
BRUISES/BLEEDS EASILY: 0
BLOOD IN STOOL: 0
COUGH: 0
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
## 2022-07-14 ENCOUNTER — HOSPITAL ENCOUNTER (OUTPATIENT)
Dept: LAB | Facility: MEDICAL CENTER | Age: 37
End: 2022-07-14
Attending: NURSE PRACTITIONER
Payer: COMMERCIAL

## 2022-07-14 DIAGNOSIS — D64.9 ANEMIA, UNSPECIFIED TYPE: ICD-10-CM

## 2022-07-14 DIAGNOSIS — R74.01 TRANSAMINITIS: ICD-10-CM

## 2022-07-14 LAB
ALBUMIN SERPL BCP-MCNC: 4.3 G/DL (ref 3.2–4.9)
ALBUMIN/GLOB SERPL: 1.5 G/DL
ALP SERPL-CCNC: 264 U/L (ref 30–99)
ALT SERPL-CCNC: 42 U/L (ref 2–50)
ANION GAP SERPL CALC-SCNC: 10 MMOL/L (ref 7–16)
AST SERPL-CCNC: 30 U/L (ref 12–45)
BASOPHILS # BLD AUTO: 0.6 % (ref 0–1.8)
BASOPHILS # BLD: 0.05 K/UL (ref 0–0.12)
BILIRUB SERPL-MCNC: 0.2 MG/DL (ref 0.1–1.5)
BUN SERPL-MCNC: 8 MG/DL (ref 8–22)
CALCIUM SERPL-MCNC: 9.5 MG/DL (ref 8.5–10.5)
CHLORIDE SERPL-SCNC: 103 MMOL/L (ref 96–112)
CO2 SERPL-SCNC: 25 MMOL/L (ref 20–33)
CREAT SERPL-MCNC: 0.74 MG/DL (ref 0.5–1.4)
EOSINOPHIL # BLD AUTO: 0.76 K/UL (ref 0–0.51)
EOSINOPHIL NFR BLD: 8.6 % (ref 0–6.9)
ERYTHROCYTE [DISTWIDTH] IN BLOOD BY AUTOMATED COUNT: 41.2 FL (ref 35.9–50)
FASTING STATUS PATIENT QL REPORTED: NORMAL
GFR SERPLBLD CREATININE-BSD FMLA CKD-EPI: 121 ML/MIN/1.73 M 2
GLOBULIN SER CALC-MCNC: 2.9 G/DL (ref 1.9–3.5)
GLUCOSE SERPL-MCNC: 88 MG/DL (ref 65–99)
HCT VFR BLD AUTO: 40.3 % (ref 42–52)
HGB BLD-MCNC: 13.6 G/DL (ref 14–18)
IMM GRANULOCYTES # BLD AUTO: 0.05 K/UL (ref 0–0.11)
IMM GRANULOCYTES NFR BLD AUTO: 0.6 % (ref 0–0.9)
LYMPHOCYTES # BLD AUTO: 2.18 K/UL (ref 1–4.8)
LYMPHOCYTES NFR BLD: 24.7 % (ref 22–41)
MCH RBC QN AUTO: 30.8 PG (ref 27–33)
MCHC RBC AUTO-ENTMCNC: 33.7 G/DL (ref 33.7–35.3)
MCV RBC AUTO: 91.2 FL (ref 81.4–97.8)
MONOCYTES # BLD AUTO: 0.71 K/UL (ref 0–0.85)
MONOCYTES NFR BLD AUTO: 8 % (ref 0–13.4)
NEUTROPHILS # BLD AUTO: 5.08 K/UL (ref 1.82–7.42)
NEUTROPHILS NFR BLD: 57.5 % (ref 44–72)
NRBC # BLD AUTO: 0 K/UL
NRBC BLD-RTO: 0 /100 WBC
PLATELET # BLD AUTO: 352 K/UL (ref 164–446)
PMV BLD AUTO: 9.4 FL (ref 9–12.9)
POTASSIUM SERPL-SCNC: 3.9 MMOL/L (ref 3.6–5.5)
PROT SERPL-MCNC: 7.2 G/DL (ref 6–8.2)
RBC # BLD AUTO: 4.42 M/UL (ref 4.7–6.1)
SODIUM SERPL-SCNC: 138 MMOL/L (ref 135–145)
WBC # BLD AUTO: 8.8 K/UL (ref 4.8–10.8)

## 2022-07-14 PROCEDURE — 80053 COMPREHEN METABOLIC PANEL: CPT

## 2022-07-14 PROCEDURE — 85025 COMPLETE CBC W/AUTO DIFF WBC: CPT

## 2022-07-14 PROCEDURE — 36415 COLL VENOUS BLD VENIPUNCTURE: CPT

## 2022-07-16 ENCOUNTER — HOSPITAL ENCOUNTER (OUTPATIENT)
Dept: RADIOLOGY | Facility: MEDICAL CENTER | Age: 37
End: 2022-07-16
Attending: PHYSICAL MEDICINE & REHABILITATION
Payer: COMMERCIAL

## 2022-07-16 ENCOUNTER — HOSPITAL ENCOUNTER (OUTPATIENT)
Dept: RADIOLOGY | Facility: MEDICAL CENTER | Age: 37
End: 2022-07-16
Attending: NURSE PRACTITIONER
Payer: COMMERCIAL

## 2022-07-16 DIAGNOSIS — N52.9 ERECTILE DYSFUNCTION, UNSPECIFIED ERECTILE DYSFUNCTION TYPE: ICD-10-CM

## 2022-07-16 DIAGNOSIS — M54.2 NECK PAIN: ICD-10-CM

## 2022-07-16 DIAGNOSIS — T14.90XA TRAUMA: ICD-10-CM

## 2022-07-16 DIAGNOSIS — R91.1 LUNG NODULE: ICD-10-CM

## 2022-07-16 DIAGNOSIS — R39.12 WEAK URINARY STREAM: ICD-10-CM

## 2022-07-16 DIAGNOSIS — M25.512 ACUTE PAIN OF LEFT SHOULDER: ICD-10-CM

## 2022-07-16 PROCEDURE — 71250 CT THORAX DX C-: CPT

## 2022-07-16 PROCEDURE — 72148 MRI LUMBAR SPINE W/O DYE: CPT

## 2022-07-16 PROCEDURE — 72040 X-RAY EXAM NECK SPINE 2-3 VW: CPT

## 2022-07-16 PROCEDURE — 73030 X-RAY EXAM OF SHOULDER: CPT | Mod: LT

## 2022-07-16 PROCEDURE — 72195 MRI PELVIS W/O DYE: CPT

## 2022-07-21 ENCOUNTER — OFFICE VISIT (OUTPATIENT)
Dept: MEDICAL GROUP | Facility: PHYSICIAN GROUP | Age: 37
End: 2022-07-21
Payer: OTHER MISCELLANEOUS

## 2022-07-21 VITALS
WEIGHT: 164 LBS | BODY MASS INDEX: 21.05 KG/M2 | RESPIRATION RATE: 16 BRPM | SYSTOLIC BLOOD PRESSURE: 118 MMHG | HEIGHT: 74 IN | HEART RATE: 86 BPM | OXYGEN SATURATION: 97 % | DIASTOLIC BLOOD PRESSURE: 70 MMHG | TEMPERATURE: 97.9 F

## 2022-07-21 DIAGNOSIS — R91.1 LUNG NODULE: ICD-10-CM

## 2022-07-21 DIAGNOSIS — I10 PRIMARY HYPERTENSION: ICD-10-CM

## 2022-07-21 DIAGNOSIS — S22.43XA CLOSED FRACTURE OF MULTIPLE RIBS OF BOTH SIDES, INITIAL ENCOUNTER: ICD-10-CM

## 2022-07-21 DIAGNOSIS — R68.84 JAW PAIN: ICD-10-CM

## 2022-07-21 DIAGNOSIS — T14.90XA TRAUMA: ICD-10-CM

## 2022-07-21 DIAGNOSIS — S22.42XA CLOSED FRACTURE OF MULTIPLE RIBS OF LEFT SIDE, INITIAL ENCOUNTER: ICD-10-CM

## 2022-07-21 DIAGNOSIS — S32.009A CLOSED FRACTURE OF TRANSVERSE PROCESS OF LUMBAR VERTEBRA, INITIAL ENCOUNTER (HCC): ICD-10-CM

## 2022-07-21 DIAGNOSIS — S32.592A CLOSED BILATERAL FRACTURE OF PUBIC RAMI, INITIAL ENCOUNTER (HCC): ICD-10-CM

## 2022-07-21 DIAGNOSIS — S32.591A CLOSED BILATERAL FRACTURE OF PUBIC RAMI, INITIAL ENCOUNTER (HCC): ICD-10-CM

## 2022-07-21 DIAGNOSIS — M25.512 ACUTE PAIN OF LEFT SHOULDER: ICD-10-CM

## 2022-07-21 DIAGNOSIS — S82.892A ANKLE FRACTURE, LEFT, CLOSED, INITIAL ENCOUNTER: ICD-10-CM

## 2022-07-21 DIAGNOSIS — M54.2 NECK PAIN: ICD-10-CM

## 2022-07-21 PROBLEM — S36.113A LIVER LACERATION, INITIAL ENCOUNTER: Status: RESOLVED | Noted: 2022-05-26 | Resolved: 2022-07-21

## 2022-07-21 PROBLEM — D72.829 LEUKOCYTOSIS: Status: RESOLVED | Noted: 2022-05-26 | Resolved: 2022-07-21

## 2022-07-21 PROCEDURE — 99213 OFFICE O/P EST LOW 20 MIN: CPT | Performed by: NURSE PRACTITIONER

## 2022-07-21 RX ORDER — NALOXONE HYDROCHLORIDE 4 MG/.1ML
SPRAY NASAL
Status: ON HOLD | COMMUNITY
Start: 2022-06-16 | End: 2022-11-22

## 2022-07-21 RX ORDER — METAXALONE 800 MG/1
800 TABLET ORAL 4 TIMES DAILY
Qty: 120 TABLET | Refills: 1 | Status: SHIPPED | OUTPATIENT
Start: 2022-07-21 | End: 2022-10-12

## 2022-07-21 RX ORDER — OXYCODONE HYDROCHLORIDE 5 MG/1
2.5-5 TABLET ORAL EVERY 8 HOURS PRN
Qty: 30 TABLET | Refills: 0 | Status: SHIPPED | OUTPATIENT
Start: 2022-07-21 | End: 2022-08-04

## 2022-07-21 ASSESSMENT — FIBROSIS 4 INDEX: FIB4 SCORE: 0.46

## 2022-07-21 NOTE — ASSESSMENT & PLAN NOTE
Repeat CT shows resolution of lung nodule.  Did find some new benign appearing nodules.  Repeat CT only necessary if high risk.  Patient does not have a history of smoking.

## 2022-07-21 NOTE — ASSESSMENT & PLAN NOTE
Chronic health problem.  Lisinopril was decreased to 5 mg at last appointment.  Still feeling lightheaded and fell down, denies injury.  Blood pressures at home in the low 100s/60s.  Has not taken lisinopril for a couple days and blood pressure today is 118/70.  Patient would like to discontinue lisinopril.

## 2022-07-21 NOTE — ASSESSMENT & PLAN NOTE
Patient reports mild improvement of midline neck pain.  X-rays show some degenerative changes.  Advised on ice/heat.  He will notify his home health physical therapist, who will help provide patient with exercises.

## 2022-07-21 NOTE — ASSESSMENT & PLAN NOTE
"Finally had post hospitalization follow-up with orthopedist, Dr. Han.  Patient reports he was released from his wheelchair.  He is now ambulating with a crutch.  We will start active physical therapy next week, has home health PT.    Has been able to sleep in his bed as rib pain has improved.  However, still having some low back pain with lying flat.    Has been tapering off of oxycodone.  Now taking a half to whole tab every 4-6 hours.  I explained to patient that he only needs to take it if he is having moderate to severe pain.  No longer has to \"stay on top of the pain.\"  He should now start with Tylenol and/or ibuprofen to help with pain.  Will prescribe 30 more tablets to help him taper off of oxycodone.  Also advised to continue with gabapentin at night.  Patient verbalizes understanding.  "

## 2022-07-21 NOTE — ASSESSMENT & PLAN NOTE
Jaw pain was from wisdom tooth.  Patient had wisdom tooth pulled 2 days ago.  They were not able to prescribe him pain medication as he was receiving pain management from me.  Has been taking oxycodone 2.5 to 5 mg with good relief in addition to Tylenol.

## 2022-07-21 NOTE — PROGRESS NOTES
"CC: Follow-up    HISTORY OF THE PRESENT ILLNESS: Patient is a 36 y.o. male. This pleasant patient is here today  for evaluation and management of the following health problems.        Closed bilateral fracture of pubic rami (HCC)  Finally had post hospitalization follow-up with orthopedist, Dr. Han.  Patient reports he was released from his wheelchair.  He is now ambulating with a crutch.  We will start active physical therapy next week, has home health PT.    Has been able to sleep in his bed as rib pain has improved.  However, still having some low back pain with lying flat.    Has been tapering off of oxycodone.  Now taking a half to whole tab every 4-6 hours.  I explained to patient that he only needs to take it if he is having moderate to severe pain.  No longer has to \"stay on top of the pain.\"  He should now start with Tylenol and/or ibuprofen to help with pain.  Will prescribe 30 more tablets to help him taper off of oxycodone.  Also advised to continue with gabapentin at night.  Patient verbalizes understanding.    Primary hypertension  Chronic health problem.  Lisinopril was decreased to 5 mg at last appointment.  Still feeling lightheaded and fell down, denies injury.  Blood pressures at home in the low 100s/60s.  Has not taken lisinopril for a couple days and blood pressure today is 118/70.  Patient would like to discontinue lisinopril.    Lung nodule  Repeat CT shows resolution of lung nodule.  Did find some new benign appearing nodules.  Repeat CT only necessary if high risk.  Patient does not have a history of smoking.    Jaw pain  Jaw pain was from wisdom tooth.  Patient had wisdom tooth pulled 2 days ago.  They were not able to prescribe him pain medication as he was receiving pain management from me.  Has been taking oxycodone 2.5 to 5 mg with good relief in addition to Tylenol.    Neck pain  Patient reports mild improvement of midline neck pain.  X-rays show some degenerative changes.  Advised " on ice/heat.  He will notify his home health physical therapist, who will help provide patient with exercises.    Acute pain of left shoulder  Patient reports left shoulder pain improving.  X-ray showed no abnormalities.  Continue to monitor, continue with PT.      Allergies: Patient has no known allergies.    Current Outpatient Medications Ordered in Epic   Medication Sig Dispense Refill   • Naloxone (NARCAN) 4 MG/0.1ML Liquid ADMINISTER A SINGLE SPRAY INTRANASALLY INTO ONE NOSTRIL. CALL 911. MAY REPEAT X1.     • amoxicillin (AMOXIL) 500 MG Cap      • gabapentin (NEURONTIN) 300 MG Cap Take 2 Capsules by mouth every 8 hours. (Patient taking differently: Take 600 mg by mouth every 8 hours. Pt currently doing 2 caplusles at [facility] 1x in morning 1 in afternoon) 180 Capsule 1   • metaxalone (SKELAXIN) 800 MG Tab Take 1 Tablet by mouth 4 times a day. 120 Tablet 1   • Misc. Devices Misc Please dispense one raised toilet seat. 1 Each 0   • rivaroxaban (XARELTO) 20 MG Tab tablet Take 1 Tablet by mouth with dinner. 30 Tablet 2   • acetaminophen (TYLENOL) 325 MG Tab Take 500 mg by mouth every 6 hours as needed for Mild Pain. 30 Tablet 0   • venlafaxine XR (EFFEXOR XR) 75 MG CAPSULE SR 24 HR Take 1 Capsule by mouth every day. 90 Capsule 3     No current [facility]-ordered facility-administered medications on file.       Past Medical History:   Diagnosis Date   • Acute deep vein thrombosis (DVT) of popliteal vein of left lower extremity (HCC) 5/26/2022   • Back pain        Past Surgical History:   Procedure Laterality Date   • ORIF, PELVIS  5/27/2022    Procedure: ORIF, PELVIS;  Surgeon: Manolo Han M.D.;  Location: SURGERY [facility];  Service: Orthopedics       Social History     Tobacco Use   • Smoking status: Never Smoker   • Smokeless tobacco: Never Used   Vaping Use   • Vaping Use: Never used   Substance Use Topics   • Alcohol use: Not Currently     Comment: occasional   • Drug use: Yes     Types: Marijuana, Inhaled      "Comment: Marijuana, occasional       No family history on file.    ROS:  As in HPI, otherwise negative for chest pain, dyspnea, abdominal pain, dysuria, blood in stool, fever           Exam: /70   Pulse 86   Temp 36.6 °C (97.9 °F) (Temporal)   Resp 16   Ht 1.88 m (6' 2\")   Wt 74.4 kg (164 lb)   SpO2 97%  Body mass index is 21.06 kg/m².    General: Alert, pleasant, well nourished, well developed male in NAD  HEENT: Normocephalic. Eyes conjunctiva clear lids without ptosis, oropharynx is without erythema, edema or exudates.  Sutures at extraction site visible and intact  Neck: Supple   Pulmonary: Clear to ausculation.  Normal effort. No rales, ronchi, or wheezing.  Cardiovascular: Normal rate and rhythm without murmur. Carotid and radial pulses are intact and equal bilaterally.  No lower extremity edema.  Neurologic: Grossly nonfocal  Skin: Warm and dry.    Musculoskeletal: crutches  Psych: Normal mood and affect. Alert and oriented. Judgment and insight is normal.    Component      Latest Ref Rng & Units 7/14/2022   WBC      4.8 - 10.8 K/uL 8.8   RBC      4.70 - 6.10 M/uL 4.42 (L)   Hemoglobin      14.0 - 18.0 g/dL 13.6 (L)   Hematocrit      42.0 - 52.0 % 40.3 (L)   MCV      81.4 - 97.8 fL 91.2   MCH      27.0 - 33.0 pg 30.8   MCHC      33.7 - 35.3 g/dL 33.7   RDW      35.9 - 50.0 fL 41.2   Platelet Count      164 - 446 K/uL 352   MPV      9.0 - 12.9 fL 9.4   Neutrophils-Polys      44.00 - 72.00 % 57.50   Lymphocytes      22.00 - 41.00 % 24.70   Monocytes      0.00 - 13.40 % 8.00   Eosinophils      0.00 - 6.90 % 8.60 (H)   Basophils      0.00 - 1.80 % 0.60   Immature Granulocytes      0.00 - 0.90 % 0.60   Nucleated RBC      /100 WBC 0.00   Neutrophils (Absolute)      1.82 - 7.42 K/uL 5.08   Lymphs (Absolute)      1.00 - 4.80 K/uL 2.18   Monos (Absolute)      0.00 - 0.85 K/uL 0.71   Eos (Absolute)      0.00 - 0.51 K/uL 0.76 (H)   Baso (Absolute)      0.00 - 0.12 K/uL 0.05   Immature Granulocytes (abs)      " 0.00 - 0.11 K/uL 0.05   NRBC (Absolute)      K/uL 0.00   Sodium      135 - 145 mmol/L 138   Potassium      3.6 - 5.5 mmol/L 3.9   Chloride      96 - 112 mmol/L 103   Co2      20 - 33 mmol/L 25   Anion Gap      7.0 - 16.0 10.0   Glucose      65 - 99 mg/dL 88   Bun      8 - 22 mg/dL 8   Creatinine      0.50 - 1.40 mg/dL 0.74   Calcium      8.5 - 10.5 mg/dL 9.5   AST(SGOT)      12 - 45 U/L 30   ALT(SGPT)      2 - 50 U/L 42   Alkaline Phosphatase      30 - 99 U/L 264 (H)   Total Bilirubin      0.1 - 1.5 mg/dL 0.2   Albumin      3.2 - 4.9 g/dL 4.3   Total Protein      6.0 - 8.2 g/dL 7.2   Globulin      1.9 - 3.5 g/dL 2.9   A-G Ratio      g/dL 1.5   GFR (CKD-EPI)      >60 mL/min/1.73 m 2 121   Fasting Status       Fasting       Please note that this dictation was created using voice recognition software. I have made every reasonable attempt to correct obvious errors, but I expect that there are errors of grammar and possibly content that I did not discover before finalizing the note.      Assessment/Plan  1. Closed fracture of multiple ribs of left side, initial encounter      2. Trauma      3. Closed fracture of transverse process of lumbar vertebra, initial encounter ([facility])      4. Closed fracture of multiple ribs of both sides, initial encounter      5. Ankle fracture, left, closed, initial encounter      6. Closed bilateral fracture of pubic rami, initial encounter ([facility])      7. Primary hypertension      8. Lung nodule      9. Jaw pain      10. Neck pain      11. Acute pain of left shoulder      Clinical decision making:  Patient will discontinue lisinopril as blood pressures have been under good control without medication and was having lightheadedness and subsequent fall with lisinopril.  He will continue follow-up with orthopedics, Dr. Gil, and physiatry, Dr. Hernandez.  For pain control, patient will use Tylenol and/or ibuprofen for mild to moderate pain.  Will take oxycodone only if pain is severe and not relieved  with Tylenol or ibuprofen.  I will prescribe him 30 more tabs to take sparingly.   reviewed.  Patient understands this is the last prescription for pain medication.  Pain has been subsiding.  No need for repeat lung CT as patient is not high risk.  Continue follow-up with oral surgeon if any problems with tooth extraction.  Continue with home health physical therapy and other care.  Review of lab results show that anemia is resolving and alkaline phosphatase is trending down.      Patient will follow-up in 3 months or sooner if needed.

## 2022-07-21 NOTE — PATIENT INSTRUCTIONS
For pain control:  Start with Tylenol and/or ibuprofen.  Only take oxycodone if pain is moderate to severe and not relieved by Tylenol or ibuprofen.

## 2022-07-21 NOTE — ASSESSMENT & PLAN NOTE
Patient reports left shoulder pain improving.  X-ray showed no abnormalities.  Continue to monitor, continue with PT.

## 2022-07-28 ENCOUNTER — TELEPHONE (OUTPATIENT)
Dept: MEDICAL GROUP | Facility: PHYSICIAN GROUP | Age: 37
End: 2022-07-28
Payer: MEDICAID

## 2022-07-28 NOTE — TELEPHONE ENCOUNTER
Cathleen from [facility] called to inform you that patient's BP was:    Rt arm 122/90 and lft arm 118/87    He did state you told him to d/c his lisinopril.     Please advise.      Cathleen cell#: [phone]

## 2022-07-29 NOTE — TELEPHONE ENCOUNTER
Continue to monitor blood pressure.  If on 3 separate visits, blood pressure is greater than 130/80, should resume lisinopril 5 mg daily.

## 2022-08-02 ENCOUNTER — ANTICOAGULATION VISIT (OUTPATIENT)
Dept: VASCULAR LAB | Facility: MEDICAL CENTER | Age: 37
End: 2022-08-02
Attending: INTERNAL MEDICINE
Payer: COMMERCIAL

## 2022-08-02 VITALS — SYSTOLIC BLOOD PRESSURE: 112 MMHG | HEART RATE: 89 BPM | DIASTOLIC BLOOD PRESSURE: 74 MMHG

## 2022-08-02 DIAGNOSIS — I82.432 ACUTE DEEP VEIN THROMBOSIS (DVT) OF POPLITEAL VEIN OF LEFT LOWER EXTREMITY (HCC): ICD-10-CM

## 2022-08-02 PROCEDURE — 99212 OFFICE O/P EST SF 10 MIN: CPT
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
## 2022-08-11 ENCOUNTER — OFFICE VISIT (OUTPATIENT)
Dept: PHYSICAL MEDICINE AND REHAB | Facility: REHABILITATION | Age: 37
End: 2022-08-11
Payer: COMMERCIAL

## 2022-08-11 VITALS
BODY MASS INDEX: 21.17 KG/M2 | RESPIRATION RATE: 16 BRPM | DIASTOLIC BLOOD PRESSURE: 80 MMHG | TEMPERATURE: 97.4 F | HEIGHT: 74 IN | OXYGEN SATURATION: 96 % | WEIGHT: 165 LBS | HEART RATE: 76 BPM | SYSTOLIC BLOOD PRESSURE: 116 MMHG

## 2022-08-11 DIAGNOSIS — Z86.718 HISTORY OF DVT (DEEP VEIN THROMBOSIS): ICD-10-CM

## 2022-08-11 DIAGNOSIS — T14.90XA TRAUMA: Primary | ICD-10-CM

## 2022-08-11 DIAGNOSIS — R39.12 WEAK URINARY STREAM: ICD-10-CM

## 2022-08-11 DIAGNOSIS — I10 HYPERTENSION, UNSPECIFIED TYPE: ICD-10-CM

## 2022-08-11 DIAGNOSIS — N52.9 ERECTILE DYSFUNCTION, UNSPECIFIED ERECTILE DYSFUNCTION TYPE: ICD-10-CM

## 2022-08-11 PROCEDURE — 99214 OFFICE O/P EST MOD 30 MIN: CPT | Performed by: PHYSICAL MEDICINE & REHABILITATION

## 2022-08-11 ASSESSMENT — FIBROSIS 4 INDEX: FIB4 SCORE: 0.46
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
## 2022-08-12 NOTE — TELEPHONE ENCOUNTER
Called Pt and relayed message from provider to continue to monitor their blood pressure and if the pt has a bp greater than 130/80 to continue lisinopril 5 mg daily. Pt expressed understanding of the providers message.

## 2022-10-12 ENCOUNTER — OFFICE VISIT (OUTPATIENT)
Dept: VASCULAR LAB | Facility: MEDICAL CENTER | Age: 37
End: 2022-10-12
Attending: INTERNAL MEDICINE
Payer: MEDICAID

## 2022-10-12 VITALS
HEIGHT: 74 IN | DIASTOLIC BLOOD PRESSURE: 77 MMHG | SYSTOLIC BLOOD PRESSURE: 121 MMHG | WEIGHT: 166.9 LBS | BODY MASS INDEX: 21.42 KG/M2 | HEART RATE: 72 BPM

## 2022-10-12 DIAGNOSIS — Z95.828 S/P IVC FILTER: ICD-10-CM

## 2022-10-12 DIAGNOSIS — D68.59 HYPERCOAGULABLE STATE (HCC): ICD-10-CM

## 2022-10-12 PROCEDURE — 99212 OFFICE O/P EST SF 10 MIN: CPT

## 2022-10-12 PROCEDURE — 99214 OFFICE O/P EST MOD 30 MIN: CPT | Performed by: NURSE PRACTITIONER

## 2022-10-12 ASSESSMENT — FIBROSIS 4 INDEX: FIB4 SCORE: 0.46

## 2022-10-12 ASSESSMENT — ENCOUNTER SYMPTOMS
HEADACHES: 0
DIZZINESS: 0
SHORTNESS OF BREATH: 0
PALPITATIONS: 0
BRUISES/BLEEDS EASILY: 0
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
## 2022-10-25 ENCOUNTER — APPOINTMENT (OUTPATIENT)
Dept: MEDICAL GROUP | Facility: PHYSICIAN GROUP | Age: 37
End: 2022-10-25
Payer: MEDICAID

## 2022-10-26 ENCOUNTER — PRE-ADMISSION TESTING (OUTPATIENT)
Dept: ADMISSIONS | Facility: MEDICAL CENTER | Age: 37
End: 2022-10-26
Attending: NURSE PRACTITIONER
Payer: MEDICAID

## 2022-11-22 ENCOUNTER — HOSPITAL ENCOUNTER (OUTPATIENT)
Facility: MEDICAL CENTER | Age: 37
End: 2022-11-22
Attending: STUDENT IN AN ORGANIZED HEALTH CARE EDUCATION/TRAINING PROGRAM | Admitting: STUDENT IN AN ORGANIZED HEALTH CARE EDUCATION/TRAINING PROGRAM
Payer: MEDICAID

## 2022-11-22 ENCOUNTER — APPOINTMENT (OUTPATIENT)
Dept: RADIOLOGY | Facility: MEDICAL CENTER | Age: 37
End: 2022-11-22
Attending: NURSE PRACTITIONER
Payer: MEDICAID

## 2022-11-22 VITALS
WEIGHT: 171.52 LBS | DIASTOLIC BLOOD PRESSURE: 88 MMHG | BODY MASS INDEX: 22.01 KG/M2 | RESPIRATION RATE: 16 BRPM | SYSTOLIC BLOOD PRESSURE: 135 MMHG | OXYGEN SATURATION: 94 % | TEMPERATURE: 97.7 F | HEART RATE: 78 BPM | HEIGHT: 74 IN

## 2022-11-22 DIAGNOSIS — Z95.828 S/P IVC FILTER: ICD-10-CM

## 2022-11-22 LAB
ERYTHROCYTE [DISTWIDTH] IN BLOOD BY AUTOMATED COUNT: 45 FL (ref 35.9–50)
HCT VFR BLD AUTO: 47.4 % (ref 42–52)
HGB BLD-MCNC: 16.1 G/DL (ref 14–18)
INR PPP: 0.96 (ref 0.87–1.13)
MCH RBC QN AUTO: 30.7 PG (ref 27–33)
MCHC RBC AUTO-ENTMCNC: 34 G/DL (ref 33.7–35.3)
MCV RBC AUTO: 90.3 FL (ref 81.4–97.8)
PLATELET # BLD AUTO: 233 K/UL (ref 164–446)
PMV BLD AUTO: 9.9 FL (ref 9–12.9)
PROTHROMBIN TIME: 12.7 SEC (ref 12–14.6)
RBC # BLD AUTO: 5.25 M/UL (ref 4.7–6.1)
WBC # BLD AUTO: 9.4 K/UL (ref 4.8–10.8)

## 2022-11-22 PROCEDURE — 85027 COMPLETE CBC AUTOMATED: CPT

## 2022-11-22 PROCEDURE — 700111 HCHG RX REV CODE 636 W/ 250 OVERRIDE (IP): Performed by: STUDENT IN AN ORGANIZED HEALTH CARE EDUCATION/TRAINING PROGRAM

## 2022-11-22 PROCEDURE — 160035 HCHG PACU - 1ST 60 MINS PHASE I

## 2022-11-22 PROCEDURE — 160046 HCHG PACU - 1ST 60 MINS PHASE II

## 2022-11-22 PROCEDURE — 85610 PROTHROMBIN TIME: CPT

## 2022-11-22 PROCEDURE — 160002 HCHG RECOVERY MINUTES (STAT)

## 2022-11-22 PROCEDURE — 700117 HCHG RX CONTRAST REV CODE 255: Performed by: NURSE PRACTITIONER

## 2022-11-22 PROCEDURE — 99153 MOD SED SAME PHYS/QHP EA: CPT

## 2022-11-22 PROCEDURE — 700111 HCHG RX REV CODE 636 W/ 250 OVERRIDE (IP)

## 2022-11-22 PROCEDURE — 36415 COLL VENOUS BLD VENIPUNCTURE: CPT

## 2022-11-22 PROCEDURE — 700105 HCHG RX REV CODE 258: Performed by: STUDENT IN AN ORGANIZED HEALTH CARE EDUCATION/TRAINING PROGRAM

## 2022-11-22 RX ORDER — MIDAZOLAM HYDROCHLORIDE 1 MG/ML
INJECTION INTRAMUSCULAR; INTRAVENOUS
Status: COMPLETED
Start: 2022-11-22 | End: 2022-11-22

## 2022-11-22 RX ORDER — ONDANSETRON 2 MG/ML
4 INJECTION INTRAMUSCULAR; INTRAVENOUS PRN
Status: DISCONTINUED | OUTPATIENT
Start: 2022-11-22 | End: 2022-11-22 | Stop reason: HOSPADM

## 2022-11-22 RX ORDER — IODIXANOL 270 MG/ML
20 INJECTION, SOLUTION INTRAVASCULAR ONCE
Status: COMPLETED | OUTPATIENT
Start: 2022-11-22 | End: 2022-11-22

## 2022-11-22 RX ORDER — MIDAZOLAM HYDROCHLORIDE 1 MG/ML
.5-2 INJECTION INTRAMUSCULAR; INTRAVENOUS PRN
Status: DISCONTINUED | OUTPATIENT
Start: 2022-11-22 | End: 2022-11-22 | Stop reason: HOSPADM

## 2022-11-22 RX ORDER — SODIUM CHLORIDE 9 MG/ML
500 INJECTION, SOLUTION INTRAVENOUS
Status: DISCONTINUED | OUTPATIENT
Start: 2022-11-22 | End: 2022-11-22 | Stop reason: HOSPADM

## 2022-11-22 RX ADMIN — MIDAZOLAM HYDROCHLORIDE 2 MG: 1 INJECTION INTRAMUSCULAR; INTRAVENOUS at 14:31

## 2022-11-22 RX ADMIN — FENTANYL CITRATE 50 MCG: 50 INJECTION INTRAMUSCULAR; INTRAVENOUS at 14:34

## 2022-11-22 RX ADMIN — FENTANYL CITRATE 50 MCG: 50 INJECTION, SOLUTION INTRAMUSCULAR; INTRAVENOUS at 14:34

## 2022-11-22 RX ADMIN — MIDAZOLAM HYDROCHLORIDE 2 MG: 1 INJECTION, SOLUTION INTRAMUSCULAR; INTRAVENOUS at 14:31

## 2022-11-22 RX ADMIN — IODIXANOL 20 ML: 270 INJECTION, SOLUTION INTRAVASCULAR at 15:30

## 2022-11-22 RX ADMIN — FENTANYL CITRATE 50 MCG: 50 INJECTION, SOLUTION INTRAMUSCULAR; INTRAVENOUS at 14:46

## 2022-11-22 ASSESSMENT — FIBROSIS 4 INDEX: FIB4 SCORE: 0.47

## 2022-11-22 ASSESSMENT — PAIN DESCRIPTION - PAIN TYPE: TYPE: ACUTE PAIN
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
## 2022-11-22 NOTE — OR SURGEON
Immediate Post- Operative Note        Findings: IVC Filter      Procedure(s): Filter retrieval      Estimated Blood Loss: Less than 5 ml        Complications: None            11/22/2022     3:05 PM     Slade Emery M.D.

## 2022-11-22 NOTE — DISCHARGE INSTRUCTIONS
HOME CARE INSTRUCTIONS    ACTIVITY: Rest and take it easy for the first 24 hours.  A responsible adult is recommended to remain with you during that time.  It is normal to feel sleepy.  We encourage you to not do anything that requires balance, judgment or coordination.    FOR 24 HOURS DO NOT:  Drive, operate machinery or run household appliances.  Drink beer or alcoholic beverages.  Make important decisions or sign legal documents.    SPECIAL INSTRUCTIONS: Inferior Vena Cava Filter Removal, Care After  This sheet gives you information about how to care for yourself after your procedure. Your health care provider may also give you more specific instructions. If you have problems or questions, contact your health care provider.  What can I expect after the procedure?  After the procedure, it is common to have:  Mild pain and bruising around your incision in your neck or groin.  Fatigue.  Follow these instructions at home:  Incision care    Follow instructions from your health care provider about how to take care of your incision. Make sure you:  Wash your hands with soap and water before you change your bandage (dressing). If soap and water are not available, use hand .  Change your dressing as told by your health care provider.  Check your incision area every day for signs of infection. Check for:  Redness, swelling, or more pain.  Fluid or blood.  Warmth.  Pus or a bad smell.  General instructions  Take over-the-counter and prescription medicines only as told by your health care provider.  Do not take baths, swim, or use a hot tub until your health care provider approves. Ask your health care provider if you may take showers. You may only be allowed to take sponge baths.  Do not drive for 24 hours if you were given a medicine to help you relax (sedative) during your procedure.  Return to your normal activities as told by your health care provider. Ask your health care provider what activities are safe for  you.  Keep all follow-up visits as told by your health care provider. This is important.  Contact a health care provider if:  You have chills or a fever.  You have redness, swelling, or more pain around your incision.  Your incision feels warm to the touch.  You have pus or a bad smell coming from your incision.  Get help right away if:  You have blood coming from your incision (active bleeding).  If you have bleeding from the incision site, lie down, apply pressure to the area with a clean cloth or gauze, and get help right away.  You have chest pain.  You have difficulty breathing.  Summary  Follow instructions from your health care provider about how to take care of your incision.  Return to your normal activities as told by your health care provider.  Check your incision area every day for signs of infection.  Get help right away if you have active bleeding, chest pain, or trouble breathing.  This information is not intended to replace advice given to you by your health care provider. Make sure you discuss any questions you have with your health care provider.  Document Released: 06/28/2018 Document Revised: 11/30/2018 Document Reviewed: 06/28/2018  [employer] Patient Education © 2020 [employer]      DIET: To avoid nausea, slowly advance diet as tolerated, avoiding spicy or greasy foods for the first day.  Add more substantial food to your diet according to your physician's instructions.  Babies can be fed formula or breast milk as soon as they are hungry.  INCREASE FLUIDS AND FIBER TO AVOID CONSTIPATION.      MEDICATIONS: Resume taking daily medication.  Take prescribed pain medication with food.  If no medication is prescribed, you may take non-aspirin pain medication if needed.  PAIN MEDICATION CAN BE VERY CONSTIPATING.  Take a stool softener or laxative such as senokot, pericolace, or milk of magnesia if needed.      A follow-up appointment should be arranged with your doctor in 1-2 weeks; call to  schedule.    You should CALL YOUR PHYSICIAN if you develop:  Fever greater than 101 degrees F.  Pain not relieved by medication, or persistent nausea or vomiting.  Excessive bleeding (blood soaking through dressing) or unexpected drainage from the wound.  Extreme redness or swelling around the incision site, drainage of pus or foul smelling drainage.  Inability to urinate or empty your bladder within 8 hours.  Problems with breathing or chest pain.    You should call 911 if you develop problems with breathing or chest pain.  If you are unable to contact your doctor or surgical center, you should go to the nearest emergency room or urgent care center.  Physician's telephone #: [phone] Dr. Higgins    MILD FLU-LIKE SYMPTOMS ARE NORMAL.  YOU MAY EXPERIENCE GENERALIZED MUSCLE ACHES, THROAT IRRITATION, HEADACHE AND/OR SOME NAUSEA.    If any questions arise, call your doctor.  If your doctor is not available, please feel free to call the Surgical Center at [phone].  The Center is open Monday through Friday from 7AM to 7PM.      A registered nurse may call you a few days after your surgery to see how you are doing after your procedure.    You may also receive a survey in the mail within the next two weeks and we ask that you take a few moments to complete the survey and return it to us.  Our goal is to provide you with very good care and we value your comments.     Depression / Suicide Risk    As you are discharged from this [facility] facility, it is important to learn how to keep safe from harming yourself.    Recognize the warning signs:  Abrupt changes in personality, positive or negative- including increase in energy   Giving away possessions  Change in eating patterns- significant weight changes-  positive or negative  Change in sleeping patterns- unable to sleep or sleeping all the time   Unwillingness or inability to communicate  Depression  Unusual sadness, discouragement and loneliness  Talk of wanting  to die  Neglect of personal appearance   Rebelliousness- reckless behavior  Withdrawal from people/activities they love  Confusion- inability to concentrate     If you or a loved one observes any of these behaviors or has concerns about self-harm, here's what you can do:  Talk about it- your feelings and reasons for harming yourself  Remove any means that you might use to hurt yourself (examples: pills, rope, extension cords, firearm)  Get professional help from the community (Mental Health, Substance Abuse, psychological counseling)  Do not be alone:Call your Safe Contact- someone whom you trust who will be there for you.  Call your local CRISIS HOTLINE 403-7459 or 187-229-3527  Call your local Children's Mobile Crisis Response Team [place] (891) 396-2447 or www.Badoo  Call the toll free National Suicide Prevention Hotlines   National Suicide Prevention Lifeline 597-646-TMWC (6029)  National [place] Line Network 800-SUICIDE (727-1371)    I acknowledge receipt and understanding of these Home Care instructions.

## 2022-11-23 ENCOUNTER — TELEPHONE (OUTPATIENT)
Dept: VASCULAR LAB | Facility: MEDICAL CENTER | Age: 37
End: 2022-11-23
Payer: MEDICAID

## 2022-11-23 NOTE — OR NURSING
Pt transferred to PACU. Per Eneida REDDY, ok to be off of bedrest and leave at 1630. Pt made aware of things to watch out for/ risks. Vitals taken, IV removed, WNL. Dressing CDI. DC education reviewed, questions encouraged and answered.

## 2023-06-07 ENCOUNTER — ANTICOAGULATION MONITORING (OUTPATIENT)
Dept: VASCULAR LAB | Facility: MEDICAL CENTER | Age: 38
End: 2023-06-07
Payer: MEDICAID

## 2023-06-07 DIAGNOSIS — I82.432 ACUTE DEEP VEIN THROMBOSIS (DVT) OF POPLITEAL VEIN OF LEFT LOWER EXTREMITY (HCC): ICD-10-CM

## (undated) DEVICE — LACTATED RINGERS INJ 1000 ML - (14EA/CA 60CA/PF)

## (undated) DEVICE — STAPLER SKIN DISP - (6/BX 10BX/CA) VISISTAT

## (undated) DEVICE — PROTECTOR ULNA NERVE - (36PR/CA)

## (undated) DEVICE — SLEEVE, VASO, THIGH, MED

## (undated) DEVICE — CHLORAPREP 26 ML APPLICATOR - ORANGE TINT(25/CA)

## (undated) DEVICE — WATER IRRIGATION STERILE 1000ML (12EA/CA)

## (undated) DEVICE — SODIUM CHL IRRIGATION 0.9% 1000ML (12EA/CA)

## (undated) DEVICE — DRAPE SURGICAL U 77X120 - (10/CA)

## (undated) DEVICE — BONE WAX (12PK/BX)

## (undated) DEVICE — CANISTER SUCTION 3000ML MECHANICAL FILTER AUTO SHUTOFF MEDI-VAC NONSTERILE LF DISP  (40EA/CA)

## (undated) DEVICE — WIRE GUIDE SYN 2.8 300MM TROC (2CSX10+COLLNRX5=25)

## (undated) DEVICE — HANDPIECE 10FT INTPLS SCT PLS IRRIGATION HAND CONTROL SET (6/PK)

## (undated) DEVICE — SUTURE 5 TI-CRON HOS-14 - (36/BX)

## (undated) DEVICE — Device

## (undated) DEVICE — GLOVE BIOGEL INDICATOR SZ 7.5 SURGICAL PF LTX - (50PR/BX 4BX/CA)

## (undated) DEVICE — DRAPE LARGE 3 QUARTER - (20/CA)

## (undated) DEVICE — SUTURE 0 VICRYL PLUS CT-1 - 36 INCH (36/BX)

## (undated) DEVICE — DRAPE 36X28IN RAD CARM BND BG - (25/CA) O

## (undated) DEVICE — SUTURE GENERAL

## (undated) DEVICE — GLOVE BIOGEL PI ORTHO SZ 7.5 PF LF (40PR/BX)

## (undated) DEVICE — HEAD HOLDER JUNIOR/ADULT

## (undated) DEVICE — DRAPE U ORTHOPEDIC - (10/BX)

## (undated) DEVICE — TUBE CONNECT SUCTION CLEAR 120 X 1/4" (50EA/CA)"

## (undated) DEVICE — ELECTRODE 850 FOAM ADHESIVE - HYDROGEL RADIOTRNSPRNT (50/PK)

## (undated) DEVICE — PAD LAP STERILE 18 X 18 - (5/PK 40PK/CA)

## (undated) DEVICE — TUBING CLEARLINK DUO-VENT - C-FLO (48EA/CA)

## (undated) DEVICE — NEPTUNE 4 PORT MANIFOLD - (20/PK)

## (undated) DEVICE — MASK ANESTHESIA ADULT  - (100/CA)

## (undated) DEVICE — SET LEADWIRE 5 LEAD BEDSIDE DISPOSABLE ECG (1SET OF 5/EA)

## (undated) DEVICE — DRAIN PENROSE 1 IN X 18 IN - STERILE (25EA/BX)

## (undated) DEVICE — SUCTION INSTRUMENT YANKAUER OPEN TIP W/O VENT (50EA/CA)

## (undated) DEVICE — TOWEL STOP TIMEOUT SAFETY FLAG (40EA/CA)

## (undated) DEVICE — DRESSING ABDOMINAL PAD STERILE 8 X 10" (360EA/CA)"

## (undated) DEVICE — GOWN WARMING STANDARD FLEX - (30/CA)

## (undated) DEVICE — SET EXTENSION WITH 2 PORTS (48EA/CA) ***PART #2C8610 IS A SUBSTITUTE*****

## (undated) DEVICE — ASNIS III CANNULATED 6.5MM TWIST DRILL

## (undated) DEVICE — SUTURE 2-0 VICRYL PLUS CT-1 36 (36PK/BX)"

## (undated) DEVICE — DRESSING PETROLEUM GAUZE 5 X 9" (50EA/BX 4BX/CA)"

## (undated) DEVICE — KIT ANESTHESIA W/CIRCUIT & 3/LT BAG W/FILTER (20EA/CA)

## (undated) DEVICE — TIP INTPLS HFLO ML ORFC BTRY - (12/CS)  FOR SURGILAV

## (undated) DEVICE — WIRE GUIDE SYN 2.8 300 CALIB. (2CSX10=20)

## (undated) DEVICE — PACK MAJOR ORTHO - (2EA/CA)

## (undated) DEVICE — SUCTION INSTRUMENT YANKAUER BULBOUS TIP W/O VENT (50EA/CA)

## (undated) DEVICE — DRAPE SURG STERI-DRAPE 7X11OD - (40EA/CA)

## (undated) DEVICE — DRAPESURG STERI-DRAPE LONG - (10/BX 4BX/CA)